# Patient Record
Sex: FEMALE | Race: WHITE | NOT HISPANIC OR LATINO | Employment: OTHER | URBAN - METROPOLITAN AREA
[De-identification: names, ages, dates, MRNs, and addresses within clinical notes are randomized per-mention and may not be internally consistent; named-entity substitution may affect disease eponyms.]

---

## 2017-01-13 ENCOUNTER — TELEPHONE (OUTPATIENT)
Dept: FAMILY MEDICINE CLINIC | Facility: CLINIC | Age: 62
End: 2017-01-13

## 2017-01-13 ENCOUNTER — OFFICE VISIT (OUTPATIENT)
Dept: FAMILY MEDICINE CLINIC | Facility: CLINIC | Age: 62
End: 2017-01-13

## 2017-01-13 VITALS
BODY MASS INDEX: 44.65 KG/M2 | DIASTOLIC BLOOD PRESSURE: 90 MMHG | WEIGHT: 227.4 LBS | RESPIRATION RATE: 16 BRPM | TEMPERATURE: 98.5 F | HEART RATE: 76 BPM | SYSTOLIC BLOOD PRESSURE: 118 MMHG | HEIGHT: 60 IN

## 2017-01-13 DIAGNOSIS — M25.562 CHRONIC PAIN OF LEFT KNEE: ICD-10-CM

## 2017-01-13 DIAGNOSIS — G89.29 CHRONIC PAIN OF LEFT KNEE: ICD-10-CM

## 2017-01-13 DIAGNOSIS — R05.9 COUGH: ICD-10-CM

## 2017-01-13 DIAGNOSIS — G47.00 INSOMNIA, UNSPECIFIED TYPE: ICD-10-CM

## 2017-01-13 DIAGNOSIS — J10.1 INFLUENZA A: Primary | ICD-10-CM

## 2017-01-13 DIAGNOSIS — R50.9 FEVER, UNSPECIFIED FEVER CAUSE: ICD-10-CM

## 2017-01-13 LAB
EXPIRATION DATE: ABNORMAL
FLUAV AG NPH QL: POSITIVE
FLUBV AG NPH QL: NEGATIVE
INTERNAL CONTROL: ABNORMAL
Lab: ABNORMAL

## 2017-01-13 PROCEDURE — 87804 INFLUENZA ASSAY W/OPTIC: CPT | Performed by: FAMILY MEDICINE

## 2017-01-13 PROCEDURE — 99213 OFFICE O/P EST LOW 20 MIN: CPT | Performed by: FAMILY MEDICINE

## 2017-01-13 RX ORDER — OSELTAMIVIR PHOSPHATE 75 MG/1
75 CAPSULE ORAL 2 TIMES DAILY
Qty: 10 CAPSULE | Refills: 0 | Status: SHIPPED | OUTPATIENT
Start: 2017-01-13 | End: 2017-01-30

## 2017-01-13 RX ORDER — ESZOPICLONE 1 MG/1
1 TABLET, FILM COATED ORAL NIGHTLY
Qty: 30 TABLET | Refills: 2 | Status: SHIPPED | OUTPATIENT
Start: 2017-01-13 | End: 2017-01-30

## 2017-01-13 RX ORDER — ESZOPICLONE 1 MG/1
1 TABLET, FILM COATED ORAL NIGHTLY
Qty: 30 TABLET | Refills: 0 | Status: SHIPPED | OUTPATIENT
Start: 2017-01-13 | End: 2017-01-13 | Stop reason: SDUPTHER

## 2017-01-13 RX ORDER — OSELTAMIVIR PHOSPHATE 75 MG/1
75 CAPSULE ORAL 2 TIMES DAILY
Qty: 10 CAPSULE | Refills: 0 | Status: SHIPPED | OUTPATIENT
Start: 2017-01-13 | End: 2017-01-13 | Stop reason: SDUPTHER

## 2017-01-13 NOTE — PATIENT INSTRUCTIONS
Go to the nearest ER or return to clinic if symptoms worsen, fever/chill develop      Influenza, Adult  Influenza (flu) is an infection in the mouth, nose, and throat (respiratory tract) caused by a virus. The flu can make you feel very ill. Influenza spreads easily from person to person (contagious).   HOME CARE   · Only take medicines as told by your doctor.  · Use a cool mist humidifier to make breathing easier.  · Get plenty of rest until your fever goes away. This usually takes 3 to 4 days.  · Drink enough fluids to keep your pee (urine) clear or pale yellow.  · Cover your mouth and nose when you cough or sneeze.  · Wash your hands well to avoid spreading the flu.  · Stay home from work or school until your fever has been gone for at least 1 full day.  · Get a flu shot every year.  GET HELP RIGHT AWAY IF:   · You have trouble breathing or feel short of breath.  · Your skin or nails turn blue.  · You have severe neck pain or stiffness.  · You have a severe headache, facial pain, or earache.  · Your fever gets worse or keeps coming back.  · You feel sick to your stomach (nauseous), throw up (vomit), or have watery poop (diarrhea).  · You have chest pain.  · You have a deep cough that gets worse, or you cough up more thick spit (mucus).  MAKE SURE YOU:   · Understand these instructions.  · Will watch your condition.  · Will get help right away if you are not doing well or get worse.     This information is not intended to replace advice given to you by your health care provider. Make sure you discuss any questions you have with your health care provider.     Document Released: 09/26/2009 Document Revised: 01/08/2016 Document Reviewed: 03/18/2013  [employer]om[employer] Interactive Patient Education ©2016 [employer]

## 2017-01-13 NOTE — MR AVS SNAPSHOT
Connie Lu   1/13/2017 9:15 AM   Office Visit    Dept Phone:  [phone]   Encounter #:  [id number]    Provider:  Ambreen Wright DO   Department:  [facility]                Your Full Care Plan              Today's Medication Changes          These changes are accurate as of: 1/13/17 10:09 AM.  If you have any questions, ask your nurse or doctor.               New Medication(s)Ordered:     eszopiclone 1 MG tablet   Commonly known as:  LUNESTA   Take 1 tablet by mouth Every Night. Take immediately before bedtime   Started by:  Ambreen Wright DO       HYDROcodone-homatropine 5-1.5 MG/5ML syrup   Commonly known as:  HYCODAN   Take 5 mL by mouth Every 6 (Six) Hours As Needed for cough.   Started by:  Ambreen Wright DO       oseltamivir 75 MG capsule   Commonly known as:  TAMIFLU   Take 1 capsule by mouth 2 (Two) Times a Day.   Started by:  Ambreen Wright DO         Stop taking medication(s)listed here:     temazepam 30 MG capsule   Commonly known as:  RESTORIL   Stopped by:  Ambreen Wright DO                Where to Get Your Medications      These medications were sent to [facility]/pharmacy #[address] [place] [address] - [address] [place] [address] AT [address] [phone] [facility] [phone] [place] [address]    Hours:  24-hours Phone:  [phone]     oseltamivir 75 MG capsule         You can get these medications from any pharmacy     Bring a paper prescription for each of these medications     eszopiclone 1 MG tablet    HYDROcodone-homatropine 5-1.5 MG/5ML syrup                  Your Updated Medication List          This list is accurate as of: 1/13/17 10:09 AM.  Always use your most recent med list.                * DULoxetine 30 MG capsule   Commonly known as:  CYMBALTA   TAKE 1 CAP QD WITH 60MG CAP QD FOR A TOTAL OF 90 MG QD       * DULoxetine 60 MG capsule   Commonly known as:  CYMBALTA   Take 1  capsule by mouth Daily.       eszopiclone 1 MG tablet   Commonly known as:  LUNESTA   Take 1 tablet by mouth Every Night. Take immediately before bedtime       HYDROcodone-homatropine 5-1.5 MG/5ML syrup   Commonly known as:  HYCODAN   Take 5 mL by mouth Every 6 (Six) Hours As Needed for cough.       levothyroxine 137 MCG tablet   Commonly known as:  SYNTHROID, LEVOTHROID   TAKE 1 TABLET EVERY DAY       losartan 100 MG tablet   Commonly known as:  COZAAR   TAKE 1 TABLET EVERY DAY       oseltamivir 75 MG capsule   Commonly known as:  TAMIFLU   Take 1 capsule by mouth 2 (Two) Times a Day.       triamterene-hydrochlorothiazide 37.5-25 MG per capsule   Commonly known as:  DYAZIDE   TAKE 1 CAPSULE EVERY DAY       * Notice:  This list has 2 medication(s) that are the same as other medications prescribed for you. Read the directions carefully, and ask your doctor or other care provider to review them with you.            We Performed the Following     Ambulatory Referral to Orthopedic Surgery     POC Influenza A / B       You Were Diagnosed With        Codes Comments    Cough    -  Primary ICD-10-CM: R05  ICD-9-CM: 786.2     Fever, unspecified fever cause     ICD-10-CM: R50.9  ICD-9-CM: 780.60     Chronic pain of left knee     ICD-10-CM: M25.562, G89.29  ICD-9-CM: 719.46, 338.29     Influenza A     ICD-10-CM: J10.1  ICD-9-CM: 487.1     Insomnia, unspecified type     ICD-10-CM: G47.00  ICD-9-CM: 780.52       Instructions    Go to the nearest ER or return to clinic if symptoms worsen, fever/chill develop      Influenza, Adult  Influenza (flu) is an infection in the mouth, nose, and throat (respiratory tract) caused by a virus. The flu can make you feel very ill. Influenza spreads easily from person to person (contagious).   HOME CARE   · Only take medicines as told by your doctor.  · Use a cool mist humidifier to make breathing easier.  · Get plenty of rest until your fever goes away. This usually takes 3 to 4 days.  · Drink  enough fluids to keep your pee (urine) clear or pale yellow.  · Cover your mouth and nose when you cough or sneeze.  · Wash your hands well to avoid spreading the flu.  · Stay home from work or school until your fever has been gone for at least 1 full day.  · Get a flu shot every year.  GET HELP RIGHT AWAY IF:   · You have trouble breathing or feel short of breath.  · Your skin or nails turn blue.  · You have severe neck pain or stiffness.  · You have a severe headache, facial pain, or earache.  · Your fever gets worse or keeps coming back.  · You feel sick to your stomach (nauseous), throw up (vomit), or have watery poop (diarrhea).  · You have chest pain.  · You have a deep cough that gets worse, or you cough up more thick spit (mucus).  MAKE SURE YOU:   · Understand these instructions.  · Will watch your condition.  · Will get help right away if you are not doing well or get worse.     This information is not intended to replace advice given to you by your health care provider. Make sure you discuss any questions you have with your health care provider.     Document Released: 2009 Document Revised: 2016 Document Reviewed: 2013  [employer] Interactive Patient Education ©6 [employer]       Patient Instructions History      Upcoming Appointments     Visit Type Date Time Department    OFFICE VISIT 2017  9:15 AM [facility]    FOLLOW UP 3/3/2017  4:00 PM [facility] Signup     [facility] [employer] allows you to send messages to your doctor, view your test results, renew your prescriptions, schedule appointments, and more. To sign up, go to [employer] and click on the Sign Up Now link in the New User? box. Enter your ProtoExchange Activation Code exactly as it appears below along with the last four digits of your Social Security Number and your Date of Birth () to complete the sign-up process. If you do not sign up before the expiration date, you  "must request a new code.    MyChart Activation Code: [email or web address]  Expires: 1/27/2017 10:09 AM    If you have questions, you can email [email or web address] or call [phone] to talk to our MyChart staff. Remember, MyChart is NOT to be used for urgent needs. For medical emergencies, dial 911.               Other Info from Your Visit           Your Appointments     Mar 03, 2017  4:00 PM EST   Follow Up with Ambreen Wright,    [facility]ICINE (--)    [address] [place]. [facility] [address] [phone]           Arrive 15 minutes prior to appointment. Arrive 15 minutes before your appointment to complete Registration. Please bring all medications, insurance card and copay.              Allergies     Cephalexin        Reason for Visit     chest congestion & cough, fever started two days ago    Knee pain / L side needs referral back to Dr. Fernández's office    FU insomnia Temazepam not working      Vital Signs     Blood Pressure Pulse Temperature Respirations Height Weight    118/90 76 98.5 °F (36.9 °C) 16 60\" (152.4 cm) 227 lb 6.4 oz (103 kg)    Body Mass Index Smoking Status                44.41 kg/m2 Never Smoker          Problems and Diagnoses Noted     Knee pain    Cough    -  Primary    Fever        Influenza A        Difficulty falling or staying asleep          Results     POC Influenza A / B      Component Value Standard Range & Units    Rapid Influenza A Ag positive     Rapid Influenza B Ag negative     Internal Control Passed Passed    Lot Number [id number]     Expiration Date 12/2017                     "

## 2017-01-13 NOTE — PROGRESS NOTES
"Subjective   Connie Lu is a 61 y.o. female.     History of Present Illness   Here for evaluation of fever and cough  Symptoms started Wednesday night  She has been coughing, very little sputum production  Pleuritic chest pain worse with coughing  Fever yesterday was 102  +headache  Denies body aches, nausea, vomiting    Chronic left knee pain  She has been seeing a chiropractor for treatment. Recently did xrays on her back, was told that she has arthritic changes  She has been told that her knee is \"bone on bone\"  Was supposed to get injections in her knee, but insurance wouldn't approve them  She is wanting to be referred back to Dr. Fernández for evaluation and possible injection therapy     Insomnia:  She has previously used Ambien for 3 years, it eventually lost its effects  Currently taking Restoril 30mg nightly. Having difficulty falling asleep, up and down all night. Not getting more than 4 hours total sleep at night.  Restoril never did improve ability to fall asleep or quality of sleep.  She has tried OTC melatonin and Unisom without any improvement of sleep.    The following portions of the patient's history were reviewed and updated as appropriate: allergies, current medications, past family history, past medical history, past social history, past surgical history and problem list.    Review of Systems   Constitutional: Positive for chills, fatigue and fever.   HENT: Positive for congestion and ear pain. Negative for postnasal drip, rhinorrhea, sinus pressure and sore throat.    Respiratory: Positive for cough and chest tightness.    Cardiovascular: Negative for chest pain.   Gastrointestinal: Positive for abdominal pain. Negative for diarrhea, nausea and vomiting.   Musculoskeletal: Positive for arthralgias (knees). Negative for myalgias.   Neurological: Positive for headaches. Negative for dizziness.   Psychiatric/Behavioral: Positive for sleep disturbance. Negative for agitation, self-injury and " suicidal ideas.       Objective   Physical Exam   Constitutional: She is oriented to person, place, and time. She appears well-developed and well-nourished.   HENT:   Head: Normocephalic and atraumatic.   Right Ear: Hearing, tympanic membrane and external ear normal. No middle ear effusion.   Left Ear: Hearing, tympanic membrane and external ear normal.  No middle ear effusion.   Nose: Nose normal.   Ear canals erythematous bilaterally   Eyes: Conjunctivae and EOM are normal.   Neck: Normal range of motion. Neck supple.   Cardiovascular: Normal rate, regular rhythm and normal heart sounds.    No murmur heard.  Pulmonary/Chest: Effort normal and breath sounds normal. She has no wheezes.   Coughs throughout the exam   Abdominal: Soft. Bowel sounds are normal.   Musculoskeletal: She exhibits no edema.   Lymphadenopathy:     She has no cervical adenopathy.   Neurological: She is alert and oriented to person, place, and time. No cranial nerve deficit.   Skin: Skin is warm and dry. No rash noted.   Psychiatric: She has a normal mood and affect. Her behavior is normal. Thought content normal.   Nursing note and vitals reviewed.      Assessment/Plan   Connie was seen today for chest congestion & cough, fever, knee pain / l side and fu insomnia.    Diagnoses and all orders for this visit:    Influenza A  -     Discontinue: oseltamivir (TAMIFLU) 75 MG capsule; Take 1 capsule by mouth 2 (Two) Times a Day.  -     Discontinue: HYDROcodone-homatropine (HYCODAN) 5-1.5 MG/5ML syrup; Take 5 mL by mouth Every 6 (Six) Hours As Needed for cough.  -     oseltamivir (TAMIFLU) 75 MG capsule; Take 1 capsule by mouth 2 (Two) Times a Day.  -     HYDROcodone-homatropine (HYCODAN) 5-1.5 MG/5ML syrup; Take 5 mL by mouth Every 6 (Six) Hours As Needed for cough.    Cough  -     POC Influenza A / B  -     Discontinue: HYDROcodone-homatropine (HYCODAN) 5-1.5 MG/5ML syrup; Take 5 mL by mouth Every 6 (Six) Hours As Needed for cough.  -      HYDROcodone-homatropine (HYCODAN) 5-1.5 MG/5ML syrup; Take 5 mL by mouth Every 6 (Six) Hours As Needed for cough.    Fever, unspecified fever cause  -     POC Influenza A / B    Chronic pain of left knee  -     Ambulatory Referral to Orthopedic Surgery    Insomnia, unspecified type  -     Discontinue: eszopiclone (LUNESTA) 1 MG tablet; Take 1 tablet by mouth Every Night. Take immediately before bedtime  -     eszopiclone (LUNESTA) 1 MG tablet; Take 1 tablet by mouth Every Night. Take immediately before bedtime    She did test positive for influenza a, will prescribe Tamiflu for treatment.  Recommended her to alternate Tylenol and Motrin as needed for fever and body aches.   If she cannot afford the Tamiflu prescription, she can take over-the-counter Tylenol Cold and flu with TheraFlu as directed for treatment.  Cough syrup prescribed to be used when necessary  Go to the nearest ER or return to clinic if symptoms worsen, fever/chill develop  Discontinue Restoril since it was not improving her insomnia. She has failed Ambien, also.  I will attempt treatment with Lunesta.  Advised her to follow good sleep hygiene including no TVs, telephone, or electronics on while trying to fall asleep.   Referred back to orthopedic surgery per her request    Ambreen Wright DO

## 2017-01-16 ENCOUNTER — TELEPHONE (OUTPATIENT)
Dept: FAMILY MEDICINE CLINIC | Facility: CLINIC | Age: 62
End: 2017-01-16

## 2017-01-16 NOTE — TELEPHONE ENCOUNTER
----- Message from Kiera Munoz sent at 1/16/2017  8:35 AM EST -----  Contact: Adriana  Patient stated she hasn't had a fever since yesterday and wanted to see if it was okay for her to go back to work tomorrow. Please call at [phone].

## 2017-01-30 ENCOUNTER — OFFICE VISIT (OUTPATIENT)
Dept: FAMILY MEDICINE CLINIC | Facility: CLINIC | Age: 62
End: 2017-01-30

## 2017-01-30 VITALS
HEART RATE: 84 BPM | RESPIRATION RATE: 16 BRPM | DIASTOLIC BLOOD PRESSURE: 90 MMHG | TEMPERATURE: 97.2 F | BODY MASS INDEX: 44.37 KG/M2 | HEIGHT: 60 IN | OXYGEN SATURATION: 99 % | WEIGHT: 226 LBS | SYSTOLIC BLOOD PRESSURE: 120 MMHG

## 2017-01-30 DIAGNOSIS — F34.1 DYSTHYMIA: ICD-10-CM

## 2017-01-30 DIAGNOSIS — R05.9 COUGH: Primary | ICD-10-CM

## 2017-01-30 DIAGNOSIS — E03.9 ACQUIRED HYPOTHYROIDISM: ICD-10-CM

## 2017-01-30 DIAGNOSIS — I10 ESSENTIAL HYPERTENSION: ICD-10-CM

## 2017-01-30 DIAGNOSIS — F41.9 ANXIETY: ICD-10-CM

## 2017-01-30 DIAGNOSIS — F51.01 PRIMARY INSOMNIA: ICD-10-CM

## 2017-01-30 PROCEDURE — 99214 OFFICE O/P EST MOD 30 MIN: CPT | Performed by: FAMILY MEDICINE

## 2017-01-30 RX ORDER — TRIAMTERENE AND HYDROCHLOROTHIAZIDE 37.5; 25 MG/1; MG/1
1 CAPSULE ORAL DAILY
Qty: 30 CAPSULE | Refills: 2 | Status: SHIPPED | OUTPATIENT
Start: 2017-01-30 | End: 2017-06-05 | Stop reason: SDUPTHER

## 2017-01-30 RX ORDER — TRAZODONE HYDROCHLORIDE 50 MG/1
50-100 TABLET ORAL NIGHTLY
Qty: 30 TABLET | Refills: 2 | Status: SHIPPED | OUTPATIENT
Start: 2017-01-30 | End: 2017-04-12 | Stop reason: SDUPTHER

## 2017-01-30 RX ORDER — LEVOTHYROXINE SODIUM 137 UG/1
137 TABLET ORAL DAILY
Qty: 30 TABLET | Refills: 2 | Status: SHIPPED | OUTPATIENT
Start: 2017-01-30 | End: 2017-03-12 | Stop reason: SDUPTHER

## 2017-01-30 RX ORDER — LOSARTAN POTASSIUM 100 MG/1
100 TABLET ORAL DAILY
Qty: 30 TABLET | Refills: 2 | Status: SHIPPED | OUTPATIENT
Start: 2017-01-30 | End: 2017-05-10 | Stop reason: SDUPTHER

## 2017-01-30 RX ORDER — DULOXETIN HYDROCHLORIDE 30 MG/1
CAPSULE, DELAYED RELEASE ORAL
Qty: 30 CAPSULE | Refills: 2 | Status: SHIPPED | OUTPATIENT
Start: 2017-01-30 | End: 2017-05-10 | Stop reason: SDUPTHER

## 2017-01-30 RX ORDER — DULOXETIN HYDROCHLORIDE 60 MG/1
60 CAPSULE, DELAYED RELEASE ORAL DAILY
Qty: 30 CAPSULE | Refills: 2 | Status: SHIPPED | OUTPATIENT
Start: 2017-01-30 | End: 2017-06-05 | Stop reason: SDUPTHER

## 2017-01-30 NOTE — MR AVS SNAPSHOT
Connie Lu   1/30/2017 4:00 PM   Office Visit    Dept Phone:  [phone]   Encounter #:  [id number]    Provider:  Masoud Martin MD   Department:  [facility]                Your Full Care Plan              Today's Medication Changes          These changes are accurate as of: 1/30/17  4:33 PM.  If you have any questions, ask your nurse or doctor.               New Medication(s)Ordered:     traZODone 50 MG tablet   Commonly known as:  DESYREL   Take 1-2 tablets by mouth Every Night.   Started by:  Masoud Martin MD         Medication(s)that have changed:     levothyroxine 137 MCG tablet   Commonly known as:  SYNTHROID, LEVOTHROID   Take 1 tablet by mouth Daily.   What changed:  See the new instructions.   Changed by:  Masoud Martin MD       losartan 100 MG tablet   Commonly known as:  COZAAR   Take 1 tablet by mouth Daily.   What changed:  See the new instructions.   Changed by:  Masoud Martin MD       triamterene-hydrochlorothiazide 37.5-25 MG per capsule   Commonly known as:  DYAZIDE   Take 1 capsule by mouth Daily.   What changed:  See the new instructions.   Changed by:  Masoud Martin MD         Stop taking medication(s)listed here:     eszopiclone 1 MG tablet   Commonly known as:  LUNESTA   Stopped by:  Masoud Martin MD           oseltamivir 75 MG capsule   Commonly known as:  TAMIFLU   Stopped by:  Masoud Martin MD                Where to Get Your Medications      These medications were sent to [facility]/pharmacy [address] [place] [address] - [address] [place] [address] AT [address] [phone] [facility] [phone] [place] [address]    Hours:  24-hours Phone:  [phone]     DULoxetine 30 MG capsule    DULoxetine 60 MG capsule    levothyroxine 137 MCG tablet    losartan 100 MG tablet    traZODone 50 MG tablet    triamterene-hydrochlorothiazide 37.5-25 MG per capsule         You can get these medications from any  pharmacy     Bring a paper prescription for each of these medications     HYDROcodone-homatropine 5-1.5 MG/5ML syrup                  Your Updated Medication List          This list is accurate as of: 1/30/17  4:33 PM.  Always use your most recent med list.                * DULoxetine 30 MG capsule   Commonly known as:  CYMBALTA   TAKE 1 CAP QD WITH 60MG CAP QD FOR A TOTAL OF 90 MG QD       * DULoxetine 60 MG capsule   Commonly known as:  CYMBALTA   Take 1 capsule by mouth Daily.       HYDROcodone-homatropine 5-1.5 MG/5ML syrup   Commonly known as:  HYCODAN   Take 5 mL by mouth Every 6 (Six) Hours As Needed for cough.       levothyroxine 137 MCG tablet   Commonly known as:  SYNTHROID, LEVOTHROID   Take 1 tablet by mouth Daily.       losartan 100 MG tablet   Commonly known as:  COZAAR   Take 1 tablet by mouth Daily.       traZODone 50 MG tablet   Commonly known as:  DESYREL   Take 1-2 tablets by mouth Every Night.       triamterene-hydrochlorothiazide 37.5-25 MG per capsule   Commonly known as:  DYAZIDE   Take 1 capsule by mouth Daily.       * Notice:  This list has 2 medication(s) that are the same as other medications prescribed for you. Read the directions carefully, and ask your doctor or other care provider to review them with you.            You Were Diagnosed With        Codes Comments    Cough    -  Primary ICD-10-CM: R05  ICD-9-CM: 786.2     Primary insomnia     ICD-10-CM: F51.01  ICD-9-CM: 307.42     Dysthymia     ICD-10-CM: F34.1  ICD-9-CM: 300.4     Anxiety     ICD-10-CM: F41.9  ICD-9-CM: 300.00     Essential hypertension     ICD-10-CM: I10  ICD-9-CM: 401.9     Acquired hypothyroidism     ICD-10-CM: E03.9  ICD-9-CM: 244.9       Instructions     None    Patient Instructions History      Upcoming Appointments     Visit Type Date Time Department    OFFICE VISIT 1/30/2017  4:00 PM E [facility]    FOLLOW UP 3/3/2017  4:00 PM E [facility] Signup     [facility] allows you  "to send messages to your doctor, view your test results, renew your prescriptions, schedule appointments, and more. To sign up, go to [employer] and click on the Sign Up Now link in the New User? box. Enter your Streamfile Activation Code exactly as it appears below along with the last four digits of your Social Security Number and your Date of Birth () to complete the sign-up process. If you do not sign up before the expiration date, you must request a new code.    Streamfile Activation Code: [email or web address]  Expires: 2017  4:33 PM    If you have questions, you can email [email or web address] or call [phone] to talk to our Stream[employer] staff. Remember, Stream[employer] is NOT to be used for urgent needs. For medical emergencies, dial 911.               Other Info from Your Visit           Your Appointments     Mar 03, 2017  4:00 PM EST   Follow Up with Ambreen Wright DO   [facility]ICINE (--)    [address] [facility] [address] [phone]           Arrive 15 minutes prior to appointment. Arrive 15 minutes before your appointment to complete Registration. Please bring all medications, insurance card and copay.              Allergies     Cephalexin        Reason for Visit     Cough pt is hoarse and isn't able to sleep, rx Dr Wright prescribed was 300.00 and pt wasn't able to get filled    Shortness of Breath beleives her asthma is acting up    Insomnia           Vital Signs     Blood Pressure Pulse Temperature Respirations Height Weight    120/90 84 97.2 °F (36.2 °C) (Temporal Artery ) 16 60\" (152.4 cm) 226 lb (103 kg)    Oxygen Saturation Body Mass Index Smoking Status             99% 44.14 kg/m2 Never Smoker         Problems and Diagnoses Noted     Anxiety problem    Depression    High blood pressure    Underactive thyroid    Difficulty falling or staying asleep    Cough    -  Primary        "

## 2017-01-30 NOTE — PROGRESS NOTES
Chief Complaint   Patient presents with   • Cough     pt is hoarse and isn't able to sleep, rx Dr Wright prescribed was 300.00 and pt wasn't able to get filled   • Shortness of Breath     beleives her asthma is acting up   • Insomnia       Subjective     Cough   This is a new problem. The current episode started 1 to 4 weeks ago ([place] 1/12/2017, dx + Flu A, was too expensive and did not take). The problem occurs hourly. The cough is productive of sputum (clear). Associated symptoms include shortness of breath (? asthma acting up, Dr Guerin previous dx. Has inhaler and uses as needed. Used inhaler and no help) and wheezing. Pertinent negatives include no chest pain, fever (no fever x 1 week) or nasal congestion. She has tried a beta-agonist inhaler (hycodan helped while taking and slept better) for the symptoms. The treatment provided no relief.       Insomnia  Chronic sleep issues  Tried Temazepam 15 mg and 30 and no help  Ambien in the past and tried again and no help. Ambien helped in the past.     Hard to work full 8 hour days and has increased neck and back pain. Goes to chiropractor and helps. Has arthritis in the neck and back. Considering apply to disability and the appeal. Has decreased to 20 hrs per work per week.     working on losing weight, friend is helping her.     Depression and anxiety  Tried Cymbalta 60 am and 30 mg HS . When taking both, increased agitation.   Not depression on meds.   No suicidal thoughts    Hypothyroidism  Needs refill of thyroid medicine.  Last levels have been good.  No new symptoms.  No significant fatigue except for lack of sleep    Hypertension.  Needs refill of medication.  No chest pain.  Has had shortness of breath related cough as noted.  No swelling.  Medications have been helping with blood pressure  Past Medical History,Medications, Allergies, and social history was reviewed.    Review of Systems   Constitutional: Positive for fatigue. Negative for fever (no fever x  1 week).   HENT: Positive for congestion.    Respiratory: Positive for cough, shortness of breath (? asthma acting up, Dr Guerin previous dx. Has inhaler and uses as needed. Used inhaler and no help) and wheezing.    Cardiovascular: Negative for chest pain.   Gastrointestinal: Negative.    Genitourinary: Negative.    Neurological: Negative.    Psychiatric/Behavioral: Positive for sleep disturbance. The patient is nervous/anxious (some anciety. On Cymbalta and helps).        Objective     Physical Exam   Constitutional: She is oriented to person, place, and time. She appears well-developed and well-nourished.   obese   HENT:   Head: Normocephalic and atraumatic.   Right Ear: Hearing, tympanic membrane, external ear and ear canal normal.   Left Ear: Hearing, tympanic membrane, external ear and ear canal normal.   Mouth/Throat: Oropharynx is clear and moist.   Eyes: Conjunctivae and EOM are normal. Pupils are equal, round, and reactive to light.   Neck: Normal range of motion. Neck supple. No thyromegaly present.   Cardiovascular: Normal rate, regular rhythm and normal heart sounds.  Exam reveals no gallop and no friction rub.    No murmur heard.  Pulmonary/Chest: Effort normal and breath sounds normal. No respiratory distress. She has no wheezes. She has no rales.   Abdominal: Soft. Bowel sounds are normal.   Musculoskeletal: She exhibits no edema.   Neurological: She is alert and oriented to person, place, and time.   Skin: Skin is warm and dry.   Psychiatric: She has a normal mood and affect. Her behavior is normal.   Nursing note and vitals reviewed.        Assessment/Plan     Problem List Items Addressed This Visit        Cardiovascular and Mediastinum    Hypertension    Relevant Medications    losartan (COZAAR) 100 MG tablet    triamterene-hydrochlorothiazide (DYAZIDE) 37.5-25 MG per capsule       Endocrine    Hypothyroidism    Relevant Medications    levothyroxine (SYNTHROID, LEVOTHROID) 137 MCG tablet        Other    Depression    Relevant Medications    traZODone (DESYREL) 50 MG tablet    DULoxetine (CYMBALTA) 30 MG capsule    DULoxetine (CYMBALTA) 60 MG capsule    Primary insomnia    Relevant Medications    traZODone (DESYREL) 50 MG tablet    Anxiety    Relevant Medications    DULoxetine (CYMBALTA) 30 MG capsule    DULoxetine (CYMBALTA) 60 MG capsule      Other Visit Diagnoses     Cough    -  Primary    Relevant Medications    HYDROcodone-homatropine (HYCODAN) 5-1.5 MG/5ML syrup          Mike dated on 1/13/2017  was reviewed and appropriate.      DISCUSSION  Recent influenza with persistent cough.  Refill cough medication.  No evidence of secondary infection.  She will call if develops any further fever, discolored drainage.  Or if does not improve over the next 1-2 weeks or sooner if worsening.    Insomnia.  We will try trazodone as noted.    Depression.  Stable.  Continue Cymbalta.    Hypertension.  Stable.  Refilled medication.    Hyperthyroidism.  Refilled thyroid medication.    Return if symptoms worsen or fail to improve.     MEDICATIONS PRESCRIBED  Requested Prescriptions     Signed Prescriptions Disp Refills   • HYDROcodone-homatropine (HYCODAN) 5-1.5 MG/5ML syrup 180 mL 0     Sig: Take 5 mL by mouth Every 6 (Six) Hours As Needed for cough.   • traZODone (DESYREL) 50 MG tablet 30 tablet 2     Sig: Take 1-2 tablets by mouth Every Night.   • DULoxetine (CYMBALTA) 30 MG capsule 30 capsule 2     Sig: TAKE 1 CAP QD WITH 60MG CAP QD FOR A TOTAL OF 90 MG QD   • DULoxetine (CYMBALTA) 60 MG capsule 30 capsule 2     Sig: Take 1 capsule by mouth Daily.   • levothyroxine (SYNTHROID, LEVOTHROID) 137 MCG tablet 30 tablet 2     Sig: Take 1 tablet by mouth Daily.   • losartan (COZAAR) 100 MG tablet 30 tablet 2     Sig: Take 1 tablet by mouth Daily.   • triamterene-hydrochlorothiazide (DYAZIDE) 37.5-25 MG per capsule 30 capsule 2     Sig: Take 1 capsule by mouth Daily.          Masoud Martin MD

## 2017-02-07 ENCOUNTER — OFFICE VISIT (OUTPATIENT)
Dept: RETAIL CLINIC | Facility: CLINIC | Age: 62
End: 2017-02-07

## 2017-02-07 VITALS
OXYGEN SATURATION: 91 % | RESPIRATION RATE: 18 BRPM | SYSTOLIC BLOOD PRESSURE: 112 MMHG | TEMPERATURE: 98.7 F | HEART RATE: 83 BPM | DIASTOLIC BLOOD PRESSURE: 68 MMHG

## 2017-02-07 DIAGNOSIS — J40 BRONCHITIS: Primary | ICD-10-CM

## 2017-02-07 PROCEDURE — 96372 THER/PROPH/DIAG INJ SC/IM: CPT | Performed by: NURSE PRACTITIONER

## 2017-02-07 PROCEDURE — 99213 OFFICE O/P EST LOW 20 MIN: CPT | Performed by: NURSE PRACTITIONER

## 2017-02-07 RX ORDER — AZITHROMYCIN 250 MG/1
TABLET, FILM COATED ORAL
Qty: 6 TABLET | Refills: 0 | Status: SHIPPED | OUTPATIENT
Start: 2017-02-07 | End: 2017-03-03

## 2017-02-07 RX ORDER — ALBUTEROL SULFATE 90 UG/1
2 AEROSOL, METERED RESPIRATORY (INHALATION) EVERY 4 HOURS PRN
Qty: 1 INHALER | Refills: 0 | Status: SHIPPED | OUTPATIENT
Start: 2017-02-07 | End: 2017-02-14

## 2017-02-07 RX ORDER — ALBUTEROL SULFATE 2.5 MG/3ML
2.5 SOLUTION RESPIRATORY (INHALATION) ONCE
Status: COMPLETED | OUTPATIENT
Start: 2017-02-07 | End: 2017-02-07

## 2017-02-07 RX ORDER — PREDNISONE 10 MG/1
TABLET ORAL DAILY
Qty: 21 EACH | Refills: 0 | Status: SHIPPED | OUTPATIENT
Start: 2017-02-07 | End: 2017-02-13

## 2017-02-07 RX ADMIN — ALBUTEROL SULFATE 2.5 MG: 2.5 SOLUTION RESPIRATORY (INHALATION) at 18:02

## 2017-02-07 NOTE — PROGRESS NOTES
"Subjective   Connie Lu is a 61 y.o. female that presents with c/o right ear pain, cough, \"exhaustion\", fever on 2/5. Was diagnosed with influenza A on 1/16/17-did not take Tamiflu; only missed 2 days of work. Has been taking Tylenol and Hycodan with mild relief.     Cough   This is a new problem. The current episode started in the past 7 days. The problem has been gradually worsening. The cough is productive of sputum. Associated symptoms include chills, ear congestion, ear pain, a fever, headaches, myalgias, nasal congestion, a sore throat, shortness of breath and wheezing. Pertinent negatives include no eye redness, postnasal drip, rash or rhinorrhea. The symptoms are aggravated by lying down. She has tried prescription cough suppressant for the symptoms. The treatment provided mild relief.   Earache    There is pain in the right ear. This is a new problem. The current episode started in the past 7 days. The problem occurs every few minutes. The problem has been gradually worsening. The maximum temperature recorded prior to her arrival was 100.4 - 100.9 F. The fever has been present for less than 1 day. The pain is at a severity of 6/10. The pain is moderate. Associated symptoms include coughing, headaches and a sore throat. Pertinent negatives include no abdominal pain, diarrhea, ear discharge, hearing loss, rash, rhinorrhea or vomiting. She has tried acetaminophen for the symptoms. The treatment provided mild relief.        The following portions of the patient's history were reviewed and updated as appropriate: allergies, current medications, past family history, past medical history, past social history, past surgical history and problem list.    Review of Systems   Constitutional: Positive for chills, fatigue and fever.   HENT: Positive for congestion, ear pain and sore throat. Negative for ear discharge, hearing loss, postnasal drip, rhinorrhea, sinus pressure, sneezing and trouble swallowing.  "   Eyes: Negative for redness.   Respiratory: Positive for cough, chest tightness, shortness of breath and wheezing.    Cardiovascular: Negative for palpitations.   Gastrointestinal: Negative for abdominal pain, diarrhea, nausea and vomiting.   Musculoskeletal: Positive for myalgias.   Skin: Negative for rash.   Neurological: Positive for headaches. Negative for dizziness.       Objective   Physical Exam   Constitutional: She is oriented to person, place, and time. She appears well-developed and well-nourished.   HENT:   Head: Normocephalic and atraumatic.   Right Ear: Hearing, external ear and ear canal normal. No drainage. Tympanic membrane is erythematous (slight). No middle ear effusion. No decreased hearing is noted.   Left Ear: Hearing, tympanic membrane, external ear and ear canal normal. No drainage. Tympanic membrane is not erythematous.  No middle ear effusion. No decreased hearing is noted.   Nose: Mucosal edema (moderate) present. No rhinorrhea. Right sinus exhibits no maxillary sinus tenderness and no frontal sinus tenderness. Left sinus exhibits no maxillary sinus tenderness and no frontal sinus tenderness.   Mouth/Throat: Uvula is midline. Mucous membranes are dry. No uvula swelling. Posterior oropharyngeal erythema (mild) present. Tonsils are 0 on the right. Tonsils are 0 on the left. No tonsillar exudate.   Eyes: Conjunctivae are normal. Pupils are equal, round, and reactive to light.   Neck: Normal range of motion.   Cardiovascular: Normal rate, regular rhythm and normal heart sounds.    No murmur heard.  Pulmonary/Chest: Effort normal. No respiratory distress. She has decreased breath sounds in the right upper field and the left upper field. She has no wheezes. She has rhonchi (clears some with coughing) in the right upper field and the left upper field. She has no rales. She exhibits tenderness (mild).   Albuterol neb tx administered at this time-after completing treatment breath sounds with  decreased rhonchi and improved air movement; patient verbalizes improvement. O2 sat-95% and HR-92.-EN   Lymphadenopathy:     She has cervical adenopathy (bilateral anterior).   Neurological: She is alert and oriented to person, place, and time.   Skin: Skin is warm and dry. No rash noted.   Psychiatric: She has a normal mood and affect. Her behavior is normal. Judgment and thought content normal.   Vitals reviewed.      Assessment/Plan   Connie was seen today for earache.    Diagnoses and all orders for this visit:    Bronchitis  -     azithromycin (ZITHROMAX Z-GARY) 250 MG tablet; Take 2 tablets the first day, then 1 tablet daily for 4 days.  -     PredniSONE (DELTASONE) 10 MG (21) tablet pack; Take  by mouth Daily for 6 days.  -     albuterol (PROVENTIL) nebulizer solution 0.083% 2.5 mg/3mL; Take 2.5 mg by nebulization 1 (One) Time.  -     Nebulizer Treatment  -     albuterol (PROVENTIL HFA;VENTOLIN HFA) 108 (90 BASE) MCG/ACT inhaler; Inhale 2 puffs Every 4 (Four) Hours As Needed for wheezing for up to 7 days.      Instructed patient to rest, increase fluids and side effects of medications. Instructed patient regarding proper use of ventolin inhaler. If symptoms persist or worsen follow-up with PCP for further evaluation. Patient verbalizes understanding.-EN

## 2017-03-03 ENCOUNTER — OFFICE VISIT (OUTPATIENT)
Dept: FAMILY MEDICINE CLINIC | Facility: CLINIC | Age: 62
End: 2017-03-03

## 2017-03-03 VITALS
TEMPERATURE: 97.3 F | WEIGHT: 223.8 LBS | DIASTOLIC BLOOD PRESSURE: 72 MMHG | BODY MASS INDEX: 43.71 KG/M2 | HEART RATE: 100 BPM | SYSTOLIC BLOOD PRESSURE: 102 MMHG | RESPIRATION RATE: 20 BRPM

## 2017-03-03 DIAGNOSIS — E03.9 ACQUIRED HYPOTHYROIDISM: ICD-10-CM

## 2017-03-03 DIAGNOSIS — E55.9 VITAMIN D DEFICIENCY: ICD-10-CM

## 2017-03-03 DIAGNOSIS — I10 ESSENTIAL HYPERTENSION: ICD-10-CM

## 2017-03-03 DIAGNOSIS — E78.5 HYPERLIPIDEMIA, UNSPECIFIED HYPERLIPIDEMIA TYPE: Primary | ICD-10-CM

## 2017-03-03 DIAGNOSIS — R73.9 HYPERGLYCEMIA: ICD-10-CM

## 2017-03-03 PROCEDURE — 99214 OFFICE O/P EST MOD 30 MIN: CPT | Performed by: FAMILY MEDICINE

## 2017-03-03 NOTE — PATIENT INSTRUCTIONS
Go to the nearest ER or return to clinic if symptoms worsen, fever/chill develop    Eye Floaters  Eye floaters are specks of material that float around inside your eye. A jelly-like fluid (vitreous) fills the inside of your eye. The vitreous is normally clear. It allows light to pass through to tissues at the back of the eye (retina). The retina contains the nerves needed for vision.   Your vitreous can start to shrink and become stringy as you age. Strands of material may start to float around inside the eye. They come from clumps of cells, blood, or other materials. These objects cast shadows on the retina and show up as floaters. Floaters may be more obvious when you look up at the jairo or at a bright, blank background. They do not go away completely. In time, however, they may settle below your line of sight. Floaters can be annoying. They do not usually cause vision problems.  Sometimes floaters appear along with flashes. Flashes look like bright, quick streaks of light. They usually occur at the edge of your vision. Flashes result when your vitreous pulls on your retina. They also occur with age. However, they could be a warning sign of a detached retina. This is a serious condition that requires emergency treatment to prevent vision loss.  CAUSES   For most people, eye floaters develop when the vitreous begins to shrink as a normal part of aging. More serious causes of floaters include:  · A torn retina.  · Injury.    · Bleeding inside the eye. Diabetes and other conditions can cause broken retinal blood vessels.  · A blood clot in the major vein of the retina or its branches (retinal vein occlusion).  · Retinal detachment.    · Vitreous detachment.    · Inflammation inside the eye (uveitis).    · Infection inside the eye.  RISK FACTORS  You may have a higher risk for floaters if:   · You are older.  · You are nearsighted.  · You have diabetes.  · You have had cataracts removed.  SIGNS AND SYMPTOMS   Symptoms  "of floaters include seeing small, shadowy shapes move across your vision. They move as your eyes move. They drift out of your vision when you keep your eyes still. These shapes may look like:  · Specks.  · Dots.  · Circles.  · Squiggly lines.  · Thread.  Symptoms of flashes include seeing:  · Bursts of light.  · Flashing lights.  · Lightning streaks.  · What is commonly referred to as \"stars.\"  DIAGNOSIS   Your health care provider may diagnose floaters and flashes based on your symptoms. You may need to see an eye care specialist (optometrist or ophthalmologist). The specialist will do an exam to determine whether your floaters are a normal part of aging or a warning sign of a more serious eye problem. The specialist may put drops in your eyes to open your pupils wide (dilate) and then use a special scope (slit lamp) to look inside your eye.   TREATMENT   No treatment is needed for floaters that occur normally with age. Sometimes floaters become severe enough to affect your vision. In rare cases, surgery to remove the vitreous and replace it with a saltwater solution (vitrectomy) may be considered.  HOME CARE INSTRUCTIONS  Keep all follow-up visits as directed by your health care provider. This is important.   SEEK MEDICAL CARE IF:   · You have a sudden increase in floaters.  · You have floaters along with flashes.  · You have floaters along with any new eye symptoms.  SEEK IMMEDIATE MEDICAL CARE IF:   · You have a sudden increase in floaters or flashes that interferes with your vision.  · Your vision suddenly changes.     This information is not intended to replace advice given to you by your health care provider. Make sure you discuss any questions you have with your health care provider.     Document Released: 12/20/2004 Document Revised: 01/08/2016 Document Reviewed: 08/12/2015  [employer] Interactive Patient Education ©2016 [employer]    "

## 2017-03-03 NOTE — PROGRESS NOTES
"Subjective   Connie Lu is a 61 y.o. female.     History of Present Illness   Here for evaluation of HTN, Hypothyroidism, insomnia    She has recently started seeing spots floating around  Watched a movie today and can see an \"arch\" in her left eye   She does wear contacts  Denies eye tenderness or headache, nausea associated with dark movie theater   States that she had her most recent eye exam within the last 2 months  There is concern for borderline glaucoma in both eyes    Recently changed to Trazodone for insomnia  Taking  mg each night, it has improved the insomnia    She does need to update lab work  Blood pressure has been stable with treatment.  Denies any chest pain, headaches, blurred vision, dizziness  She has started Weight Watchers and has managed to start losing weight     The following portions of the patient's history were reviewed and updated as appropriate: allergies, current medications, past family history, past medical history, past social history, past surgical history and problem list.    Review of Systems   Constitutional: Negative for chills and fever.   HENT: Negative.    Eyes: Positive for visual disturbance.   Respiratory: Negative for cough and shortness of breath.    Cardiovascular: Negative for chest pain, palpitations and leg swelling.   Gastrointestinal: Negative.    Endocrine: Negative for cold intolerance and heat intolerance.   Skin: Negative.    Neurological: Negative for dizziness, syncope, light-headedness and headaches.   Psychiatric/Behavioral: Positive for sleep disturbance. Negative for confusion and decreased concentration. The patient is not nervous/anxious.        Objective   Physical Exam   Constitutional: She is oriented to person, place, and time. She appears well-developed and well-nourished.   HENT:   Head: Normocephalic and atraumatic.   Right Ear: External ear normal.   Left Ear: External ear normal.   Nose: Nose normal.   Eyes: Conjunctivae and " EOM are normal. Pupils are equal, round, and reactive to light. Right eye exhibits no discharge. Left eye exhibits no discharge.   Cardiovascular: Normal rate, regular rhythm and normal heart sounds.    No murmur heard.  Pulmonary/Chest: Effort normal and breath sounds normal. No respiratory distress. She has no wheezes.   Neurological: She is alert and oriented to person, place, and time. No cranial nerve deficit.   Skin: Skin is warm and dry. No rash noted.   Psychiatric: She has a normal mood and affect. Her behavior is normal.   Nursing note and vitals reviewed.      Assessment/Plan   Connie was seen today for hypertension, hypothyroidism, spots and/or floaters and insomnia.    Diagnoses and all orders for this visit:    Hyperlipidemia, unspecified hyperlipidemia type  -     Comprehensive Metabolic Panel; Future  -     CBC & Differential; Future    Hyperglycemia  -     Comprehensive Metabolic Panel; Future  -     CBC & Differential; Future  -     Hemoglobin A1c; Future    Essential hypertension  -     Comprehensive Metabolic Panel; Future  -     CBC & Differential; Future    Acquired hypothyroidism  -     Thyroid Panel With TSH; Future  -     Comprehensive Metabolic Panel; Future  -     CBC & Differential; Future    Vitamin D deficiency  -     Comprehensive Metabolic Panel; Future  -     CBC & Differential; Future  -     Vitamin D 25 hydroxy; Future      No change in current therapy, states that she does not need refills at this time  Labs ordered, she will complete at a time that she is fasting  I did advise her to contact her ophthalmologist about her vision changes.  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

## 2017-03-06 DIAGNOSIS — I10 ESSENTIAL HYPERTENSION: ICD-10-CM

## 2017-03-06 DIAGNOSIS — E78.5 HYPERLIPIDEMIA, UNSPECIFIED HYPERLIPIDEMIA TYPE: ICD-10-CM

## 2017-03-06 DIAGNOSIS — E03.9 ACQUIRED HYPOTHYROIDISM: ICD-10-CM

## 2017-03-06 DIAGNOSIS — R73.9 HYPERGLYCEMIA: ICD-10-CM

## 2017-03-06 DIAGNOSIS — E55.9 VITAMIN D DEFICIENCY: ICD-10-CM

## 2017-03-06 LAB
25(OH)D3+25(OH)D2 SERPL-MCNC: 33.9 NG/ML
ALBUMIN SERPL-MCNC: 3.9 G/DL (ref 3.2–4.8)
ALBUMIN/GLOB SERPL: 1.6 G/DL (ref 1.5–2.5)
ALP SERPL-CCNC: 85 U/L (ref 25–100)
ALT SERPL-CCNC: 24 U/L (ref 7–40)
AST SERPL-CCNC: 25 U/L (ref 0–33)
BASOPHILS # BLD AUTO: 0.01 10*3/MM3 (ref 0–0.2)
BASOPHILS NFR BLD AUTO: 0.2 % (ref 0–1)
BILIRUB SERPL-MCNC: 0.6 MG/DL (ref 0.3–1.2)
BUN SERPL-MCNC: 18 MG/DL (ref 9–23)
BUN/CREAT SERPL: 22.5 (ref 7–25)
CALCIUM SERPL-MCNC: 9.7 MG/DL (ref 8.7–10.4)
CHLORIDE SERPL-SCNC: 102 MMOL/L (ref 99–109)
CO2 SERPL-SCNC: 31 MMOL/L (ref 20–31)
CREAT SERPL-MCNC: 0.8 MG/DL (ref 0.6–1.3)
EOSINOPHIL # BLD AUTO: 0.31 10*3/MM3 (ref 0.1–0.3)
EOSINOPHIL NFR BLD AUTO: 5.2 % (ref 0–3)
ERYTHROCYTE [DISTWIDTH] IN BLOOD BY AUTOMATED COUNT: 16.1 % (ref 11.3–14.5)
FT4I SERPL CALC-MCNC: 4.1 TBI
GLOBULIN SER CALC-MCNC: 2.5 GM/DL
GLUCOSE SERPL-MCNC: 170 MG/DL (ref 70–100)
HBA1C MFR BLD: 5.8 % (ref 4.8–5.6)
HCT VFR BLD AUTO: 41.5 % (ref 34.5–44)
HGB BLD-MCNC: 13.1 G/DL (ref 11.5–15.5)
IMM GRANULOCYTES # BLD: 0.01 10*3/MM3 (ref 0–0.03)
IMM GRANULOCYTES NFR BLD: 0.2 % (ref 0–0.6)
LYMPHOCYTES # BLD AUTO: 2.23 10*3/MM3 (ref 0.6–4.8)
LYMPHOCYTES NFR BLD AUTO: 37.3 % (ref 24–44)
MCH RBC QN AUTO: 27.7 PG (ref 27–31)
MCHC RBC AUTO-ENTMCNC: 31.6 G/DL (ref 32–36)
MCV RBC AUTO: 87.7 FL (ref 80–99)
MONOCYTES # BLD AUTO: 0.41 10*3/MM3 (ref 0–1)
MONOCYTES NFR BLD AUTO: 6.9 % (ref 0–12)
NEUTROPHILS # BLD AUTO: 3.01 10*3/MM3 (ref 1.5–8.3)
NEUTROPHILS NFR BLD AUTO: 50.2 % (ref 41–71)
PLATELET # BLD AUTO: 270 10*3/MM3 (ref 150–450)
POTASSIUM SERPL-SCNC: 3.7 MMOL/L (ref 3.5–5.5)
PROT SERPL-MCNC: 6.4 G/DL (ref 5.7–8.2)
RBC # BLD AUTO: 4.73 10*6/MM3 (ref 3.89–5.14)
SODIUM SERPL-SCNC: 141 MMOL/L (ref 132–146)
T3RU NFR SERPL: 40.4 % (ref 23–37)
T4 SERPL-MCNC: 10.2 MCG/DL (ref 4.7–11.4)
TSH SERPL DL<=0.005 MIU/L-ACNC: 0.14 MIU/ML (ref 0.35–5.35)
WBC # BLD AUTO: 5.98 10*3/MM3 (ref 3.5–10.8)

## 2017-03-12 DIAGNOSIS — E03.9 ACQUIRED HYPOTHYROIDISM: ICD-10-CM

## 2017-03-12 RX ORDER — LEVOTHYROXINE SODIUM 0.12 MG/1
137 TABLET ORAL DAILY
Qty: 30 TABLET | Refills: 2 | Status: SHIPPED | OUTPATIENT
Start: 2017-03-12 | End: 2017-06-05

## 2017-03-13 NOTE — PROGRESS NOTES
Spoke with pt and went over results/instructions. Verbalized understanding. She confirms she does not have a Hx of Thyroid CA or Ablation.

## 2017-03-25 ENCOUNTER — OFFICE VISIT (OUTPATIENT)
Dept: RETAIL CLINIC | Facility: CLINIC | Age: 62
End: 2017-03-25

## 2017-03-25 VITALS — OXYGEN SATURATION: 97 % | RESPIRATION RATE: 16 BRPM | TEMPERATURE: 98.5 F | HEART RATE: 88 BPM

## 2017-03-25 DIAGNOSIS — H65.04 RECURRENT ACUTE SEROUS OTITIS MEDIA OF RIGHT EAR: Primary | ICD-10-CM

## 2017-03-25 PROCEDURE — 99213 OFFICE O/P EST LOW 20 MIN: CPT | Performed by: NURSE PRACTITIONER

## 2017-03-25 NOTE — PATIENT INSTRUCTIONS
Serous Otitis Media  Serous otitis media is fluid in the middle ear space. This space contains the bones for hearing and air. Air in the middle ear space helps to transmit sound.   The air gets there through the eustachian tube. This tube goes from the back of the nose (nasopharynx) to the middle ear space. It keeps the pressure in the middle ear the same as the outside world. It also helps to drain fluid from the middle ear space.  CAUSES   Serous otitis media occurs when the eustachian tube gets blocked. Blockage can come from:  · Ear infections.  · Colds and other upper respiratory infections.  · Allergies.  · Irritants such as cigarette smoke.  · Sudden changes in air pressure (such as descending in an airplane).  · Enlarged adenoids.  · A mass in the nasopharynx.  During colds and upper respiratory infections, the middle ear space can become temporarily filled with fluid. This can happen after an ear infection also. Once the infection clears, the fluid will generally drain out of the ear through the eustachian tube. If it does not, then serous otitis media occurs.  SIGNS AND SYMPTOMS   · Hearing loss.  · A feeling of fullness in the ear, without pain.  · Young children may not show any symptoms but may show slight behavioral changes, such as agitation, ear pulling, or crying.  DIAGNOSIS   Serous otitis media is diagnosed by an ear exam. Tests may be done to check on the movement of the eardrum. Hearing exams may also be done.  TREATMENT   The fluid most often goes away without treatment. If allergy is the cause, allergy treatment may be helpful. Fluid that persists for several months may require minor surgery. A small tube is placed in the eardrum to:  · Drain the fluid.  · Restore the air in the middle ear space.  In certain situations, antibiotic medicines are used to avoid surgery. Surgery may be done to remove enlarged adenoids (if this is the cause).  HOME CARE INSTRUCTIONS   · Keep children away from  tobacco smoke.  · Keep all follow-up visits as directed by your health care provider.  SEEK MEDICAL CARE IF:   · Your hearing is not better in 3 months.  · Your hearing is worse.  · You have ear pain.  · You have drainage from the ear.  · You have dizziness.  · You have serous otitis media only in one ear or have any bleeding from your nose (epistaxis).  · You notice a lump on your neck.  MAKE SURE YOU:  · Understand these instructions.    · Will watch your condition.    · Will get help right away if you are not doing well or get worse.       This information is not intended to replace advice given to you by your health care provider. Make sure you discuss any questions you have with your health care provider.     Document Released: 03/09/2005 Document Revised: 01/08/2016 Document Reviewed: 07/15/2014  [employer] Interactive Patient Education ©2016 [employer]

## 2017-03-25 NOTE — PROGRESS NOTES
Connie Lu is 61 y.o. female      Earache    There is pain in the right ear. This is a recurrent problem. The current episode started in the past 7 days. The problem occurs every few hours. The problem has been unchanged. There has been no fever. The pain is mild. Associated symptoms include hearing loss. Pertinent negatives include no abdominal pain, coughing, diarrhea, ear discharge, headaches, neck pain, rash, rhinorrhea, sore throat or vomiting. Associated symptoms comments: Had a cold earlier this week with sore throat that improved.. She has tried nothing for the symptoms. Her past medical history is significant for hearing loss. There is no history of a chronic ear infection or a tympanostomy tube.             Current Outpatient Prescriptions:   •  DULoxetine (CYMBALTA) 30 MG capsule, TAKE 1 CAP QD WITH 60MG CAP QD FOR A TOTAL OF 90 MG QD, Disp: 30 capsule, Rfl: 2  •  DULoxetine (CYMBALTA) 60 MG capsule, Take 1 capsule by mouth Daily., Disp: 30 capsule, Rfl: 2  •  levothyroxine (SYNTHROID, LEVOTHROID) 125 MCG tablet, Take 1 tablet by mouth Daily., Disp: 30 tablet, Rfl: 2  •  losartan (COZAAR) 100 MG tablet, Take 1 tablet by mouth Daily., Disp: 30 tablet, Rfl: 2  •  traZODone (DESYREL) 50 MG tablet, Take 1-2 tablets by mouth Every Night., Disp: 30 tablet, Rfl: 2  •  triamterene-hydrochlorothiazide (DYAZIDE) 37.5-25 MG per capsule, Take 1 capsule by mouth Daily., Disp: 30 capsule, Rfl: 2        Allergies   Allergen Reactions   • Cephalexin            Past Medical History:   Diagnosis Date   • Anxiety    • Attention deficit disorder    • Breast cancer    • Depression    • Hypothyroidism    • Polyp of sigmoid colon            Past Surgical History:   Procedure Laterality Date   • MASTECTOMY WITH IMMEDIATE RECONSTRUCTION Left 2010   • TONSILLECTOMY AND ADENOIDECTOMY             Family History   Problem Relation Age of Onset   • Diabetes Father    • Leukemia Father    • Skin cancer Father    • Heart  disease Other    • Hypertension Other    • No Known Problems Brother            Social History     Social History   • Marital status: Single     Spouse name: N/A   • Number of children: N/A   • Years of education: N/A     Occupational History   • unemployed      Social History Main Topics   • Smoking status: Never Smoker   • Smokeless tobacco: Never Used   • Alcohol use No   • Drug use: No   • Sexual activity: Not on file     Other Topics Concern   • Not on file     Social History Narrative           Review of Systems   Constitutional: Negative.    HENT: Positive for congestion, ear pain, hearing loss and tinnitus. Negative for ear discharge, mouth sores, nosebleeds, postnasal drip, rhinorrhea, sinus pressure, sore throat, trouble swallowing and voice change.         Occassional ringing in both ears.   Eyes: Negative.    Respiratory: Negative.  Negative for cough.    Cardiovascular: Negative.    Gastrointestinal: Negative.  Negative for abdominal pain, diarrhea and vomiting.   Musculoskeletal: Negative for neck pain.   Skin: Negative for rash.   Allergic/Immunologic: Positive for environmental allergies.   Neurological: Negative for headaches.           Physical Exam   Constitutional: She is oriented to person, place, and time. She appears well-developed and well-nourished.  Non-toxic appearance. She does not have a sickly appearance. She does not appear ill. No distress.   HENT:   Head: Normocephalic and atraumatic.   Right Ear: Hearing, tympanic membrane, external ear and ear canal normal. No drainage or tenderness. No mastoid tenderness. Tympanic membrane is not injected, not scarred, not perforated, not erythematous, not retracted and not bulging. No middle ear effusion. No decreased hearing is noted.   Left Ear: Hearing, tympanic membrane, external ear and ear canal normal. No drainage or tenderness. No mastoid tenderness. Tympanic membrane is not injected, not scarred, not perforated, not erythematous, not  retracted and not bulging.  No middle ear effusion. No decreased hearing is noted.   Nose: Nose normal. Right sinus exhibits no maxillary sinus tenderness and no frontal sinus tenderness. Left sinus exhibits no maxillary sinus tenderness and no frontal sinus tenderness.   Mouth/Throat: Uvula is midline, oropharynx is clear and moist and mucous membranes are normal. No oropharyngeal exudate. No tonsillar exudate.   Minimal clear fluid bilateral.   Eyes: Conjunctivae are normal.   Neck: Neck supple.   Cardiovascular: Normal rate, regular rhythm and normal heart sounds.  Exam reveals no gallop and no friction rub.    No murmur heard.  Pulmonary/Chest: Effort normal and breath sounds normal. No respiratory distress. She has no wheezes. She has no rales. She exhibits no tenderness.   Lymphadenopathy:     She has no cervical adenopathy.   Neurological: She is alert and oriented to person, place, and time.   Skin: Skin is warm and dry.   Psychiatric: She has a normal mood and affect. Her behavior is normal.           Connie was seen today for earache.    Diagnoses and all orders for this visit:    Recurrent acute serous otitis media of right ear      Advised to take antihistamines OTC as directed. Humidification and chewing gum to relieve pressure of fluid.  Advised follow up with ENT and/or audiologist to test hearing.    Education instructions given to patient per AVS diagnosis. Patient verbalizes understanding of instructions.

## 2017-04-04 RX ORDER — TRIAMTERENE AND HYDROCHLOROTHIAZIDE 37.5; 25 MG/1; MG/1
TABLET ORAL
Qty: 30 TABLET | Refills: 2 | Status: SHIPPED | OUTPATIENT
Start: 2017-04-04 | End: 2017-08-08 | Stop reason: SDUPTHER

## 2017-04-12 DIAGNOSIS — F51.01 PRIMARY INSOMNIA: ICD-10-CM

## 2017-04-13 RX ORDER — TRAZODONE HYDROCHLORIDE 50 MG/1
TABLET ORAL
Qty: 30 TABLET | Refills: 2 | Status: SHIPPED | OUTPATIENT
Start: 2017-04-13 | End: 2017-04-20 | Stop reason: SDUPTHER

## 2017-04-20 DIAGNOSIS — F51.01 PRIMARY INSOMNIA: ICD-10-CM

## 2017-04-20 RX ORDER — TRAZODONE HYDROCHLORIDE 50 MG/1
TABLET ORAL
Qty: 30 TABLET | Refills: 2 | Status: SHIPPED | OUTPATIENT
Start: 2017-04-20 | End: 2017-07-27 | Stop reason: SDUPTHER

## 2017-05-10 DIAGNOSIS — I10 ESSENTIAL HYPERTENSION: ICD-10-CM

## 2017-05-10 DIAGNOSIS — F34.1 DYSTHYMIA: ICD-10-CM

## 2017-05-10 DIAGNOSIS — F41.9 ANXIETY: ICD-10-CM

## 2017-05-10 RX ORDER — DULOXETIN HYDROCHLORIDE 30 MG/1
CAPSULE, DELAYED RELEASE ORAL
Qty: 30 CAPSULE | Refills: 0 | Status: SHIPPED | OUTPATIENT
Start: 2017-05-10 | End: 2017-05-15 | Stop reason: SDUPTHER

## 2017-05-10 RX ORDER — LOSARTAN POTASSIUM 100 MG/1
TABLET ORAL
Qty: 30 TABLET | Refills: 0 | Status: SHIPPED | OUTPATIENT
Start: 2017-05-10 | End: 2017-05-15 | Stop reason: SDUPTHER

## 2017-05-10 RX ORDER — LEVOTHYROXINE SODIUM 137 UG/1
TABLET ORAL
Qty: 30 TABLET | Refills: 0 | Status: SHIPPED | OUTPATIENT
Start: 2017-05-10 | End: 2017-05-15 | Stop reason: SDUPTHER

## 2017-05-15 DIAGNOSIS — F41.9 ANXIETY: ICD-10-CM

## 2017-05-15 DIAGNOSIS — I10 ESSENTIAL HYPERTENSION: ICD-10-CM

## 2017-05-15 DIAGNOSIS — F34.1 DYSTHYMIA: ICD-10-CM

## 2017-05-15 RX ORDER — DULOXETIN HYDROCHLORIDE 30 MG/1
CAPSULE, DELAYED RELEASE ORAL
Qty: 30 CAPSULE | Refills: 0 | Status: SHIPPED | OUTPATIENT
Start: 2017-05-15 | End: 2017-06-05 | Stop reason: SDUPTHER

## 2017-05-15 RX ORDER — LOSARTAN POTASSIUM 100 MG/1
TABLET ORAL
Qty: 30 TABLET | Refills: 0 | Status: SHIPPED | OUTPATIENT
Start: 2017-05-15 | End: 2017-06-05 | Stop reason: SDUPTHER

## 2017-05-15 RX ORDER — LEVOTHYROXINE SODIUM 137 UG/1
TABLET ORAL
Qty: 30 TABLET | Refills: 0 | Status: SHIPPED | OUTPATIENT
Start: 2017-05-15 | End: 2017-06-05

## 2017-06-05 ENCOUNTER — OFFICE VISIT (OUTPATIENT)
Dept: FAMILY MEDICINE CLINIC | Facility: CLINIC | Age: 62
End: 2017-06-05

## 2017-06-05 VITALS
OXYGEN SATURATION: 95 % | TEMPERATURE: 97.2 F | DIASTOLIC BLOOD PRESSURE: 76 MMHG | RESPIRATION RATE: 18 BRPM | HEART RATE: 82 BPM | WEIGHT: 228.5 LBS | BODY MASS INDEX: 44.63 KG/M2 | SYSTOLIC BLOOD PRESSURE: 108 MMHG

## 2017-06-05 DIAGNOSIS — I10 ESSENTIAL HYPERTENSION: ICD-10-CM

## 2017-06-05 DIAGNOSIS — F41.9 ANXIETY: ICD-10-CM

## 2017-06-05 DIAGNOSIS — Z12.31 ENCOUNTER FOR SCREENING MAMMOGRAM FOR BREAST CANCER: ICD-10-CM

## 2017-06-05 DIAGNOSIS — F34.1 DYSTHYMIA: ICD-10-CM

## 2017-06-05 DIAGNOSIS — E03.9 ACQUIRED HYPOTHYROIDISM: Primary | ICD-10-CM

## 2017-06-05 DIAGNOSIS — M17.0 BILATERAL PRIMARY OSTEOARTHRITIS OF KNEE: ICD-10-CM

## 2017-06-05 DIAGNOSIS — R73.9 HYPERGLYCEMIA: ICD-10-CM

## 2017-06-05 PROCEDURE — 99214 OFFICE O/P EST MOD 30 MIN: CPT | Performed by: FAMILY MEDICINE

## 2017-06-05 RX ORDER — LEVOTHYROXINE SODIUM 137 UG/1
137 TABLET ORAL DAILY
Qty: 30 TABLET | Refills: 0 | COMMUNITY
Start: 2017-06-05 | End: 2017-06-06 | Stop reason: SDUPTHER

## 2017-06-05 RX ORDER — LOSARTAN POTASSIUM 100 MG/1
100 TABLET ORAL DAILY
Qty: 30 TABLET | Refills: 5 | Status: SHIPPED | OUTPATIENT
Start: 2017-06-05 | End: 2018-07-10 | Stop reason: SDUPTHER

## 2017-06-05 RX ORDER — DULOXETIN HYDROCHLORIDE 30 MG/1
30 CAPSULE, DELAYED RELEASE ORAL DAILY
Qty: 30 CAPSULE | Refills: 5 | Status: SHIPPED | OUTPATIENT
Start: 2017-06-05 | End: 2018-02-22 | Stop reason: SDUPTHER

## 2017-06-05 RX ORDER — DULOXETIN HYDROCHLORIDE 60 MG/1
60 CAPSULE, DELAYED RELEASE ORAL DAILY
Qty: 30 CAPSULE | Refills: 5 | Status: SHIPPED | OUTPATIENT
Start: 2017-06-05 | End: 2018-05-08

## 2017-06-05 NOTE — PROGRESS NOTES
Chief Complaint   Patient presents with   • Anxiety   • Hypothyroidism   • Med Refill       Subjective     Hypertension   This is a chronic problem. The current episode started more than 1 year ago. The problem is unchanged. The problem is controlled. Associated symptoms include malaise/fatigue. Pertinent negatives include no chest pain, palpitations, peripheral edema or shortness of breath. There are no associated agents to hypertension. Past treatments include angiotensin blockers and diuretics. The current treatment provides moderate improvement. There are no compliance problems.  There is no history of angina, kidney disease or CAD/MI. There is no history of chronic renal disease.       Hypothyroidism  Stable.  Energy level is a little bit tired today but thinks maybe because she did not sleep.  She does not decrease the levothyroxine.  Wires Crossed and she did not .  She changed pharmacies and did not know she had a new one.  Otherwise feeling well.  No chest pain or shortness of breath.        Knee pain   Left leg pain  Severe left knee issues . Bone on bine  Left upper leg and over and under knee, feels like going to spasm and gets tight and hurts and aches  Tried injections and knee braces  no surg at this time  Different shot was not approved  Had seen Dr Nova.   Knee is getting worse now and gives out.   She will call for an appt.     Hyperglycemia  Has had some mild increased blood sugar in the past.  A1c mildly increased last check 4 months ago.  Needs to have recheck.    depression and anxiety.  Doing well on the Cymbalta.  Takes 30 mg at night and 60 mg during the day.  Working well.  No side effects.  Mood is better.  No reported suicidal ideation.    Past Medical History,Medications, Allergies, and social history was reviewed.    Review of Systems   Constitutional: Positive for fatigue and malaise/fatigue. Negative for unexpected weight change.   HENT: Negative.    Eyes: Negative.     Respiratory: Negative.  Negative for shortness of breath.    Cardiovascular: Negative.  Negative for chest pain and palpitations.   Gastrointestinal: Negative.    Endocrine: Negative.    Genitourinary: Negative.    Musculoskeletal: Negative.    Neurological: Negative.    Psychiatric/Behavioral: Negative.        Objective     Vitals:    06/05/17 1520   BP: 108/76   Pulse: 82   Resp: 18   Temp: 97.2 °F (36.2 °C)   TempSrc: Temporal Artery    SpO2: 95%   Weight: 228 lb 8 oz (104 kg)        Physical Exam   Constitutional: She is oriented to person, place, and time. She appears well-developed and well-nourished.   Obese   HENT:   Head: Normocephalic and atraumatic.   Right Ear: Hearing, tympanic membrane, external ear and ear canal normal.   Left Ear: Hearing, tympanic membrane, external ear and ear canal normal.   Mouth/Throat: Oropharynx is clear and moist.   Eyes: Conjunctivae and EOM are normal. Pupils are equal, round, and reactive to light.   Neck: Normal range of motion. Neck supple. No thyromegaly present.   Cardiovascular: Normal rate, regular rhythm and normal heart sounds.  Exam reveals no gallop and no friction rub.    No murmur heard.  Pulmonary/Chest: Effort normal and breath sounds normal. No respiratory distress. She has no wheezes. She has no rales.   Abdominal: Soft. Bowel sounds are normal.   Musculoskeletal:   Decreased range of motion of both knees.  Some mild crepitus.  Antalgic gait.   Neurological: She is alert and oriented to person, place, and time.   Skin: Skin is warm and dry.   Psychiatric: She has a normal mood and affect. Her behavior is normal.   Nursing note and vitals reviewed.        Assessment/Plan     Problem List Items Addressed This Visit        Cardiovascular and Mediastinum    Hypertension    Relevant Medications    losartan (COZAAR) 100 MG tablet       Endocrine    Hypothyroidism - Primary    Relevant Medications    levothyroxine (SYNTHROID, LEVOTHROID) 137 MCG tablet    Other  Relevant Orders    TSH       Other    Depression    Relevant Medications    DULoxetine (CYMBALTA) 30 MG capsule    DULoxetine (CYMBALTA) 60 MG capsule    Hyperglycemia    Relevant Orders    Hemoglobin A1c    Anxiety    Relevant Medications    DULoxetine (CYMBALTA) 30 MG capsule    DULoxetine (CYMBALTA) 60 MG capsule      Other Visit Diagnoses     Bilateral primary osteoarthritis of knee        Relevant Orders    Ambulatory Referral to Orthopedic Surgery    Encounter for screening mammogram for breast cancer        Relevant Orders    Mammo Screening Digital Tomosynthesis Bilateral With CAD        Refer back to orthopedics for evaluation of knee pain and significant arthritis.  She agrees.    Depression and anxiety.  Stable.  Continue medication.    Hyperglycemia.  Check A1c.    Screening mammogram to be set up.    Hypothyroidism.  Recheck TSH.  She did not start the new dose of medication during last check.    Hypertension.  Stable.  Continue medication.     Follow-up pending blood work results    MEDICATIONS PRESCRIBED  Requested Prescriptions     Signed Prescriptions Disp Refills   • DULoxetine (CYMBALTA) 30 MG capsule 30 capsule 5     Sig: Take 1 capsule by mouth Daily. take with 60 mg duloxetine   • DULoxetine (CYMBALTA) 60 MG capsule 30 capsule 5     Sig: Take 1 capsule by mouth Daily.   • losartan (COZAAR) 100 MG tablet 30 tablet 5     Sig: Take 1 tablet by mouth Daily.          Masoud Martni MD

## 2017-06-06 DIAGNOSIS — E03.9 ACQUIRED HYPOTHYROIDISM: ICD-10-CM

## 2017-06-06 LAB
HBA1C MFR BLD: 5 % (ref 4.8–5.6)
TSH SERPL DL<=0.005 MIU/L-ACNC: 1.55 MIU/ML (ref 0.35–5.35)

## 2017-06-06 RX ORDER — LEVOTHYROXINE SODIUM 137 UG/1
137 TABLET ORAL DAILY
Qty: 30 TABLET | Refills: 5 | Status: SHIPPED | OUTPATIENT
Start: 2017-06-06 | End: 2018-02-22 | Stop reason: SDUPTHER

## 2017-07-06 RX ORDER — LEVOTHYROXINE SODIUM 137 UG/1
TABLET ORAL
Qty: 30 TABLET | Refills: 3 | Status: SHIPPED | OUTPATIENT
Start: 2017-07-06 | End: 2018-06-28 | Stop reason: SDUPTHER

## 2017-07-17 ENCOUNTER — RESULTS ENCOUNTER (OUTPATIENT)
Dept: FAMILY MEDICINE CLINIC | Facility: CLINIC | Age: 62
End: 2017-07-17

## 2017-07-17 DIAGNOSIS — E03.9 ACQUIRED HYPOTHYROIDISM: ICD-10-CM

## 2017-07-27 DIAGNOSIS — F51.01 PRIMARY INSOMNIA: ICD-10-CM

## 2017-07-27 RX ORDER — TRAZODONE HYDROCHLORIDE 50 MG/1
TABLET ORAL
Qty: 30 TABLET | Refills: 2 | Status: SHIPPED | OUTPATIENT
Start: 2017-07-27 | End: 2017-10-30 | Stop reason: SDUPTHER

## 2017-08-08 RX ORDER — TRIAMTERENE AND HYDROCHLOROTHIAZIDE 37.5; 25 MG/1; MG/1
TABLET ORAL
Qty: 30 TABLET | Refills: 0 | Status: SHIPPED | OUTPATIENT
Start: 2017-08-08 | End: 2017-09-05 | Stop reason: SDUPTHER

## 2017-08-24 DIAGNOSIS — F51.01 PRIMARY INSOMNIA: ICD-10-CM

## 2017-08-24 RX ORDER — TRAZODONE HYDROCHLORIDE 50 MG/1
TABLET ORAL
Qty: 30 TABLET | Refills: 2 | Status: SHIPPED | OUTPATIENT
Start: 2017-08-24 | End: 2017-10-30 | Stop reason: SDUPTHER

## 2017-09-05 RX ORDER — TRIAMTERENE AND HYDROCHLOROTHIAZIDE 37.5; 25 MG/1; MG/1
TABLET ORAL
Qty: 30 TABLET | Refills: 0 | Status: SHIPPED | OUTPATIENT
Start: 2017-09-05 | End: 2017-09-07 | Stop reason: SDUPTHER

## 2017-09-07 RX ORDER — TRIAMTERENE AND HYDROCHLOROTHIAZIDE 37.5; 25 MG/1; MG/1
TABLET ORAL
Qty: 30 TABLET | Refills: 0 | Status: SHIPPED | OUTPATIENT
Start: 2017-09-07 | End: 2017-10-04 | Stop reason: SDUPTHER

## 2017-09-19 DIAGNOSIS — F51.01 PRIMARY INSOMNIA: ICD-10-CM

## 2017-09-19 RX ORDER — TRAZODONE HYDROCHLORIDE 50 MG/1
TABLET ORAL
Qty: 30 TABLET | Refills: 0 | OUTPATIENT
Start: 2017-09-19

## 2017-09-19 NOTE — TELEPHONE ENCOUNTER
SPOKE WITH PT AND SHE DOESN'T NEED THIS REFILL. SHE WAS ALREADY GIVEN ONE. PT IS ONLY TAKING ONE.

## 2017-09-19 NOTE — TELEPHONE ENCOUNTER
please call and confirm if she has been needing to take 2 of the trazodone. If yes, I can change the Rx so she can get enough for a month at a time. bds

## 2017-10-04 RX ORDER — TRIAMTERENE AND HYDROCHLOROTHIAZIDE 37.5; 25 MG/1; MG/1
TABLET ORAL
Qty: 30 TABLET | Refills: 2 | Status: SHIPPED | OUTPATIENT
Start: 2017-10-04 | End: 2018-09-10 | Stop reason: SDUPTHER

## 2017-10-05 RX ORDER — TRIAMTERENE AND HYDROCHLOROTHIAZIDE 37.5; 25 MG/1; MG/1
TABLET ORAL
Qty: 30 TABLET | Refills: 2 | Status: SHIPPED | OUTPATIENT
Start: 2017-10-05 | End: 2018-02-22 | Stop reason: SDUPTHER

## 2017-10-30 DIAGNOSIS — F51.01 PRIMARY INSOMNIA: ICD-10-CM

## 2017-10-30 RX ORDER — TRAZODONE HYDROCHLORIDE 50 MG/1
TABLET ORAL
Qty: 30 TABLET | Refills: 0 | Status: SHIPPED | OUTPATIENT
Start: 2017-10-30 | End: 2018-04-10 | Stop reason: SDUPTHER

## 2017-10-31 DIAGNOSIS — F51.01 PRIMARY INSOMNIA: ICD-10-CM

## 2017-10-31 RX ORDER — TRAZODONE HYDROCHLORIDE 50 MG/1
TABLET ORAL
Qty: 30 TABLET | Refills: 0 | OUTPATIENT
Start: 2017-10-31

## 2017-11-13 DIAGNOSIS — F51.01 PRIMARY INSOMNIA: ICD-10-CM

## 2017-11-13 RX ORDER — TRAZODONE HYDROCHLORIDE 50 MG/1
TABLET ORAL
Qty: 30 TABLET | Refills: 0 | OUTPATIENT
Start: 2017-11-13

## 2017-11-13 NOTE — TELEPHONE ENCOUNTER
Please call patient and confirm dosage of trazodone.  Is she taking 1 or 2 of the trazodone 50 mg at bedtime?

## 2017-11-14 DIAGNOSIS — F51.01 PRIMARY INSOMNIA: ICD-10-CM

## 2017-11-14 RX ORDER — TRAZODONE HYDROCHLORIDE 50 MG/1
TABLET ORAL
Qty: 30 TABLET | Refills: 0 | OUTPATIENT
Start: 2017-11-14

## 2017-11-15 DIAGNOSIS — F51.01 PRIMARY INSOMNIA: ICD-10-CM

## 2017-11-15 RX ORDER — TRAZODONE HYDROCHLORIDE 50 MG/1
TABLET ORAL
Qty: 30 TABLET | Refills: 0 | OUTPATIENT
Start: 2017-11-15

## 2017-11-20 DIAGNOSIS — F34.1 DYSTHYMIA: ICD-10-CM

## 2017-11-20 DIAGNOSIS — F41.9 ANXIETY: ICD-10-CM

## 2017-11-20 RX ORDER — DULOXETIN HYDROCHLORIDE 30 MG/1
CAPSULE, DELAYED RELEASE ORAL
Qty: 30 CAPSULE | Refills: 0 | OUTPATIENT
Start: 2017-11-20

## 2017-11-21 DIAGNOSIS — F34.1 DYSTHYMIA: ICD-10-CM

## 2017-11-21 DIAGNOSIS — I10 ESSENTIAL HYPERTENSION: ICD-10-CM

## 2017-11-21 DIAGNOSIS — F41.9 ANXIETY: ICD-10-CM

## 2017-11-21 RX ORDER — LOSARTAN POTASSIUM 100 MG/1
TABLET ORAL
Qty: 30 TABLET | Refills: 0 | OUTPATIENT
Start: 2017-11-21

## 2017-11-21 RX ORDER — DULOXETIN HYDROCHLORIDE 60 MG/1
CAPSULE, DELAYED RELEASE ORAL
Qty: 30 CAPSULE | Refills: 0 | OUTPATIENT
Start: 2017-11-21

## 2017-11-21 RX ORDER — DULOXETIN HYDROCHLORIDE 30 MG/1
CAPSULE, DELAYED RELEASE ORAL
Qty: 30 CAPSULE | Refills: 0 | OUTPATIENT
Start: 2017-11-21

## 2017-11-22 DIAGNOSIS — F34.1 DYSTHYMIA: ICD-10-CM

## 2017-11-22 DIAGNOSIS — F41.9 ANXIETY: ICD-10-CM

## 2017-11-22 DIAGNOSIS — I10 ESSENTIAL HYPERTENSION: ICD-10-CM

## 2017-11-22 RX ORDER — LOSARTAN POTASSIUM 100 MG/1
TABLET ORAL
Qty: 30 TABLET | Refills: 0 | OUTPATIENT
Start: 2017-11-22

## 2017-11-22 RX ORDER — DULOXETIN HYDROCHLORIDE 60 MG/1
CAPSULE, DELAYED RELEASE ORAL
Qty: 30 CAPSULE | Refills: 0 | OUTPATIENT
Start: 2017-11-22

## 2017-11-23 DIAGNOSIS — F41.9 ANXIETY: ICD-10-CM

## 2017-11-23 DIAGNOSIS — F34.1 DYSTHYMIA: ICD-10-CM

## 2017-11-25 RX ORDER — DULOXETIN HYDROCHLORIDE 60 MG/1
CAPSULE, DELAYED RELEASE ORAL
Qty: 30 CAPSULE | Refills: 0 | OUTPATIENT
Start: 2017-11-25

## 2017-12-13 DIAGNOSIS — F34.1 DYSTHYMIA: ICD-10-CM

## 2017-12-13 DIAGNOSIS — F41.9 ANXIETY: ICD-10-CM

## 2017-12-13 RX ORDER — DULOXETIN HYDROCHLORIDE 60 MG/1
CAPSULE, DELAYED RELEASE ORAL
Qty: 30 CAPSULE | Refills: 0 | OUTPATIENT
Start: 2017-12-13

## 2018-01-11 DIAGNOSIS — F41.9 ANXIETY: ICD-10-CM

## 2018-01-11 DIAGNOSIS — F34.1 DYSTHYMIA: ICD-10-CM

## 2018-01-11 RX ORDER — DULOXETIN HYDROCHLORIDE 30 MG/1
CAPSULE, DELAYED RELEASE ORAL
Qty: 30 CAPSULE | Refills: 0 | OUTPATIENT
Start: 2018-01-11

## 2018-01-12 DIAGNOSIS — F41.9 ANXIETY: ICD-10-CM

## 2018-01-12 DIAGNOSIS — F34.1 DYSTHYMIA: ICD-10-CM

## 2018-01-12 RX ORDER — DULOXETIN HYDROCHLORIDE 30 MG/1
CAPSULE, DELAYED RELEASE ORAL
Qty: 30 CAPSULE | Refills: 0 | Status: SHIPPED | OUTPATIENT
Start: 2018-01-12 | End: 2018-03-05

## 2018-02-22 ENCOUNTER — OFFICE VISIT (OUTPATIENT)
Dept: FAMILY MEDICINE CLINIC | Facility: CLINIC | Age: 63
End: 2018-02-22

## 2018-02-22 VITALS
TEMPERATURE: 98 F | HEART RATE: 75 BPM | BODY MASS INDEX: 47.17 KG/M2 | OXYGEN SATURATION: 98 % | SYSTOLIC BLOOD PRESSURE: 120 MMHG | DIASTOLIC BLOOD PRESSURE: 80 MMHG | RESPIRATION RATE: 14 BRPM | HEIGHT: 59 IN | WEIGHT: 234 LBS

## 2018-02-22 DIAGNOSIS — R53.83 OTHER FATIGUE: ICD-10-CM

## 2018-02-22 DIAGNOSIS — I10 ESSENTIAL HYPERTENSION: ICD-10-CM

## 2018-02-22 DIAGNOSIS — E03.9 ACQUIRED HYPOTHYROIDISM: ICD-10-CM

## 2018-02-22 DIAGNOSIS — IMO0001 CLASS 3 OBESITY DUE TO EXCESS CALORIES WITH BODY MASS INDEX (BMI) OF 45.0 TO 49.9 IN ADULT, UNSPECIFIED WHETHER SERIOUS COMORBIDITY PRESENT: Primary | ICD-10-CM

## 2018-02-22 DIAGNOSIS — E78.00 PURE HYPERCHOLESTEROLEMIA: ICD-10-CM

## 2018-02-22 DIAGNOSIS — L65.9 HAIR LOSS: ICD-10-CM

## 2018-02-22 LAB
ALBUMIN SERPL-MCNC: 4 G/DL (ref 3.2–4.8)
ALBUMIN/GLOB SERPL: 1.5 G/DL (ref 1.5–2.5)
ALP SERPL-CCNC: 107 U/L (ref 25–100)
ALT SERPL-CCNC: 19 U/L (ref 7–40)
AST SERPL-CCNC: 22 U/L (ref 0–33)
BILIRUB SERPL-MCNC: 0.3 MG/DL (ref 0.3–1.2)
BUN SERPL-MCNC: 24 MG/DL (ref 9–23)
BUN/CREAT SERPL: 24 (ref 7–25)
CALCIUM SERPL-MCNC: 9.5 MG/DL (ref 8.7–10.4)
CHLORIDE SERPL-SCNC: 100 MMOL/L (ref 99–109)
CHOLEST SERPL-MCNC: 184 MG/DL (ref 0–200)
CHOLEST/HDLC SERPL: 2.83 {RATIO}
CO2 SERPL-SCNC: 34 MMOL/L (ref 20–31)
CREAT SERPL-MCNC: 1 MG/DL (ref 0.6–1.3)
FERRITIN SERPL-MCNC: 24 NG/ML (ref 10–291)
GFR SERPLBLD CREATININE-BSD FMLA CKD-EPI: 57 ML/MIN/1.73
GFR SERPLBLD CREATININE-BSD FMLA CKD-EPI: 69 ML/MIN/1.73
GLOBULIN SER CALC-MCNC: 2.7 GM/DL
GLUCOSE SERPL-MCNC: 111 MG/DL (ref 70–100)
HDLC SERPL-MCNC: 65 MG/DL (ref 40–60)
IRON SATN MFR SERPL: 10 % (ref 15–50)
IRON SERPL-MCNC: 37 MCG/DL (ref 50–175)
LDLC SERPL CALC-MCNC: 91 MG/DL (ref 0–100)
POTASSIUM SERPL-SCNC: 4.3 MMOL/L (ref 3.5–5.5)
PROT SERPL-MCNC: 6.7 G/DL (ref 5.7–8.2)
SODIUM SERPL-SCNC: 141 MMOL/L (ref 132–146)
TIBC SERPL-MCNC: 382 MCG/DL (ref 250–450)
TRIGL SERPL-MCNC: 138 MG/DL (ref 0–150)
TSH SERPL DL<=0.005 MIU/L-ACNC: 1.14 MIU/ML (ref 0.35–5.35)
UIBC SERPL-MCNC: 345 MCG/DL
VLDLC SERPL CALC-MCNC: 27.6 MG/DL

## 2018-02-22 PROCEDURE — 99214 OFFICE O/P EST MOD 30 MIN: CPT | Performed by: FAMILY MEDICINE

## 2018-02-22 NOTE — PROGRESS NOTES
Assessment/Plan     Problem List Items Addressed This Visit        Cardiovascular and Mediastinum    Hyperlipidemia    Relevant Orders    Comprehensive Metabolic Panel    Lipid Panel With / Chol / HDL Ratio    Hypertension    Relevant Orders    Comprehensive Metabolic Panel       Endocrine    Hypothyroidism    Relevant Orders    TSH      Other Visit Diagnoses     Class 3 obesity due to excess calories with body mass index (BMI) of 45.0 to 49.9 in adult, unspecified whether serious comorbidity present    -  Primary    Relevant Orders    Ambulatory Referral to Bariatric Surgery    Hair loss        Relevant Orders    Iron and TIBC    Ferritin    Other fatigue        Relevant Orders    Comprehensive Metabolic Panel    TSH    Iron and TIBC    Ferritin           Follow up: Return if symptoms worsen or fail to improve, for follow up depends on review of labs and testing.     DISCUSSION  Obesity evaluation.  Recommend refer to bariatric surgery for evaluation for possible gastric sleeve.  Explained that she may need to come in monthly for a 6 month weight follow-up to be determined by her insurance requirements.    Hypertension.  Stable.  Continue medication.    Hyperlipidemia.  Check labs.    Hypothyroidism.  Check TSH.    Hair loss and fatigue.  Check labs as noted including iron and ferritin.      MEDICATIONS PRESCRIBED  Requested Prescriptions      No prescriptions requested or ordered in this encounter        -------------------------------------------    Subjective     Chief Complaint   Patient presents with   • Weight Gain     discussing the gastric sleeve done.       Hypertension   This is a chronic problem. The current episode started more than 1 year ago. The problem is unchanged. The problem is controlled. Pertinent negatives include no chest pain, peripheral edema or shortness of breath. There are no associated agents to hypertension. The current treatment provides moderate improvement. There is no history of  "angina, kidney disease or CAD/MI. There is no history of chronic renal disease.   Hypothyroidism   This is a chronic problem. The current episode started more than 1 year ago. The problem occurs daily. The problem has been unchanged. Associated symptoms include fatigue. Pertinent negatives include no chest pain. Treatments tried: Levothyroxin. The treatment provided moderate relief.       Obesity  Wants to consider bariatric gastric sleeve surgery  Diets: weight watchers, would lose wt and then stop and weight increase again.,  Exercise: no regular exercise due to back and leg pain  Wt stable for last year  Max wt was 268 8 yrs ago  Diet may cause diarrhea and no clear cause    Hair loss  Has been losing hair more lately as well.  Fatigue.  On thyroid medication.  No rashes.          Past Medical History,Medications, Allergies, and social history was reviewed.    Review of Systems   Constitutional: Positive for fatigue and unexpected weight change (increased weight).   HENT: Negative.    Eyes: Negative.    Respiratory: Negative.  Negative for shortness of breath.    Cardiovascular: Negative.  Negative for chest pain.   Gastrointestinal: Negative.    Endocrine: Negative.    Genitourinary: Negative.    Musculoskeletal: Negative.    Neurological: Negative.    Psychiatric/Behavioral: Negative.        Objective     Vitals:    02/22/18 0938   BP: 120/80   Pulse: 75   Resp: 14   Temp: 98 °F (36.7 °C)   TempSrc: Temporal Artery    SpO2: 98%   Weight: 106 kg (234 lb)   Height: 151.1 cm (59.49\")        Physical Exam   Constitutional: She is oriented to person, place, and time. She appears well-developed and well-nourished.   obese   HENT:   Head: Normocephalic and atraumatic.   Right Ear: Hearing, tympanic membrane, external ear and ear canal normal.   Left Ear: Hearing, tympanic membrane, external ear and ear canal normal.   Mouth/Throat: Oropharynx is clear and moist.   Eyes: Conjunctivae and EOM are normal. Pupils are " equal, round, and reactive to light.   Neck: Normal range of motion. Neck supple. No thyromegaly present.   Cardiovascular: Normal rate, regular rhythm and normal heart sounds.  Exam reveals no gallop and no friction rub.    No murmur heard.  Pulmonary/Chest: Effort normal and breath sounds normal. No respiratory distress. She has no wheezes. She has no rales.   Abdominal: Soft. Bowel sounds are normal. She exhibits no distension. There is no tenderness. There is no rebound and no guarding.   Musculoskeletal: She exhibits no edema.   Neurological: She is alert and oriented to person, place, and time.   Skin: Skin is warm and dry.   Psychiatric: She has a normal mood and affect. Her behavior is normal.   Nursing note and vitals reviewed.              Masoud Martin MD

## 2018-02-26 DIAGNOSIS — E61.1 IRON DEFICIENCY: ICD-10-CM

## 2018-02-26 DIAGNOSIS — N28.9 RENAL INSUFFICIENCY: Primary | ICD-10-CM

## 2018-02-27 ENCOUNTER — TELEPHONE (OUTPATIENT)
Dept: FAMILY MEDICINE CLINIC | Facility: CLINIC | Age: 63
End: 2018-02-27

## 2018-02-27 RX ORDER — FERROUS SULFATE 325(65) MG
325 TABLET ORAL
Qty: 30 TABLET | Refills: 2 | Status: SHIPPED | OUTPATIENT
Start: 2018-02-27 | End: 2018-05-21 | Stop reason: SDUPTHER

## 2018-02-27 RX ORDER — AZITHROMYCIN 250 MG/1
TABLET, FILM COATED ORAL
Qty: 6 TABLET | Refills: 0 | Status: SHIPPED | OUTPATIENT
Start: 2018-02-27 | End: 2018-03-05

## 2018-02-27 RX ORDER — DEXTROMETHORPHAN HYDROBROMIDE AND PROMETHAZINE HYDROCHLORIDE 15; 6.25 MG/5ML; MG/5ML
SYRUP ORAL
Qty: 180 ML | Refills: 0 | Status: SHIPPED | OUTPATIENT
Start: 2018-02-27 | End: 2018-03-05

## 2018-02-27 NOTE — TELEPHONE ENCOUNTER
----- Message from Kiera Munoz sent at 2/27/2018 11:11 AM EST -----  Contact: Smith  Patient would like Dr. Martin's nurse to give her a call. She would not elaborate. Please sourav patient at [phone].

## 2018-02-27 NOTE — TELEPHONE ENCOUNTER
SPOKE WITH PT AND WENT OVER RESULTS AND MESSAGE WITH PT. PT IS OK TO TAKE IRON AND NO ALLERGIES TO ZPAK. PT STATES COUGH AND FEVER Friday MORNING BUT NO FEVER SINCE THEN. PT VERBALIZED UNDERSTANDING.

## 2018-02-27 NOTE — TELEPHONE ENCOUNTER
PT IS COUGHING AND NOT ABLE TO REST DUE TO UP COUGHING ALL NIGHT. EAR PAIN IN RT EAR, NASAL DRAINAGE WITH YELLOW IN COLOR.     PT IS STATING SHE NEEDS A PA ON DULOXETINE AND PT HAS BEEN ON THIS FOR 2 YRS AND SHE HAS TRIED AND FAILED LEXAPRO AND PROZAC. PT WAS INFORMED I'D TRY TO WORK ON PA THIS AFTERNOON.      PT ALSO ASK FOR A REFERRAL FOR WEIGHT LOSS AND SHE WASN'T SURE ABOUT IF THAT WAS PLACED OR NOT. PLEASE ADVISE ON THESE ISSUES.

## 2018-03-05 ENCOUNTER — OFFICE VISIT (OUTPATIENT)
Dept: FAMILY MEDICINE CLINIC | Facility: CLINIC | Age: 63
End: 2018-03-05

## 2018-03-05 VITALS
TEMPERATURE: 96.8 F | BODY MASS INDEX: 46.77 KG/M2 | RESPIRATION RATE: 16 BRPM | HEIGHT: 59 IN | SYSTOLIC BLOOD PRESSURE: 126 MMHG | OXYGEN SATURATION: 98 % | HEART RATE: 81 BPM | DIASTOLIC BLOOD PRESSURE: 90 MMHG | WEIGHT: 232 LBS

## 2018-03-05 DIAGNOSIS — N28.9 RENAL INSUFFICIENCY: ICD-10-CM

## 2018-03-05 DIAGNOSIS — F41.9 ANXIETY: ICD-10-CM

## 2018-03-05 DIAGNOSIS — E61.1 IRON DEFICIENCY: ICD-10-CM

## 2018-03-05 DIAGNOSIS — R05.9 COUGH: ICD-10-CM

## 2018-03-05 DIAGNOSIS — F34.1 DYSTHYMIA: ICD-10-CM

## 2018-03-05 DIAGNOSIS — R42 DIZZINESS: Primary | ICD-10-CM

## 2018-03-05 LAB
ALBUMIN SERPL-MCNC: 4.5 G/DL (ref 3.2–4.8)
ALBUMIN/GLOB SERPL: 1.5 G/DL (ref 1.5–2.5)
ALP SERPL-CCNC: 111 U/L (ref 25–100)
ALT SERPL-CCNC: 21 U/L (ref 7–40)
AST SERPL-CCNC: 27 U/L (ref 0–33)
BASOPHILS # BLD AUTO: 0.03 10*3/MM3 (ref 0–0.2)
BASOPHILS NFR BLD AUTO: 0.3 % (ref 0–1)
BILIRUB SERPL-MCNC: 0.4 MG/DL (ref 0.3–1.2)
BUN SERPL-MCNC: 23 MG/DL (ref 9–23)
BUN/CREAT SERPL: 20.9 (ref 7–25)
CALCIUM SERPL-MCNC: 10 MG/DL (ref 8.7–10.4)
CHLORIDE SERPL-SCNC: 100 MMOL/L (ref 99–109)
CO2 SERPL-SCNC: 28 MMOL/L (ref 20–31)
CREAT SERPL-MCNC: 1.1 MG/DL (ref 0.6–1.3)
EOSINOPHIL # BLD AUTO: 0.25 10*3/MM3 (ref 0–0.3)
EOSINOPHIL NFR BLD AUTO: 2.7 % (ref 0–3)
ERYTHROCYTE [DISTWIDTH] IN BLOOD BY AUTOMATED COUNT: 17.9 % (ref 11.3–14.5)
GFR SERPLBLD CREATININE-BSD FMLA CKD-EPI: 51 ML/MIN/1.73
GFR SERPLBLD CREATININE-BSD FMLA CKD-EPI: 61 ML/MIN/1.73
GLOBULIN SER CALC-MCNC: 3 GM/DL
GLUCOSE SERPL-MCNC: 93 MG/DL (ref 70–100)
HCT VFR BLD AUTO: 43.1 % (ref 34.5–44)
HGB BLD-MCNC: 13.3 G/DL (ref 11.5–15.5)
IMM GRANULOCYTES # BLD: 0.02 10*3/MM3 (ref 0–0.03)
IMM GRANULOCYTES NFR BLD: 0.2 % (ref 0–0.6)
LYMPHOCYTES # BLD AUTO: 3.32 10*3/MM3 (ref 0.6–4.8)
LYMPHOCYTES NFR BLD AUTO: 35.9 % (ref 24–44)
MCH RBC QN AUTO: 25.4 PG (ref 27–31)
MCHC RBC AUTO-ENTMCNC: 30.9 G/DL (ref 32–36)
MCV RBC AUTO: 82.4 FL (ref 80–99)
MONOCYTES # BLD AUTO: 0.58 10*3/MM3 (ref 0–1)
MONOCYTES NFR BLD AUTO: 6.3 % (ref 0–12)
NEUTROPHILS # BLD AUTO: 5.06 10*3/MM3 (ref 1.5–8.3)
NEUTROPHILS NFR BLD AUTO: 54.6 % (ref 41–71)
PLATELET # BLD AUTO: 367 10*3/MM3 (ref 150–450)
POTASSIUM SERPL-SCNC: 5.1 MMOL/L (ref 3.5–5.5)
PROT SERPL-MCNC: 7.5 G/DL (ref 5.7–8.2)
RBC # BLD AUTO: 5.23 10*6/MM3 (ref 3.89–5.14)
SODIUM SERPL-SCNC: 139 MMOL/L (ref 132–146)
WBC # BLD AUTO: 9.26 10*3/MM3 (ref 3.5–10.8)

## 2018-03-05 PROCEDURE — 99214 OFFICE O/P EST MOD 30 MIN: CPT | Performed by: FAMILY MEDICINE

## 2018-03-05 RX ORDER — VENLAFAXINE HYDROCHLORIDE 75 MG/1
75 CAPSULE, EXTENDED RELEASE ORAL DAILY
Qty: 30 CAPSULE | Refills: 2 | Status: SHIPPED | OUTPATIENT
Start: 2018-03-05 | End: 2018-04-23 | Stop reason: SDUPTHER

## 2018-03-05 NOTE — PROGRESS NOTES
Assessment/Plan     Problem List Items Addressed This Visit        Other    Depression    Relevant Medications    venlafaxine XR (EFFEXOR XR) 75 MG 24 hr capsule    Anxiety    Relevant Medications    venlafaxine XR (EFFEXOR XR) 75 MG 24 hr capsule      Other Visit Diagnoses     Dizziness    -  Primary    Cough               Follow up: Return if symptoms worsen or fail to improve.     DISCUSSION  I suspect the dizziness is related to Cymbalta withdrawal.  It started around the same time that she stop taking the Cymbalta.  Insurance would not cover and was waiting on prior authorization.  We will go ahead and changed to Effexor since that appears to be covered.  Since she is not been on the medication for a couple weeks, start Effexor XR 75 mg daily and call if not improving.  Checking CBC and CMP today for follow-up anyway so we will make sure that is all okay.    Cough.  Completed Z-Justin.  It is improving.  Lungs are clear with no evidence of secondary infection at this time.  Call if not continuing to improve.    MEDICATIONS PRESCRIBED  Requested Prescriptions     Signed Prescriptions Disp Refills   • venlafaxine XR (EFFEXOR XR) 75 MG 24 hr capsule 30 capsule 2     Sig: Take 1 capsule by mouth Daily.              -------------------------------------------    Subjective     Chief Complaint   Patient presents with   • Dizziness   • Cough     PRODUCTIVE       Dizziness   This is a new problem. The current episode started yesterday (worse since yesterday. started with the cough). The problem occurs constantly (spining feeling). Associated symptoms include congestion, coughing, a fever (1 week ago), nausea (in am) and vertigo (head feels like spinning). Pertinent negatives include no sore throat or vomiting. Associated symptoms comments: No syncope, no falls. The symptoms are aggravated by bending and twisting (does not resolve if still). She has tried nothing for the symptoms. The treatment provided no relief.  "  Cough   This is a new (started day after the last visit. Took zpak and cough med. ) problem. The current episode started in the past 7 days. The problem has been gradually improving. The cough is productive of sputum (yellow in am and white). Associated symptoms include a fever (1 week ago) and nasal congestion (in am). Pertinent negatives include no sore throat. Associated symptoms comments: Cough causes decreased sleep. Occ nausea in am. She has tried prescription cough suppressant (zpak) for the symptoms. The treatment provided no relief. Her past medical history is significant for asthma (slight).       Cough med , no help.     Had been on Cymbalta more than 1 yr and stopped it couple weeks ago after last visit  Needs pa for that  Had been on lexapro, prozac in the past.       Past Medical History,Medications, Allergies, and social history was reviewed.    Review of Systems   Constitutional: Positive for fever (1 week ago).   HENT: Positive for congestion. Negative for sore throat.    Respiratory: Positive for cough.    Gastrointestinal: Positive for nausea (in am). Negative for vomiting.   Neurological: Positive for dizziness and vertigo (head feels like spinning).       Objective     Vitals:    03/05/18 1247   BP: 126/90   Pulse: 81   Resp: 16   Temp: 96.8 °F (36 °C)   TempSrc: Temporal Artery    SpO2: 98%   Weight: 105 kg (232 lb)   Height: 151.1 cm (59.49\")        Physical Exam   Constitutional: She is oriented to person, place, and time. She appears well-developed and well-nourished.   HENT:   Head: Normocephalic and atraumatic.   Right Ear: Hearing, tympanic membrane, external ear and ear canal normal.   Left Ear: Hearing, tympanic membrane, external ear and ear canal normal.   Mouth/Throat: Oropharynx is clear and moist.   Eyes: Conjunctivae and EOM are normal. Pupils are equal, round, and reactive to light.   Neck: Normal range of motion. Neck supple. No thyromegaly present.   Cardiovascular: Normal " rate, regular rhythm and normal heart sounds.  Exam reveals no gallop and no friction rub.    No murmur heard.  Pulmonary/Chest: Effort normal and breath sounds normal. No respiratory distress. She has no wheezes. She has no rales.   Musculoskeletal: She exhibits no edema.   Neurological: She is alert and oriented to person, place, and time. She displays no tremor. No cranial nerve deficit. She exhibits normal muscle tone. Coordination and gait normal.   Reflex Scores:       Patellar reflexes are 2+ on the right side and 2+ on the left side.  Skin: Skin is warm and dry.   Psychiatric: She has a normal mood and affect. Her behavior is normal.   Nursing note and vitals reviewed.              Masoud Martin MD

## 2018-03-09 ENCOUNTER — TELEPHONE (OUTPATIENT)
Dept: FAMILY MEDICINE CLINIC | Facility: CLINIC | Age: 63
End: 2018-03-09

## 2018-03-09 NOTE — TELEPHONE ENCOUNTER
Please call and confirm if she took Z-Justin on 2/27/2018 and if so, did she improve any while on that?  Also, has she ever used amoxicillin or Augmentin in the past?  I see that she is allergic to cephalexin but if she allergic to penicillin?  Finally, did the dizziness go away with the new medication.?

## 2018-03-09 NOTE — TELEPHONE ENCOUNTER
----- Message from Mel Lindsey sent at 3/9/2018 11:13 AM EST -----  Contact: LONG;PT CALLED  PT IS COUGHING MORE WITH A LOT OF HEAD AND NASAL CONGESTION-COULD SHE  HAVE SOMETHING SENT IN TO CLEAR IT UP    [facility] [place] [facility]  AND  REFILL ON traZODone (DESYREL) 50 MG tablet    [phone]  MESSAGE OK

## 2018-03-12 NOTE — TELEPHONE ENCOUNTER
PT WENT TO [facility] YESTERDAY AND SHE HAS BRONCHITIS AND RT EAR INFECTION. PT WAS GIVEN DOXYCYCLINE FOR THIS. PT WAS INFORMED THAT IF NOT BETTER OR GETTING WORSE TO GIVE US CALL. PT VERBALIZED UNDERSTANDING.

## 2018-03-15 ENCOUNTER — TELEPHONE (OUTPATIENT)
Dept: FAMILY MEDICINE CLINIC | Facility: CLINIC | Age: 63
End: 2018-03-15

## 2018-03-15 NOTE — TELEPHONE ENCOUNTER
----- Message from Mel Lindsey sent at 3/15/2018  2:14 PM EDT -----  Contact: LONG;PT CALLED  PT STILL HAS CONGESTION AND COUGING AND STOPPED UP EARS AND FEELS LIKE IT IS STARTING ALL OVER AGAIN-SHE WAS TO CALL BACK IF NOT BETTER-SHE  IS CONTINUING THE MEDS    [facility] [place] [place]    [phone]  MESSAGE OK

## 2018-03-16 ENCOUNTER — OFFICE VISIT (OUTPATIENT)
Dept: FAMILY MEDICINE CLINIC | Facility: CLINIC | Age: 63
End: 2018-03-16

## 2018-03-16 VITALS
HEART RATE: 86 BPM | TEMPERATURE: 98.2 F | WEIGHT: 234 LBS | BODY MASS INDEX: 47.17 KG/M2 | SYSTOLIC BLOOD PRESSURE: 134 MMHG | HEIGHT: 59 IN | RESPIRATION RATE: 18 BRPM | OXYGEN SATURATION: 99 % | DIASTOLIC BLOOD PRESSURE: 92 MMHG

## 2018-03-16 DIAGNOSIS — R50.9 FEVER, UNSPECIFIED FEVER CAUSE: ICD-10-CM

## 2018-03-16 DIAGNOSIS — J06.9 PROTRACTED URI: Primary | ICD-10-CM

## 2018-03-16 LAB
EXPIRATION DATE: NORMAL
FLUAV AG NPH QL: NORMAL
FLUBV AG NPH QL: NORMAL
INTERNAL CONTROL: NORMAL
Lab: NORMAL

## 2018-03-16 PROCEDURE — 87804 INFLUENZA ASSAY W/OPTIC: CPT | Performed by: FAMILY MEDICINE

## 2018-03-16 PROCEDURE — 99213 OFFICE O/P EST LOW 20 MIN: CPT | Performed by: FAMILY MEDICINE

## 2018-03-16 RX ORDER — PREDNISONE 10 MG/1
TABLET ORAL
Qty: 20 TABLET | Refills: 0 | Status: SHIPPED | OUTPATIENT
Start: 2018-03-16 | End: 2018-05-08

## 2018-03-16 NOTE — PROGRESS NOTES
Assessment/Plan       Problems Addressed this Visit     None      Visit Diagnoses     Protracted URI    -  Primary    Relevant Medications    predniSONE (DELTASONE) 10 MG tablet    HYDROcodone-homatropine (HYCODAN) 5-1.5 MG/5ML syrup    Fever, unspecified fever cause        Relevant Orders    POCT Influenza A/B (Completed)            Follow up: Return if symptoms worsen or fail to improve.     DISCUSSION  Protracted upper respiratory infection.  Recommend continue and finish doxycycline.  At prednisone taper as noted.  Side effects explained.    Hycodan as needed for cough.  Side effects explained. Addiction potential explained.      MEDICATIONS PRESCRIBED  Requested Prescriptions     Signed Prescriptions Disp Refills   • predniSONE (DELTASONE) 10 MG tablet 20 tablet 0     Si po x 2 days then 3 po daily x 2 days then 2 po daily x 2 days then 1 po daily x 2 days then stop.   • HYDROcodone-homatropine (HYCODAN) 5-1.5 MG/5ML syrup 180 mL 0     Sig: Take 5 mL by mouth Every 6 (Six) Hours As Needed for Cough.            Mike dated on 3/16/2018   was reviewed and appropriate.        -------------------------------------------    Subjective     Chief Complaint   Patient presents with   • Cough   • Dizziness   • Nasal Congestion   • Chills         Cough   This is a recurrent problem. The current episode started 1 to 4 weeks ago (3 weeks and getting worse). The problem has been gradually worsening. The problem occurs hourly. The cough is productive of sputum (white, gags some). Associated symptoms include chills, ear pain (some), a fever (off and on and none x 4-5 days), nasal congestion, postnasal drip, shortness of breath (at times) and wheezing (at times at night). Pertinent negatives include no rhinorrhea. She has tried prescription cough suppressant (Doxycyline and teassalon perles. [facility] 5 days ago) for the symptoms. The treatment provided no relief.       Current meds, doxy and cough pills not  "helping        Past Medical History,Medications, Allergies, and social history was reviewed.      Review of Systems   Constitutional: Positive for chills and fever (off and on and none x 4-5 days).   HENT: Positive for ear pain (some) and postnasal drip. Negative for rhinorrhea.    Respiratory: Positive for cough, shortness of breath (at times) and wheezing (at times at night).    Neurological: Positive for dizziness.       Objective     Vitals:    03/16/18 1115   BP: 134/92   Pulse: 86   Resp: 18   Temp: 98.2 °F (36.8 °C)   TempSrc: Temporal Artery    SpO2: 99%   Weight: 106 kg (234 lb)   Height: 151.1 cm (59.49\")          Physical Exam   Constitutional: She is oriented to person, place, and time. She appears well-developed and well-nourished. No distress.   HENT:   Head: Normocephalic.   Right Ear: External ear normal. Tympanic membrane is retracted. Tympanic membrane is not erythematous.   Left Ear: External ear normal. Tympanic membrane is retracted. Tympanic membrane is not erythematous.   Nose: Right sinus exhibits no maxillary sinus tenderness. Left sinus exhibits no maxillary sinus tenderness.   Mouth/Throat: No oropharyngeal exudate.   Eyes: Conjunctivae and EOM are normal. Pupils are equal, round, and reactive to light.   Cardiovascular: Normal rate, regular rhythm and normal heart sounds.  Exam reveals no gallop and no friction rub.    No murmur heard.  Pulmonary/Chest: Effort normal. No respiratory distress. She has no wheezes. She has rhonchi. She has no rales.   Neurological: She is alert and oriented to person, place, and time.   Skin: Skin is warm. She is not diaphoretic.   Psychiatric: She has a normal mood and affect. Her behavior is normal.   Nursing note and vitals reviewed.              Masoud Martin MD    "

## 2018-03-22 ENCOUNTER — TELEPHONE (OUTPATIENT)
Dept: FAMILY MEDICINE CLINIC | Facility: CLINIC | Age: 63
End: 2018-03-22

## 2018-03-22 RX ORDER — OMEPRAZOLE 40 MG/1
40 CAPSULE, DELAYED RELEASE ORAL DAILY
Qty: 30 CAPSULE | Refills: 1 | Status: SHIPPED | OUTPATIENT
Start: 2018-03-22 | End: 2018-03-22

## 2018-03-22 NOTE — TELEPHONE ENCOUNTER
Please call, sending omeprazole 40 mg daily to pharmacy. Take at least 30 min before eating once per day.    Will not know if covered til the pharmacy runs the prescription. Let us know if not covered.

## 2018-03-22 NOTE — TELEPHONE ENCOUNTER
Per pharm this one is not covered either.  Looking at formulary Prevacid 24hr (OTC) 15mg is covered and Nexium (OTC) is covered.

## 2018-03-22 NOTE — TELEPHONE ENCOUNTER
----- Message from Mel Leavitt sent at 3/22/2018  2:42 PM EDT -----  Contact: [facility]  PATIENT NEEDS SOMETHING FOR HEARTBURN AND INDIGESTION, IT WILL NEED TO BE SOMETHING THE INS. WILL COVER.   SHE HAS ALREADY OTC :  ZANTAC 150 MG  RANITIDINE 150 MG  AND NEITHER OF THESE WORKED.     PHARMACY [facility]

## 2018-03-22 NOTE — TELEPHONE ENCOUNTER
Informed pt and she say's, she already knows they wont cover this because when she first switched to this insurance they would not cover the 20mg of this med either.   Confirmed this with pharm and it is NOT covered.

## 2018-03-28 ENCOUNTER — TELEPHONE (OUTPATIENT)
Dept: FAMILY MEDICINE CLINIC | Facility: CLINIC | Age: 63
End: 2018-03-28

## 2018-03-28 NOTE — TELEPHONE ENCOUNTER
----- Message from Mel Leavitt sent at 3/28/2018  8:41 AM EDT -----  Contact: [facility]  PLEASE CALL PATIENT ABOUT OMEPRAZOLE. ANY OF THOSE IN THAT FAMILY WILL NEED A PRIOR AUTH. THE PROBLEM IS SHE NEEDS SOMETHING CALLED IN THAT HER INS WILL PAY FOR.      [place]    [phone]

## 2018-04-10 ENCOUNTER — TELEPHONE (OUTPATIENT)
Dept: FAMILY MEDICINE CLINIC | Facility: CLINIC | Age: 63
End: 2018-04-10

## 2018-04-10 DIAGNOSIS — F51.01 PRIMARY INSOMNIA: ICD-10-CM

## 2018-04-10 RX ORDER — TRAZODONE HYDROCHLORIDE 50 MG/1
50-100 TABLET ORAL
Qty: 30 TABLET | Refills: 2 | Status: SHIPPED | OUTPATIENT
Start: 2018-04-10 | End: 2019-01-08 | Stop reason: SDUPTHER

## 2018-04-10 NOTE — TELEPHONE ENCOUNTER
----- Message from Makenna Reardon sent at 4/10/2018 10:14 AM EDT -----  Contact: [facility]; MED REFILL   MED REFILL     traZODone (DESYREL) 50 MG tablet         PHARMACY   [facility]     CALL BACK   [phone]

## 2018-04-13 ENCOUNTER — TELEPHONE (OUTPATIENT)
Dept: FAMILY MEDICINE CLINIC | Facility: CLINIC | Age: 63
End: 2018-04-13

## 2018-04-13 NOTE — TELEPHONE ENCOUNTER
----- Message from Makenna Reardon sent at 4/12/2018  4:37 PM EDT -----  Contact: [facility] PT / MED QUESTION   PT INSURANCE WILL NOT PAY FOR DULoxetine (CYMBALTA) 60 MG capsule SHE WANTED TO KNOW IF THERE WAS SOME THAT COULD BE DONE DUE TO SHE IS IN PAIN. PLEASE ADVISE      CALL BACK   [phone]

## 2018-04-18 NOTE — TELEPHONE ENCOUNTER
Please call:  1. Was she aware that I was out of the office? Sorry for the delay if not.     2. Is she taking the Effexor still? We may need to increase that dose a little to see if helps.     Let me know.     bds

## 2018-04-19 ENCOUNTER — DOCUMENTATION (OUTPATIENT)
Dept: BARIATRICS/WEIGHT MGMT | Facility: CLINIC | Age: 63
End: 2018-04-19

## 2018-04-19 NOTE — TELEPHONE ENCOUNTER
Please call.  If she has enough Effexor, have her take 2 daily to equal 150 mg for the next week and see if that helps any.  Important that she not run out of those and let me know if she does not have enough for that and can change the prescription to 150 mg daily.

## 2018-04-23 ENCOUNTER — TELEPHONE (OUTPATIENT)
Dept: FAMILY MEDICINE CLINIC | Facility: CLINIC | Age: 63
End: 2018-04-23

## 2018-04-23 DIAGNOSIS — F34.1 DYSTHYMIA: ICD-10-CM

## 2018-04-23 DIAGNOSIS — F41.9 ANXIETY: ICD-10-CM

## 2018-04-23 RX ORDER — VENLAFAXINE HYDROCHLORIDE 150 MG/1
150 CAPSULE, EXTENDED RELEASE ORAL DAILY
Qty: 30 CAPSULE | Refills: 2 | Status: SHIPPED | OUTPATIENT
Start: 2018-04-23 | End: 2018-06-29 | Stop reason: SDUPTHER

## 2018-04-23 NOTE — TELEPHONE ENCOUNTER
Please call.  I sent in a new prescription for Effexor  mg 1 per day.  I have increased the dose so  she only has to take 1 per day.  Let us know if this is not helping after few weeks.

## 2018-04-23 NOTE — TELEPHONE ENCOUNTER
----- Message from Hanny Ramos sent at 4/23/2018  4:29 PM EDT -----  Contact: [facility] / PT CALL  PT CALLED AND STATED THAT THE FOLLOWING WAS CHANGED FROM 75MG TO 150MG THEREFORE SHE IS GOING TO RUN OUT.  IT WAS CHANGED VERBALLY OVER THE PHONE BUT NOT WITH THE PHARMACY.  PT ONLY HAS ENOUGH TIL Thursday.    venlafaxine XR (EFFEXOR XR) 75 MG 24 hr capsule [[id number]]   Order Details   Dose: 75 mg Route: Oral Frequency: Daily  Dispense Quantity:  30 capsule Refills:  2 Fills remaining:  --         Sig: Take 1 capsule by mouth Daily.          [facility]

## 2018-05-08 ENCOUNTER — OFFICE VISIT (OUTPATIENT)
Dept: BARIATRICS/WEIGHT MGMT | Facility: CLINIC | Age: 63
End: 2018-05-08

## 2018-05-08 VITALS
HEIGHT: 60 IN | TEMPERATURE: 97.8 F | DIASTOLIC BLOOD PRESSURE: 72 MMHG | RESPIRATION RATE: 18 BRPM | OXYGEN SATURATION: 99 % | HEART RATE: 84 BPM | SYSTOLIC BLOOD PRESSURE: 110 MMHG | WEIGHT: 236 LBS | BODY MASS INDEX: 46.33 KG/M2

## 2018-05-08 DIAGNOSIS — R01.1 HEART MURMUR: ICD-10-CM

## 2018-05-08 DIAGNOSIS — M25.50 ARTHRALGIA, UNSPECIFIED JOINT: ICD-10-CM

## 2018-05-08 DIAGNOSIS — R53.83 FATIGUE, UNSPECIFIED TYPE: ICD-10-CM

## 2018-05-08 DIAGNOSIS — G47.33 OBSTRUCTIVE SLEEP APNEA SYNDROME: ICD-10-CM

## 2018-05-08 DIAGNOSIS — F41.9 ANXIETY: ICD-10-CM

## 2018-05-08 DIAGNOSIS — K21.9 GASTROESOPHAGEAL REFLUX DISEASE, ESOPHAGITIS PRESENCE NOT SPECIFIED: Primary | ICD-10-CM

## 2018-05-08 DIAGNOSIS — F34.1 DYSTHYMIA: ICD-10-CM

## 2018-05-08 DIAGNOSIS — E78.00 PURE HYPERCHOLESTEROLEMIA: ICD-10-CM

## 2018-05-08 DIAGNOSIS — N28.9 KIDNEY FUNCTION ABNORMAL: ICD-10-CM

## 2018-05-08 DIAGNOSIS — R10.9 ABDOMINAL PAIN, UNSPECIFIED ABDOMINAL LOCATION: ICD-10-CM

## 2018-05-08 DIAGNOSIS — E03.9 ACQUIRED HYPOTHYROIDISM: ICD-10-CM

## 2018-05-08 DIAGNOSIS — R11.0 NAUSEA: ICD-10-CM

## 2018-05-08 DIAGNOSIS — E55.9 VITAMIN D DEFICIENCY: ICD-10-CM

## 2018-05-08 DIAGNOSIS — I10 ESSENTIAL HYPERTENSION: ICD-10-CM

## 2018-05-08 DIAGNOSIS — E61.1 IRON DEFICIENCY: ICD-10-CM

## 2018-05-08 DIAGNOSIS — R73.9 HYPERGLYCEMIA: ICD-10-CM

## 2018-05-08 DIAGNOSIS — J45.909 ASTHMA, UNSPECIFIED ASTHMA SEVERITY, UNSPECIFIED WHETHER COMPLICATED, UNSPECIFIED WHETHER PERSISTENT: ICD-10-CM

## 2018-05-08 DIAGNOSIS — L02.92 BOIL: ICD-10-CM

## 2018-05-08 DIAGNOSIS — J30.9 ALLERGIC RHINITIS, UNSPECIFIED CHRONICITY, UNSPECIFIED SEASONALITY, UNSPECIFIED TRIGGER: ICD-10-CM

## 2018-05-08 PROCEDURE — 99214 OFFICE O/P EST MOD 30 MIN: CPT | Performed by: PHYSICIAN ASSISTANT

## 2018-05-08 RX ORDER — DOCUSATE SODIUM 250 MG
250 CAPSULE ORAL DAILY
COMMUNITY
End: 2018-11-28

## 2018-05-10 ENCOUNTER — DOCUMENTATION (OUTPATIENT)
Dept: BARIATRICS/WEIGHT MGMT | Facility: CLINIC | Age: 63
End: 2018-05-10

## 2018-05-10 NOTE — PROGRESS NOTES
"Weight Loss Surgery  Presurgical Nutrition Assessment     Connie Lu  05/10/2018  [id number]  [id number]  1955  female    Surgery desired: Sleeve Gastrectomy    Ht 151.1 cm (59.5\"); Wt 107 kg (236#); BMI 46.9   Past Medical History:   Diagnosis Date   • Anxiety    • Asthma     worse with heat, has inhaler for rare need   • Attention deficit disorder    • Boil     states it was not MRSA, once, many years ago   • Breast cancer 2010    estrogen driven. S/p left mastectomy and right reduction. No requirements for radiation or chemo.     • Carpal tunnel syndrome    • Constipation     on linzess   • Depression    • Diverticulosis     noted on colonoscopy   • GERD (gastroesophageal reflux disease)     controlled with esomeprazole.  EGD scheduled with Dr. Mustafa for evaluation of reflux, concern for HH.     • Heart murmur     Has seen cardiologist 2017, had preop workup for left TKR   • History of blood transfusion     as an infant, unknown season.    • Hypertension    • Hypothyroidism    • Insomnia    • Iron deficiency     on iron supplements   • Joint pain     effexor helps. No NSAIDS, no injections.    • Kidney function abnormal     \"little low\" on previous labs, advised increased hydration   • Lower back pain    • RADHA (obstructive sleep apnea)     Non CPAP compliant   • Polyp of sigmoid colon    • Prediabetes      Past Surgical History:   Procedure Laterality Date   • BREAST SURGERY Right 2010    Reduction   • COLONOSCOPY  2016    diverticulosis, h/p benign polyps   • MASTECTOMY WITH IMMEDIATE RECONSTRUCTION Left 2010   • REPLACEMENT TOTAL KNEE Left 08/2017    Dr Nova   • RETINAL DETACHMENT REPAIR Left 07/2017   • TONSILLECTOMY AND ADENOIDECTOMY  1960, 1962     Allergies   Allergen Reactions   • Cephalexin Hives and Itching     Tolerates PCN fine, tolerates other cephalosporins well.        Current Outpatient Prescriptions:   •  docusate sodium (COLACE) 250 MG capsule, Take 250 mg by mouth Daily., Disp: , " Rfl:   •  esomeprazole (NEXIUM 24HR) 20 MG capsule, Take 1 capsule by mouth Every Morning Before Breakfast., Disp: 30 capsule, Rfl: 1  •  ferrous sulfate (IRON SUPPLEMENT) 325 (65 FE) MG tablet, Take 1 tablet by mouth Daily With Breakfast., Disp: 30 tablet, Rfl: 2  •  levothyroxine (SYNTHROID, LEVOTHROID) 137 MCG tablet, TAKE ONE TABLET BY MOUTH EVERY DAY, Disp: 30 tablet, Rfl: 3  •  linaclotide (LINZESS) 290 MCG capsule capsule, Take 72 mcg by mouth Every Morning Before Breakfast., Disp: , Rfl:   •  losartan (COZAAR) 100 MG tablet, Take 1 tablet by mouth Daily., Disp: 30 tablet, Rfl: 5  •  traZODone (DESYREL) 50 MG tablet, Take 1-2 tablets by mouth every night at bedtime., Disp: 30 tablet, Rfl: 2  •  triamterene-hydrochlorothiazide (MAXZIDE-25) 37.5-25 MG per tablet, TAKE ONE TABLET BY MOUTH EVERY DAY, Disp: 30 tablet, Rfl: 2  •  venlafaxine XR (EFFEXOR-XR) 150 MG 24 hr capsule, Take 1 capsule by mouth Daily., Disp: 30 capsule, Rfl: 2      Nutrition Assessment    Estimated energy needs:  1550 kcal    Estimated calories for weight loss:  1000 kcal    IBW (Pounds):  125 #        Excess body weight (Pounds):  110 #    Consulted /c pt on 05/08/2018     Nutrition Recall  24 Hour recall: (B) (L) (D) -  Reviewed and discussed with patient.  Meals are well balanced in re to adequate protein and healthy carbs.  However, she drinks coffee /c sugar, snacks on DQ ice cream cone & 2 cups strawberry jello salad, eats dinner rolls in the evening at meal and as HS snack.       Exercise  never - states she is unable d/t knee pain      Education    Provided information packet re:  Sleeve Gastrectomy  1. Reviewed guidelines for higher protein, limited carbohydrate diet to promote weight loss.  Encouraged patient to incorporate these principles of healthy eating from now until approximately 2 weeks prior to bariatric surgery date, when an even lower carbohydrate “liver-shrinking” regimen will be followed. (Information sheet re pre-op  diet given).  Explained that after recovery from surgery this diet will again be followed to ensure further loss and for weight maintenance.    2. Encouraged patient to choose an acceptable protein supplement powder or shake for post-surgery liquid diet.  Provided product guidelines and examples.    3. Explained importance of goal setting to help in changing eating behaviors that are not conducive to weight loss.  Targeted several on a worksheet which also included spaces for patient to work on issues specific to them.  4. Provided follow-up options for support, including contact information for dietitians here, if desired.  Web-based support information and apps for smart phones and computers given.  Noted that monthly support group is offered at this clinic, and that support is associated with successful weight loss.    Recommend that team proceed with surgery and follow per protocol.      Nutrition Goals   Dietary Guidelines per information packet as described above  Protein goal:  grams per day   Carbohydrate goal:  100-140 grams per day  Eliminate soda, sweet tea, etc.     Exercise Goals  Continue current exercise routine   Add 15-30 minutes of activity per day as tolerated      Jillian Milner RD  05/10/2018  4:08 PM

## 2018-05-11 ENCOUNTER — HOSPITAL ENCOUNTER (OUTPATIENT)
Dept: ULTRASOUND IMAGING | Facility: HOSPITAL | Age: 63
Discharge: HOME | End: 2018-05-11
Admitting: PHYSICIAN ASSISTANT

## 2018-05-11 ENCOUNTER — LAB (OUTPATIENT)
Dept: LAB | Facility: HOSPITAL | Age: 63
End: 2018-05-11

## 2018-05-11 ENCOUNTER — TRANSCRIBE ORDERS (OUTPATIENT)
Dept: LAB | Facility: HOSPITAL | Age: 63
End: 2018-05-11

## 2018-05-11 DIAGNOSIS — E61.1 IRON DEFICIENCY: ICD-10-CM

## 2018-05-11 DIAGNOSIS — R10.9 ABDOMINAL PAIN, UNSPECIFIED ABDOMINAL LOCATION: ICD-10-CM

## 2018-05-11 DIAGNOSIS — I10 ESSENTIAL HYPERTENSION: Primary | ICD-10-CM

## 2018-05-11 DIAGNOSIS — N28.9 KIDNEY FUNCTION ABNORMAL: ICD-10-CM

## 2018-05-11 DIAGNOSIS — R11.0 NAUSEA: ICD-10-CM

## 2018-05-11 DIAGNOSIS — I10 ESSENTIAL HYPERTENSION: ICD-10-CM

## 2018-05-11 LAB
ALBUMIN SERPL-MCNC: 4.2 G/DL (ref 3.2–4.8)
ALBUMIN/GLOB SERPL: 1.6 G/DL (ref 1.5–2.5)
ALP SERPL-CCNC: 110 U/L (ref 25–100)
ALT SERPL W P-5'-P-CCNC: 24 U/L (ref 7–40)
ANION GAP SERPL CALCULATED.3IONS-SCNC: 9 MMOL/L (ref 3–11)
ARTICHOKE IGE QN: 116 MG/DL (ref 0–130)
AST SERPL-CCNC: 25 U/L (ref 0–33)
BASOPHILS # BLD AUTO: 0.02 10*3/MM3 (ref 0–0.2)
BASOPHILS NFR BLD AUTO: 0.2 % (ref 0–1)
BILIRUB SERPL-MCNC: 0.4 MG/DL (ref 0.3–1.2)
BUN BLD-MCNC: 22 MG/DL (ref 9–23)
BUN/CREAT SERPL: 27.5 (ref 7–25)
CALCIUM SPEC-SCNC: 9.3 MG/DL (ref 8.7–10.4)
CHLORIDE SERPL-SCNC: 100 MMOL/L (ref 99–109)
CHOLEST SERPL-MCNC: 194 MG/DL (ref 0–200)
CO2 SERPL-SCNC: 30 MMOL/L (ref 20–31)
CREAT BLD-MCNC: 0.8 MG/DL (ref 0.6–1.3)
DEPRECATED RDW RBC AUTO: 55.7 FL (ref 37–54)
EOSINOPHIL # BLD AUTO: 0.15 10*3/MM3 (ref 0–0.3)
EOSINOPHIL NFR BLD AUTO: 1.6 % (ref 0–3)
ERYTHROCYTE [DISTWIDTH] IN BLOOD BY AUTOMATED COUNT: 17.9 % (ref 11.3–14.5)
GFR SERPL CREATININE-BSD FRML MDRD: 73 ML/MIN/1.73
GLOBULIN UR ELPH-MCNC: 2.7 GM/DL
GLUCOSE BLD-MCNC: 106 MG/DL (ref 70–100)
HBA1C MFR BLD: 6.1 % (ref 4.8–5.6)
HCT VFR BLD AUTO: 43.2 % (ref 34.5–44)
HDLC SERPL-MCNC: 73 MG/DL (ref 40–60)
HGB BLD-MCNC: 13.6 G/DL (ref 11.5–15.5)
IMM GRANULOCYTES # BLD: 0.03 10*3/MM3 (ref 0–0.03)
IMM GRANULOCYTES NFR BLD: 0.3 % (ref 0–0.6)
LYMPHOCYTES # BLD AUTO: 2.54 10*3/MM3 (ref 0.6–4.8)
LYMPHOCYTES NFR BLD AUTO: 26.6 % (ref 24–44)
MCH RBC QN AUTO: 26.9 PG (ref 27–31)
MCHC RBC AUTO-ENTMCNC: 31.5 G/DL (ref 32–36)
MCV RBC AUTO: 85.5 FL (ref 80–99)
MONOCYTES # BLD AUTO: 0.55 10*3/MM3 (ref 0–1)
MONOCYTES NFR BLD AUTO: 5.8 % (ref 0–12)
NEUTROPHILS # BLD AUTO: 6.29 10*3/MM3 (ref 1.5–8.3)
NEUTROPHILS NFR BLD AUTO: 65.8 % (ref 41–71)
PLATELET # BLD AUTO: 308 10*3/MM3 (ref 150–450)
PMV BLD AUTO: 11 FL (ref 6–12)
POTASSIUM BLD-SCNC: 3.7 MMOL/L (ref 3.5–5.5)
PROT SERPL-MCNC: 6.9 G/DL (ref 5.7–8.2)
RBC # BLD AUTO: 5.05 10*6/MM3 (ref 3.89–5.14)
SODIUM BLD-SCNC: 139 MMOL/L (ref 132–146)
TRIGL SERPL-MCNC: 72 MG/DL (ref 0–150)
TSH SERPL DL<=0.05 MIU/L-ACNC: 0.89 MIU/ML (ref 0.35–5.35)
WBC NRBC COR # BLD: 9.55 10*3/MM3 (ref 3.5–10.8)

## 2018-05-11 PROCEDURE — 80053 COMPREHEN METABOLIC PANEL: CPT

## 2018-05-11 PROCEDURE — 76705 ECHO EXAM OF ABDOMEN: CPT

## 2018-05-11 PROCEDURE — 84443 ASSAY THYROID STIM HORMONE: CPT

## 2018-05-11 PROCEDURE — 80061 LIPID PANEL: CPT

## 2018-05-11 PROCEDURE — 36415 COLL VENOUS BLD VENIPUNCTURE: CPT

## 2018-05-11 PROCEDURE — 85025 COMPLETE CBC W/AUTO DIFF WBC: CPT

## 2018-05-11 PROCEDURE — 83036 HEMOGLOBIN GLYCOSYLATED A1C: CPT

## 2018-05-14 ENCOUNTER — OFFICE VISIT (OUTPATIENT)
Dept: FAMILY MEDICINE CLINIC | Facility: CLINIC | Age: 63
End: 2018-05-14

## 2018-05-14 VITALS
SYSTOLIC BLOOD PRESSURE: 130 MMHG | OXYGEN SATURATION: 98 % | WEIGHT: 239 LBS | RESPIRATION RATE: 14 BRPM | HEART RATE: 77 BPM | HEIGHT: 59 IN | DIASTOLIC BLOOD PRESSURE: 86 MMHG | BODY MASS INDEX: 48.18 KG/M2 | TEMPERATURE: 97.8 F

## 2018-05-14 DIAGNOSIS — IMO0001 CLASS 3 OBESITY DUE TO EXCESS CALORIES WITH BODY MASS INDEX (BMI) OF 45.0 TO 49.9 IN ADULT, UNSPECIFIED WHETHER SERIOUS COMORBIDITY PRESENT: Primary | ICD-10-CM

## 2018-05-14 DIAGNOSIS — Z01.818 PREOPERATIVE CLEARANCE: ICD-10-CM

## 2018-05-14 PROCEDURE — 99213 OFFICE O/P EST LOW 20 MIN: CPT | Performed by: FAMILY MEDICINE

## 2018-05-14 RX ORDER — ONDANSETRON 4 MG/1
4 TABLET, ORALLY DISINTEGRATING ORAL
COMMUNITY
Start: 2018-04-12 | End: 2020-05-28

## 2018-05-14 NOTE — PROGRESS NOTES
"  Assessment/Plan       Problems Addressed this Visit     None      Visit Diagnoses     Class 3 obesity due to excess calories with body mass index (BMI) of 45.0 to 49.9 in adult, unspecified whether serious comorbidity present    -  Primary            Follow up: Return in about 1 month (around 6/14/2018), or if symptoms worsen or fail to improve.     DISCUSSION  Obesity.  Here for visit #1 of 6 for bariatric approval.    Continue diet with decreasing carbohydrates and increasing protein.  Exercise.  Decrease overeating    Follow-up and recheck in one month.      MEDICATIONS PRESCRIBED  Requested Prescriptions      No prescriptions requested or ordered in this encounter          -------------------------------------------    Subjective     Chief Complaint   Patient presents with   • weight ck     1 st visit for Bariatric     VISIT 1 of 6    History of Present Illness    Obesity      Had been on wt watcher in the past    Not following diet now    Now: increase carbs  Sweets is the downfall    Does tend to overeat one meal per day      Exercise: not except walking and mowing yard    Here for eval for bariatric and needs to have 6 months follow    No chest pain no shortness of breath  Left ankle swells at times      History   Smoking Status   • Never Smoker   Smokeless Tobacco   • Never Used        Past Medical History,Medications, Allergies, and social history was reviewed.      Review of Systems   Constitutional: Negative.    HENT: Negative.    Respiratory: Negative.    Cardiovascular: Negative.    Gastrointestinal: Negative.    Psychiatric/Behavioral: Negative.        Objective     Vitals:    05/14/18 1001   BP: 130/86   Pulse: 77   Resp: 14   Temp: 97.8 °F (36.6 °C)   TempSrc: Temporal Artery    SpO2: 98%   Weight: 108 kg (239 lb)   Height: 151.1 cm (59.49\")          Physical Exam   Constitutional: She is oriented to person, place, and time. She appears well-developed and well-nourished.   Obese   HENT:   Head: " Normocephalic and atraumatic.   Right Ear: Hearing and external ear normal.   Left Ear: Hearing and external ear normal.   Mouth/Throat: Oropharynx is clear and moist.   Eyes: Conjunctivae and EOM are normal. Pupils are equal, round, and reactive to light.   Cardiovascular: Normal rate, regular rhythm and normal heart sounds.  Exam reveals no friction rub.    No murmur heard.  Pulmonary/Chest: Effort normal and breath sounds normal. No respiratory distress. She has no wheezes. She has no rales.   Neurological: She is alert and oriented to person, place, and time.   Skin: Skin is warm.   Psychiatric: She has a normal mood and affect. Her behavior is normal.   Nursing note and vitals reviewed.                Masoud Martin MD      5/15/2018   Contacted bariatric in the requested that we order the pulmonary function tests for preoperative clearance.    Masoud Martin MD

## 2018-05-15 ENCOUNTER — TELEPHONE (OUTPATIENT)
Dept: FAMILY MEDICINE CLINIC | Facility: CLINIC | Age: 63
End: 2018-05-15

## 2018-05-15 NOTE — TELEPHONE ENCOUNTER
----- Message from Emma Cid PA-C sent at 5/14/2018 11:17 AM EDT -----  Typically we have the PCP order PFTs.  Thank you!    ----- Message -----  From: Masoud Martin MD  Sent: 5/14/2018  10:32 AM  To: Emma Cid PA-C    Patient is here for 1st of 6 month visits for wt checks. She mentioned that we needed to schedule PFTs. I wanted to clarify if your office is scheduling that or do we need to schedule that.    Thanks    Masoud Martin MD

## 2018-05-15 NOTE — TELEPHONE ENCOUNTER
Please call patient and let her know that we will be ordering the pulmonary function tests and the should be contacting her to schedule that.

## 2018-05-16 ENCOUNTER — HOSPITAL ENCOUNTER (OUTPATIENT)
Dept: PULMONOLOGY | Facility: HOSPITAL | Age: 63
Discharge: HOME | End: 2018-05-16
Admitting: FAMILY MEDICINE

## 2018-05-16 DIAGNOSIS — Z01.818 PREOPERATIVE CLEARANCE: ICD-10-CM

## 2018-05-16 DIAGNOSIS — IMO0001 CLASS 3 OBESITY DUE TO EXCESS CALORIES WITH BODY MASS INDEX (BMI) OF 45.0 TO 49.9 IN ADULT, UNSPECIFIED WHETHER SERIOUS COMORBIDITY PRESENT: ICD-10-CM

## 2018-05-16 PROCEDURE — 94727 GAS DIL/WSHOT DETER LNG VOL: CPT

## 2018-05-16 PROCEDURE — 94729 DIFFUSING CAPACITY: CPT | Performed by: INTERNAL MEDICINE

## 2018-05-16 PROCEDURE — 94727 GAS DIL/WSHOT DETER LNG VOL: CPT | Performed by: INTERNAL MEDICINE

## 2018-05-16 PROCEDURE — 94729 DIFFUSING CAPACITY: CPT

## 2018-05-16 PROCEDURE — 94010 BREATHING CAPACITY TEST: CPT | Performed by: INTERNAL MEDICINE

## 2018-05-16 PROCEDURE — 94010 BREATHING CAPACITY TEST: CPT

## 2018-05-21 ENCOUNTER — LAB (OUTPATIENT)
Dept: LAB | Facility: HOSPITAL | Age: 63
End: 2018-05-21

## 2018-05-21 DIAGNOSIS — N28.9 KIDNEY FUNCTION ABNORMAL: ICD-10-CM

## 2018-05-21 DIAGNOSIS — E61.1 IRON DEFICIENCY: ICD-10-CM

## 2018-05-21 DIAGNOSIS — E78.00 PURE HYPERCHOLESTEROLEMIA: ICD-10-CM

## 2018-05-21 DIAGNOSIS — R73.9 HYPERGLYCEMIA: ICD-10-CM

## 2018-05-21 DIAGNOSIS — R10.13 DYSPEPSIA: ICD-10-CM

## 2018-05-21 DIAGNOSIS — R53.83 FATIGUE, UNSPECIFIED TYPE: ICD-10-CM

## 2018-05-21 DIAGNOSIS — I10 ESSENTIAL HYPERTENSION: ICD-10-CM

## 2018-05-21 PROCEDURE — 87338 HPYLORI STOOL AG IA: CPT

## 2018-05-21 RX ORDER — FERROUS SULFATE 325(65) MG
TABLET ORAL
Qty: 30 TABLET | Refills: 0 | Status: SHIPPED | OUTPATIENT
Start: 2018-05-21 | End: 2018-05-22 | Stop reason: SDUPTHER

## 2018-05-22 DIAGNOSIS — R10.13 DYSPEPSIA: Primary | ICD-10-CM

## 2018-05-22 RX ORDER — FERROUS SULFATE 325(65) MG
TABLET ORAL
Qty: 30 TABLET | Refills: 0 | Status: SHIPPED | OUTPATIENT
Start: 2018-05-22 | End: 2018-07-16 | Stop reason: SDUPTHER

## 2018-05-23 ENCOUNTER — PATIENT MESSAGE (OUTPATIENT)
Dept: FAMILY MEDICINE CLINIC | Facility: CLINIC | Age: 63
End: 2018-05-23

## 2018-05-23 NOTE — TELEPHONE ENCOUNTER
From: Connie Lu  To: Masoud Martin MD  Sent: 5/23/2018 12:18 PM EDT  Subject: Prescription Question    I do not need a refill on ferrous sulfate 325 mg tabs. I have more than enough due to not remembering to take after my morning meal.  Thank you.

## 2018-05-26 LAB — H PYLORI AG STL QL IA: NEGATIVE

## 2018-05-28 DIAGNOSIS — F41.9 ANXIETY: ICD-10-CM

## 2018-05-28 DIAGNOSIS — F34.1 DYSTHYMIA: ICD-10-CM

## 2018-05-29 ENCOUNTER — OFFICE VISIT (OUTPATIENT)
Dept: PSYCHIATRY | Facility: CLINIC | Age: 63
End: 2018-05-29

## 2018-05-29 DIAGNOSIS — F33.1 MODERATE EPISODE OF RECURRENT MAJOR DEPRESSIVE DISORDER (HCC): Primary | ICD-10-CM

## 2018-05-29 DIAGNOSIS — E66.01 MORBID OBESITY (HCC): ICD-10-CM

## 2018-05-29 PROCEDURE — 90791 PSYCH DIAGNOSTIC EVALUATION: CPT | Performed by: PSYCHOLOGIST

## 2018-05-29 RX ORDER — VENLAFAXINE HYDROCHLORIDE 75 MG/1
CAPSULE, EXTENDED RELEASE ORAL
Qty: 30 CAPSULE | Refills: 0 | Status: SHIPPED | OUTPATIENT
Start: 2018-05-29 | End: 2018-06-08

## 2018-05-29 NOTE — PROGRESS NOTES
PROGRESS NOTE    Data:  Connie Lu is a 63 y.o. female who met with the undersigned for a scheduled individual outpatient therapy session from 9:20 - 10:00am.      Clinical Maneuvering/Intervention:      Pt talked about struggling with being overweight most of her life. She lost about 95 lbs in [employer], but soon gained it back, and since then has been largely unsuccessful with attempts to lose weight. She is primarily motivated to get the weight loss surgery/lose weight to help lengthen her life and improve her quality of life. A psychological evaluation was conducted in order to assess past and current level of functioning. Areas assessed included, but were not limited to: perception of social support, perception of ability to face and deal with challenges in life (positive functioning), anxiety symptoms, depressive symptoms, perspective on beliefs/belief system, coping skills for stress, intelligence level, etc. Therapeutic rapport was established. She admits that her mental health is not as good as it could be and the core stressor in her life (besides being overweight) is living with her mother. It was identified that her mother is quite dependent on the pt and there is some work to be done on the pt's self-esteem due to unresolved issues between herself and her mother. Interventions conducted today were geared towards assessing her readiness to receive weight loss surgery (undergo this sort of major life change) and helping the pt identify any current counseling-appropriate needs. She reported making major diet changes in her life including: cutting out bread, cutting out soda, and cutting back quite a bit on her sugar intake. Her biggest challenges to changing her diet are giving up gum and ice cream, but she said tht it is worth it to her in order to be healthy.Education was also provided as to the nature of counseling and what to expect in subsequent sessions. A treatment plan was initiated  tailored to meeting pt’s presenting needs. The pt was encouraged to start attending counseling sessions regularly post-surgery and she agreed that this was a good idea. The pt expressed gratitude for today's session.          Mental Status Exam  Hygiene:  good  Dress: normal  Attitude:  Cooperative and proactive  Motor Activity: normal  Speech: normal  Mood:  depressed   Affect:  congruent  Thought Processes: normal  Thought Content:  normal  Suicidal Thoughts:  not endorsed  Homicidal Thoughts:  not endorsed  Crisis Safety Plan: not needed   Hallucinations:  none      Patient's Support Network Includes:  family, friends      Progress toward goal: there is evidence to suggest that she is taking measures to improve the quality of her life including seeking weight loss surgery and being open to start counseling on a regular basis      Functional Status: moderate      Prognosis: good    Assessment      The pt presented as depressed due to perceptions of herself being overweight, boundary problems with her mother, and engaging in maladapative thoughts (which seems to cause low self-esteem). It became apparent that she is a stronger person that she tends to give herself credit, to which she agreed (thus showing some insight into her problems). She is a great candidate for counseling as therapeutic rapport was established and she seems motivated to receive this sort of treatment. She has some good coping skills for stress: good social support to get the surgery and support in general, strong david in God, and some ability to pull herself out of a rut when feeling down.    From a psychological standpoint, the pt presents as a good candidate for bariatric surgery.  She is motivated for the surgery, has showed readiness for the lifestyle change in terms of adjusting her eating habits, and seems to have appropriate expectations of how to prepare and how to live after surgery in order to lose weight successfully.        Plan      In order to diminish symptoms of depression, the pt will take measures to improve the quality of her health, both physically and mentally. She will continue with the appropriate diet in order to receive the weight loss surgery (for the next several weeks or longer depending on her physician's/dietician's recommendations). She will attending counseling sessions at least every other week post weight loss surgery (ongoing) and read the book Boundaries by Aly (in the next two months) in order to learn how to set healthier boundaries with others.      Glory Trent, PhD, LP

## 2018-06-04 ENCOUNTER — LAB REQUISITION (OUTPATIENT)
Dept: LAB | Facility: HOSPITAL | Age: 63
End: 2018-06-04

## 2018-06-04 ENCOUNTER — OUTSIDE FACILITY SERVICE (OUTPATIENT)
Dept: BARIATRICS/WEIGHT MGMT | Facility: CLINIC | Age: 63
End: 2018-06-04

## 2018-06-04 DIAGNOSIS — K21.9 GASTRO-ESOPHAGEAL REFLUX DISEASE WITHOUT ESOPHAGITIS: ICD-10-CM

## 2018-06-04 PROCEDURE — 88305 TISSUE EXAM BY PATHOLOGIST: CPT | Performed by: SURGERY

## 2018-06-04 PROCEDURE — 43239 EGD BIOPSY SINGLE/MULTIPLE: CPT | Performed by: SURGERY

## 2018-06-06 DIAGNOSIS — K21.9 GASTROESOPHAGEAL REFLUX DISEASE, ESOPHAGITIS PRESENCE NOT SPECIFIED: ICD-10-CM

## 2018-06-06 LAB
CYTO UR: NORMAL
LAB AP CASE REPORT: NORMAL
LAB AP CLINICAL INFORMATION: NORMAL
Lab: NORMAL
PATH REPORT.FINAL DX SPEC: NORMAL
PATH REPORT.GROSS SPEC: NORMAL

## 2018-06-08 ENCOUNTER — OFFICE VISIT (OUTPATIENT)
Dept: FAMILY MEDICINE CLINIC | Facility: CLINIC | Age: 63
End: 2018-06-08

## 2018-06-08 ENCOUNTER — RESULTS ENCOUNTER (OUTPATIENT)
Dept: BARIATRICS/WEIGHT MGMT | Facility: CLINIC | Age: 63
End: 2018-06-08

## 2018-06-08 VITALS
DIASTOLIC BLOOD PRESSURE: 64 MMHG | TEMPERATURE: 97.5 F | WEIGHT: 229.5 LBS | HEART RATE: 75 BPM | SYSTOLIC BLOOD PRESSURE: 106 MMHG | BODY MASS INDEX: 46.27 KG/M2 | RESPIRATION RATE: 14 BRPM | HEIGHT: 59 IN | OXYGEN SATURATION: 98 %

## 2018-06-08 DIAGNOSIS — H66.93 ACUTE OTITIS MEDIA, BILATERAL: ICD-10-CM

## 2018-06-08 DIAGNOSIS — I10 ESSENTIAL HYPERTENSION: ICD-10-CM

## 2018-06-08 DIAGNOSIS — E61.1 IRON DEFICIENCY: ICD-10-CM

## 2018-06-08 DIAGNOSIS — N28.9 KIDNEY FUNCTION ABNORMAL: ICD-10-CM

## 2018-06-08 DIAGNOSIS — J06.9 PROTRACTED URI: Primary | ICD-10-CM

## 2018-06-08 DIAGNOSIS — R73.9 HYPERGLYCEMIA: ICD-10-CM

## 2018-06-08 DIAGNOSIS — R53.83 FATIGUE, UNSPECIFIED TYPE: ICD-10-CM

## 2018-06-08 DIAGNOSIS — E78.00 PURE HYPERCHOLESTEROLEMIA: ICD-10-CM

## 2018-06-08 PROCEDURE — 99213 OFFICE O/P EST LOW 20 MIN: CPT | Performed by: FAMILY MEDICINE

## 2018-06-08 RX ORDER — PREDNISONE 10 MG/1
TABLET ORAL
Qty: 20 TABLET | Refills: 0 | Status: SHIPPED | OUTPATIENT
Start: 2018-06-08 | End: 2018-07-12

## 2018-06-08 RX ORDER — DOXYCYCLINE HYCLATE 100 MG/1
100 CAPSULE ORAL 2 TIMES DAILY
Qty: 20 CAPSULE | Refills: 0 | Status: SHIPPED | OUTPATIENT
Start: 2018-06-08 | End: 2018-07-12

## 2018-06-08 RX ORDER — DICYCLOMINE HCL 20 MG
TABLET ORAL
COMMUNITY
Start: 2018-05-16 | End: 2018-07-12

## 2018-06-08 RX ORDER — LINACLOTIDE 72 UG/1
CAPSULE, GELATIN COATED ORAL
COMMUNITY
Start: 2018-05-21 | End: 2018-07-12

## 2018-06-08 NOTE — PROGRESS NOTES
Assessment/Plan       Problems Addressed this Visit     None      Visit Diagnoses     Protracted URI    -  Primary    Relevant Medications    doxycycline (VIBRAMYCIN) 100 MG capsule    predniSONE (DELTASONE) 10 MG tablet    HYDROcodone-homatropine (HYCODAN) 5-1.5 MG/5ML syrup    Acute otitis media, bilateral        Relevant Medications    doxycycline (VIBRAMYCIN) 100 MG capsule    predniSONE (DELTASONE) 10 MG tablet            Follow up: Return if symptoms worsen or fail to improve, for Next scheduled follow up.     DISCUSSION  Persistent cough with bilateral otitis media.  Start antibiotic, prednisone and cough syrup.  Side effects of hydrocodone cough syrup previously explained.  Previously tolerated well.  Increase fluids and rest.   Call or follow-up if not improving.      MEDICATIONS PRESCRIBED  Requested Prescriptions     Signed Prescriptions Disp Refills   • doxycycline (VIBRAMYCIN) 100 MG capsule 20 capsule 0     Sig: Take 1 capsule by mouth 2 (Two) Times a Day.   • predniSONE (DELTASONE) 10 MG tablet 20 tablet 0     Si po x 2 days then 3 po daily x 2 days then 2 po daily x 2 days then 1 po daily x 2 days then stop.   • HYDROcodone-homatropine (HYCODAN) 5-1.5 MG/5ML syrup 180 mL 0     Sig: Take 5 mL by mouth Every 6 (Six) Hours As Needed for Cough.            Mike dated on 2018  was reviewed and appropriate.        -------------------------------------------    Subjective     Chief Complaint   Patient presents with   • Sore Throat     woke up  with this and it isn't getting any better and not other sx or temp   • Cough     productive.    • Earache     both         Cough   This is a new problem. The current episode started in the past 7 days (x 5 days). The cough is productive of sputum (white to yellow). Associated symptoms include ear pain (both at times), nasal congestion (started over the week) and wheezing (some). Pertinent negatives include no chest pain, hemoptysis or shortness of  "breath. Associated symptoms comments: Decreased sleep due to cough. Risk factors: no ill contacts. She has tried nothing for the symptoms. The treatment provided no relief. Her past medical history is significant for asthma (slight in hot weather).               History   Smoking Status   • Never Smoker   Smokeless Tobacco   • Never Used        Past Medical History,Medications, Allergies, and social history was reviewed.      Review of Systems   Constitutional: Positive for fatigue.   HENT: Positive for ear pain (both at times).    Respiratory: Positive for cough and wheezing (some). Negative for hemoptysis and shortness of breath.    Cardiovascular: Negative for chest pain.   Gastrointestinal: Negative.  Negative for nausea and vomiting.   Psychiatric/Behavioral: Positive for sleep disturbance.       Objective     Vitals:    06/08/18 0926   BP: 106/64   Pulse: 75   Resp: 14   Temp: 97.5 °F (36.4 °C)   TempSrc: Temporal Artery    SpO2: 98%   Weight: 104 kg (229 lb 8 oz)   Height: 151.1 cm (59.49\")          Physical Exam   Constitutional: She appears well-developed and well-nourished.   HENT:   Head: Normocephalic and atraumatic.   Right Ear: Hearing, external ear and ear canal normal. Tympanic membrane is erythematous and bulging.   Left Ear: Hearing, external ear and ear canal normal. Tympanic membrane is erythematous and bulging.   Mouth/Throat: Oropharynx is clear and moist.   Eyes: Conjunctivae and EOM are normal. Pupils are equal, round, and reactive to light.   Neck: Normal range of motion. Neck supple. No thyromegaly present.   Cardiovascular: Normal rate, regular rhythm and normal heart sounds.  Exam reveals no gallop and no friction rub.    No murmur heard.  Pulmonary/Chest: Effort normal. No respiratory distress. She has wheezes ( Expiratory wheeze with cough). She has no rales.   Neurological: She is alert.   Skin: Skin is warm and dry.   Psychiatric: She has a normal mood and affect.   Nursing note and " vitals reviewed.                Masoud Martin MD

## 2018-06-14 ENCOUNTER — OFFICE VISIT (OUTPATIENT)
Dept: FAMILY MEDICINE CLINIC | Facility: CLINIC | Age: 63
End: 2018-06-14

## 2018-06-14 VITALS
BODY MASS INDEX: 45.76 KG/M2 | HEIGHT: 59 IN | OXYGEN SATURATION: 98 % | TEMPERATURE: 97.5 F | SYSTOLIC BLOOD PRESSURE: 128 MMHG | RESPIRATION RATE: 16 BRPM | DIASTOLIC BLOOD PRESSURE: 86 MMHG | HEART RATE: 73 BPM | WEIGHT: 227 LBS

## 2018-06-14 DIAGNOSIS — J06.9 PROTRACTED URI: ICD-10-CM

## 2018-06-14 DIAGNOSIS — IMO0001 CLASS 3 OBESITY DUE TO EXCESS CALORIES WITH BODY MASS INDEX (BMI) OF 45.0 TO 49.9 IN ADULT, UNSPECIFIED WHETHER SERIOUS COMORBIDITY PRESENT: Primary | ICD-10-CM

## 2018-06-14 PROCEDURE — 99213 OFFICE O/P EST LOW 20 MIN: CPT | Performed by: FAMILY MEDICINE

## 2018-06-14 NOTE — PROGRESS NOTES
"  Assessment/Plan       Problems Addressed this Visit     None      Visit Diagnoses     Class 3 obesity due to excess calories with body mass index (BMI) of 45.0 to 49.9 in adult, unspecified whether serious comorbidity present    -  Primary    Protracted URI                Follow up: Return in about 1 month (around 7/14/2018) for Next scheduled follow up.     DISCUSSION  Has lost 12 pounds. Doing well. Rec increase exercise in addition to diet changes.     Finish doxy and call next week if cough not better.     Should have refilled Effexor in the system.  She will obtained from pharmacy.    MEDICATIONS PRESCRIBED  Requested Prescriptions      No prescriptions requested or ordered in this encounter            -------------------------------------------    Subjective     Chief Complaint   Patient presents with   • weight ck   • Med Refill     effexor         History of Present Illness    Obesity  Doing well. No issues. Losing weight.       Diet: Decreased sugars, white bread, sweets and potatoes    Exercise: Mowing lawn. No regular walking     Lost 12 pounds  No chest pain   + cough as noted.     Cough   Getting better but still cough at times  On doxycyline and prednisone  No fevers  Feeling better      History   Smoking Status   • Never Smoker   Smokeless Tobacco   • Never Used        Past Medical History,Medications, Allergies, and social history was reviewed.      Review of Systems   Constitutional: Negative.  Negative for fever.   HENT: Negative.    Respiratory: Positive for cough.    Cardiovascular: Negative.    Gastrointestinal: Negative.        Objective     Vitals:    06/14/18 0839   BP: 128/86   Pulse: 73   Resp: 16   Temp: 97.5 °F (36.4 °C)   TempSrc: Temporal Artery    SpO2: 98%   Weight: 103 kg (227 lb)   Height: 151.1 cm (59.49\")          Physical Exam   Constitutional: She is oriented to person, place, and time. She appears well-developed and well-nourished.   HENT:   Head: Normocephalic and " atraumatic.   Right Ear: Hearing and external ear normal.   Left Ear: Hearing and external ear normal.   Mouth/Throat: Oropharynx is clear and moist.   Eyes: Conjunctivae and EOM are normal. Pupils are equal, round, and reactive to light.   Cardiovascular: Normal rate, regular rhythm and normal heart sounds.  Exam reveals no friction rub.    No murmur heard.  Pulmonary/Chest: Effort normal. No respiratory distress. She has no wheezes. She has rhonchi. She has no rales.   Neurological: She is alert and oriented to person, place, and time.   Skin: Skin is warm.   Psychiatric: She has a normal mood and affect. Her behavior is normal.   Nursing note and vitals reviewed.                Masoud Martin MD

## 2018-06-26 DIAGNOSIS — F34.1 DYSTHYMIA: ICD-10-CM

## 2018-06-26 DIAGNOSIS — F41.9 ANXIETY: ICD-10-CM

## 2018-06-26 RX ORDER — VENLAFAXINE HYDROCHLORIDE 75 MG/1
CAPSULE, EXTENDED RELEASE ORAL
Qty: 30 CAPSULE | Refills: 3 | Status: SHIPPED | OUTPATIENT
Start: 2018-06-26 | End: 2018-06-29 | Stop reason: SDUPTHER

## 2018-06-28 DIAGNOSIS — F34.1 DYSTHYMIA: ICD-10-CM

## 2018-06-28 DIAGNOSIS — F41.9 ANXIETY: ICD-10-CM

## 2018-06-29 RX ORDER — VENLAFAXINE HYDROCHLORIDE 75 MG/1
CAPSULE, EXTENDED RELEASE ORAL
Qty: 30 CAPSULE | Refills: 0 | Status: SHIPPED | OUTPATIENT
Start: 2018-06-29 | End: 2018-08-06 | Stop reason: SDUPTHER

## 2018-06-29 RX ORDER — LEVOTHYROXINE SODIUM 137 UG/1
TABLET ORAL
Qty: 30 TABLET | Refills: 0 | Status: SHIPPED | OUTPATIENT
Start: 2018-06-29 | End: 2018-08-13 | Stop reason: SDUPTHER

## 2018-06-29 RX ORDER — LEVOTHYROXINE SODIUM 137 UG/1
TABLET ORAL
Qty: 30 TABLET | Refills: 0 | Status: SHIPPED | OUTPATIENT
Start: 2018-06-29 | End: 2018-11-01 | Stop reason: SDUPTHER

## 2018-07-10 ENCOUNTER — TELEPHONE (OUTPATIENT)
Dept: FAMILY MEDICINE CLINIC | Facility: CLINIC | Age: 63
End: 2018-07-10

## 2018-07-10 DIAGNOSIS — I10 ESSENTIAL HYPERTENSION: ICD-10-CM

## 2018-07-10 RX ORDER — LOSARTAN POTASSIUM 100 MG/1
100 TABLET ORAL DAILY
Qty: 30 TABLET | Refills: 5 | Status: SHIPPED | OUTPATIENT
Start: 2018-07-10 | End: 2018-12-24 | Stop reason: SDUPTHER

## 2018-07-10 NOTE — TELEPHONE ENCOUNTER
----- Message from Mel Leavitt sent at 7/10/2018  9:17 AM EDT -----  Contact: LONG  PATIENT REQUESTING A REFILL;    [facility] [place]

## 2018-07-12 ENCOUNTER — HOSPITAL ENCOUNTER (OUTPATIENT)
Dept: GENERAL RADIOLOGY | Facility: HOSPITAL | Age: 63
Discharge: HOME | End: 2018-07-12
Admitting: FAMILY MEDICINE

## 2018-07-12 ENCOUNTER — OFFICE VISIT (OUTPATIENT)
Dept: FAMILY MEDICINE CLINIC | Facility: CLINIC | Age: 63
End: 2018-07-12

## 2018-07-12 VITALS
RESPIRATION RATE: 18 BRPM | SYSTOLIC BLOOD PRESSURE: 124 MMHG | HEIGHT: 60 IN | WEIGHT: 227.5 LBS | TEMPERATURE: 97.9 F | BODY MASS INDEX: 44.66 KG/M2 | HEART RATE: 76 BPM | DIASTOLIC BLOOD PRESSURE: 82 MMHG

## 2018-07-12 DIAGNOSIS — IMO0001 CLASS 3 OBESITY DUE TO EXCESS CALORIES WITH BODY MASS INDEX (BMI) OF 45.0 TO 49.9 IN ADULT, UNSPECIFIED WHETHER SERIOUS COMORBIDITY PRESENT: Primary | ICD-10-CM

## 2018-07-12 DIAGNOSIS — R05.9 COUGH: ICD-10-CM

## 2018-07-12 PROCEDURE — 71046 X-RAY EXAM CHEST 2 VIEWS: CPT

## 2018-07-12 PROCEDURE — 99213 OFFICE O/P EST LOW 20 MIN: CPT | Performed by: FAMILY MEDICINE

## 2018-07-12 NOTE — PROGRESS NOTES
Assessment/Plan       Problems Addressed this Visit     None      Visit Diagnoses     Class 3 obesity due to excess calories with body mass index (BMI) of 45.0 to 49.9 in adult, unspecified whether serious comorbidity present (CMS/[facility])    -  Primary    Cough        Relevant Orders    XR Chest PA & Lateral (Completed)            Follow up: Return in about 1 month (around 8/12/2018), or if symptoms worsen or fail to improve.     DISCUSSION  Continue efforts to decrease carbohydrates in diet, increase activity and exercise.  Increase protein.  Follow-up in one month for fourth visit of 6.    Persistent cough.  Recommend check chest x-ray.  Further plan once we have that back.      MEDICATIONS PRESCRIBED  Requested Prescriptions      No prescriptions requested or ordered in this encounter                 -------------------------------------------    Subjective     Chief Complaint   Patient presents with   • Weight Check     F/U         History of Present Illness    Obesity  Doing well. No issues.  Weight increased 8 oz   Monthly weight checks .  3  month of 6   Chest pain: no  Shortness of breath: yes, still with cough. Better but at times, increases with temp change, worse in am. Cough worse in am. No chest xray   Coughing 1 month or more. Thinks drainage and A/C, productive at times        Palpitations : no  Edema: no      Diet: Increased protein, not getting enough veggies. (more fruit, + carb's)  Exercise: no formal exercise at this time. 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 Had a headache, no headache now.         History   Smoking Status   • Never Smoker   Smokeless Tobacco   • Never Used        Past Medical History,Medications, Allergies, and social history was reviewed.      Review of Systems   Constitutional: Negative.    HENT: Negative.    Respiratory: Positive for cough. Negative for shortness of breath and wheezing.   "  Cardiovascular: Negative.    Gastrointestinal: Negative.    Musculoskeletal: Negative.    Psychiatric/Behavioral: Negative.        Objective     Vitals:    07/12/18 0909   BP: 124/82   Pulse: 76   Resp: 18   Temp: 97.9 °F (36.6 °C)   Weight: 103 kg (227 lb 8 oz)   Height: 151.1 cm (59.5\")          Physical Exam   Constitutional: She is oriented to person, place, and time. She appears well-developed and well-nourished.   HENT:   Head: Normocephalic and atraumatic.   Right Ear: Hearing and external ear normal.   Left Ear: Hearing and external ear normal.   Mouth/Throat: Oropharynx is clear and moist.   Eyes: Conjunctivae and EOM are normal. Pupils are equal, round, and reactive to light.   Cardiovascular: Normal rate, regular rhythm and normal heart sounds.  Exam reveals no friction rub.    No murmur heard.  Pulmonary/Chest: Effort normal and breath sounds normal. No respiratory distress. She has no wheezes. She has no rales.   Neurological: She is alert and oriented to person, place, and time.   Skin: Skin is warm.   Psychiatric: She has a normal mood and affect. Her behavior is normal.   Nursing note and vitals reviewed.                Masoud Martin MD    "

## 2018-07-17 RX ORDER — FERROUS SULFATE 325(65) MG
TABLET ORAL
Qty: 30 TABLET | Refills: 3 | Status: SHIPPED | OUTPATIENT
Start: 2018-07-17 | End: 2018-07-18 | Stop reason: SDUPTHER

## 2018-07-18 RX ORDER — FERROUS SULFATE 325(65) MG
TABLET ORAL
Qty: 30 TABLET | Refills: 0 | Status: SHIPPED | OUTPATIENT
Start: 2018-07-18 | End: 2019-01-29 | Stop reason: SDUPTHER

## 2018-07-23 DIAGNOSIS — F34.1 DYSTHYMIA: ICD-10-CM

## 2018-07-23 DIAGNOSIS — F41.9 ANXIETY: ICD-10-CM

## 2018-07-23 RX ORDER — VENLAFAXINE HYDROCHLORIDE 75 MG/1
CAPSULE, EXTENDED RELEASE ORAL
Qty: 30 CAPSULE | Refills: 0 | Status: SHIPPED | OUTPATIENT
Start: 2018-07-23 | End: 2018-08-06 | Stop reason: SDUPTHER

## 2018-08-06 DIAGNOSIS — F34.1 DYSTHYMIA: ICD-10-CM

## 2018-08-06 DIAGNOSIS — F41.9 ANXIETY: ICD-10-CM

## 2018-08-06 RX ORDER — VENLAFAXINE HYDROCHLORIDE 150 MG/1
CAPSULE, EXTENDED RELEASE ORAL
Qty: 30 CAPSULE | Refills: 2 | Status: SHIPPED | OUTPATIENT
Start: 2018-08-06 | End: 2018-08-13 | Stop reason: SDUPTHER

## 2018-08-07 DIAGNOSIS — F41.9 ANXIETY: ICD-10-CM

## 2018-08-07 DIAGNOSIS — F34.1 DYSTHYMIA: ICD-10-CM

## 2018-08-07 RX ORDER — VENLAFAXINE HYDROCHLORIDE 150 MG/1
CAPSULE, EXTENDED RELEASE ORAL
Qty: 30 CAPSULE | Refills: 0 | OUTPATIENT
Start: 2018-08-07

## 2018-08-08 DIAGNOSIS — F41.9 ANXIETY: ICD-10-CM

## 2018-08-08 DIAGNOSIS — F34.1 DYSTHYMIA: ICD-10-CM

## 2018-08-08 RX ORDER — VENLAFAXINE HYDROCHLORIDE 150 MG/1
CAPSULE, EXTENDED RELEASE ORAL
Qty: 30 CAPSULE | Refills: 0 | Status: SHIPPED | OUTPATIENT
Start: 2018-08-08 | End: 2018-11-13

## 2018-08-13 ENCOUNTER — TELEPHONE (OUTPATIENT)
Dept: FAMILY MEDICINE CLINIC | Facility: CLINIC | Age: 63
End: 2018-08-13

## 2018-08-13 ENCOUNTER — OFFICE VISIT (OUTPATIENT)
Dept: FAMILY MEDICINE CLINIC | Facility: CLINIC | Age: 63
End: 2018-08-13

## 2018-08-13 VITALS
WEIGHT: 225 LBS | OXYGEN SATURATION: 99 % | HEIGHT: 59 IN | BODY MASS INDEX: 45.36 KG/M2 | RESPIRATION RATE: 16 BRPM | DIASTOLIC BLOOD PRESSURE: 90 MMHG | HEART RATE: 81 BPM | TEMPERATURE: 97.5 F | SYSTOLIC BLOOD PRESSURE: 124 MMHG

## 2018-08-13 DIAGNOSIS — M79.641 BILATERAL HAND PAIN: ICD-10-CM

## 2018-08-13 DIAGNOSIS — R73.03 PREDIABETES: ICD-10-CM

## 2018-08-13 DIAGNOSIS — M79.642 BILATERAL HAND PAIN: ICD-10-CM

## 2018-08-13 DIAGNOSIS — IMO0001 CLASS 3 OBESITY DUE TO EXCESS CALORIES WITH BODY MASS INDEX (BMI) OF 45.0 TO 49.9 IN ADULT, UNSPECIFIED WHETHER SERIOUS COMORBIDITY PRESENT: Primary | ICD-10-CM

## 2018-08-13 LAB
POC CREATININE URINE: 200
POC MICROALBUMIN URINE: 30

## 2018-08-13 PROCEDURE — 99213 OFFICE O/P EST LOW 20 MIN: CPT | Performed by: FAMILY MEDICINE

## 2018-08-13 PROCEDURE — 82044 UR ALBUMIN SEMIQUANTITATIVE: CPT | Performed by: FAMILY MEDICINE

## 2018-08-13 NOTE — PROGRESS NOTES
Assessment/Plan       Problems Addressed this Visit        Other    Prediabetes    Relevant Orders    POC Microalbumin (Completed)      Other Visit Diagnoses     Class 3 obesity due to excess calories with body mass index (BMI) of 45.0 to 49.9 in adult, unspecified whether serious comorbidity present (CMS/[facility])    -  Primary    Bilateral hand pain                Follow up: Return in about 1 month (around 9/13/2018).     DISCUSSION  Obesity.  Continue efforts with diet and weight loss including decrease carbohydrates.  This is visit 4 of 6 for monitoring prior to bariatric surgery.    History of hyperglycemia/prediabetes.  A1c previously has been good.  Microalbumin was done today and normal.    Hand pain.  Suspect carpal tunnel.  Call for results of nerve conduction study done to orthopedic office.      MEDICATIONS PRESCRIBED  Requested Prescriptions      No prescriptions requested or ordered in this encounter          -------------------------------------------    Subjective     Chief Complaint   Patient presents with   • Obesity     1 month follow up   • Labs Only     hep c screening       Here for 4 of 6 visits for Bariatric    Obesity   This is a chronic (follow up for monthy weigh in , diet is the same. Increased protein and trying to more veggies.  Some exercise. No overeating.  Lost 2 pounds this month) problem. The current episode started more than 1 year ago. The problem occurs daily. The problem has been unchanged (lost 2 pounds). Associated symptoms include arthralgias (hand pain ). Pertinent negatives include no change in bowel habit. Nothing aggravates the symptoms. Treatments tried: diet and exercise. The treatment provided mild relief.       Hyperglycemia  No formal diagnosis of DM  A1c previously was good.    Hand pain   At times, hurt and will not stop. Dx CTS in the past years ago,.   Numbness and cold feeling.   Sharp stinging pain   ? Arthritis and no eval for this  INcreased pain after  "increased use  At times, will just start  No issues during the night   Had nerve conduction with Dr Nova. ? Severe \"something\"        History   Smoking Status   • Never Smoker   Smokeless Tobacco   • Never Used        Past Medical History,Medications, Allergies, and social history was reviewed.      Review of Systems   Constitutional: Negative.    HENT: Negative.    Respiratory: Negative.    Cardiovascular: Negative.    Gastrointestinal: Negative.  Negative for change in bowel habit.   Musculoskeletal: Positive for arthralgias (hand pain ).   Neurological: Negative.    Psychiatric/Behavioral: Negative.        Objective     Vitals:    08/13/18 0941   BP: 124/90   Pulse: 81   Resp: 16   Temp: 97.5 °F (36.4 °C)   TempSrc: Temporal Artery    SpO2: 99%   Weight: 102 kg (225 lb)   Height: 151.1 cm (59.49\")          Physical Exam   Constitutional: She is oriented to person, place, and time. She appears well-developed and well-nourished.   HENT:   Head: Normocephalic and atraumatic.   Right Ear: Hearing and external ear normal.   Left Ear: Hearing and external ear normal.   Mouth/Throat: Oropharynx is clear and moist.   Eyes: Pupils are equal, round, and reactive to light. Conjunctivae and EOM are normal.   Cardiovascular: Normal rate, regular rhythm and normal heart sounds.  Exam reveals no friction rub.    No murmur heard.  Pulmonary/Chest: Effort normal and breath sounds normal. No respiratory distress. She has no wheezes. She has no rales.   Neurological: She is alert and oriented to person, place, and time.   Skin: Skin is warm.   Psychiatric: She has a normal mood and affect. Her behavior is normal.   Nursing note and vitals reviewed.  Tinel's and Phalen sign are currently negative.              Masoud Martin MD    "

## 2018-09-10 ENCOUNTER — TELEPHONE (OUTPATIENT)
Dept: FAMILY MEDICINE CLINIC | Facility: CLINIC | Age: 63
End: 2018-09-10

## 2018-09-10 RX ORDER — TRIAMTERENE AND HYDROCHLOROTHIAZIDE 37.5; 25 MG/1; MG/1
1 TABLET ORAL DAILY
Qty: 30 TABLET | Refills: 2 | Status: SHIPPED | OUTPATIENT
Start: 2018-09-10 | End: 2018-11-23 | Stop reason: HOSPADM

## 2018-09-10 NOTE — TELEPHONE ENCOUNTER
----- Message from Mel Lindsey sent at 9/10/2018  3:46 PM EDT -----  Contact: SARAHPT CALLED  REFILL ON triamterene-hydrochlorothiazide (MAXZIDE-25) 37.5-25 MG per tablet  PHARMACY [facility]  [phone]

## 2018-09-13 ENCOUNTER — OFFICE VISIT (OUTPATIENT)
Dept: FAMILY MEDICINE CLINIC | Facility: CLINIC | Age: 63
End: 2018-09-13

## 2018-09-13 VITALS
HEART RATE: 72 BPM | TEMPERATURE: 97.1 F | HEIGHT: 59 IN | BODY MASS INDEX: 45.76 KG/M2 | RESPIRATION RATE: 18 BRPM | SYSTOLIC BLOOD PRESSURE: 116 MMHG | WEIGHT: 227 LBS | DIASTOLIC BLOOD PRESSURE: 68 MMHG

## 2018-09-13 DIAGNOSIS — IMO0001 CLASS 3 OBESITY DUE TO EXCESS CALORIES WITH BODY MASS INDEX (BMI) OF 45.0 TO 49.9 IN ADULT, UNSPECIFIED WHETHER SERIOUS COMORBIDITY PRESENT: Primary | ICD-10-CM

## 2018-09-13 DIAGNOSIS — H92.03 OTALGIA OF BOTH EARS: ICD-10-CM

## 2018-09-13 PROCEDURE — 99213 OFFICE O/P EST LOW 20 MIN: CPT | Performed by: FAMILY MEDICINE

## 2018-09-13 NOTE — PROGRESS NOTES
Assessment/Plan       Problems Addressed this Visit     None      Visit Diagnoses     Class 3 obesity due to excess calories with body mass index (BMI) of 45.0 to 49.9 in adult, unspecified whether serious comorbidity present (CMS/[facility])    -  Primary    Continue diet and wt loss. Increase exercise. Avoid unnecessary sweets and carbs.     Otalgia of both ears        No infection on exam. Call if continues and does not get better. May be intermittent Eustachain tube dysfunction            Follow up: Return in about 1 month (around 10/13/2018).     DISCUSSION  Recheck in one month. Call if no bowel movement or ears do not improve.         MEDICATIONS PRESCRIBED  Requested Prescriptions      No prescriptions requested or ordered in this encounter              -------------------------------------------    Subjective     Chief Complaint   Patient presents with   • Weight Check     1 month         History of Present Illness    Obesity  Follow up Obesity and pending bariatric surg  Diet struggles: needs to increase veggies. Some days, had ice cream.     Drinks water and tea in am (1/2 sweet)    Exercising: some bike riding. Not consistent. Busy and lot to do at home and work.     Ear pain   Deep pain   Not constant  Hearing decreased at times  + ringing at times. Not constant  Had some cough in am, occ prod and yellow  No fevers  Itch at times    Constipated  Does not recall when last BM, 3-4 days?  No meds for it   Did take Linzess this am and will help         History   Smoking Status   • Never Smoker   Smokeless Tobacco   • Never Used        Past Medical History,Medications, Allergies, and social history was reviewed.      Review of Systems   Constitutional: Negative.    HENT: Positive for ear pain.    Cardiovascular: Negative.    Gastrointestinal: Positive for constipation.   Psychiatric/Behavioral: Negative.        Objective     Vitals:    09/13/18 0944   BP: 116/68   Pulse: 72   Resp: 18   Temp: 97.1 °F (36.2 °C)  "  Weight: 103 kg (227 lb)   Height: 151.1 cm (59.49\")          Physical Exam   Constitutional: She is oriented to person, place, and time. She appears well-developed and well-nourished.   HENT:   Head: Normocephalic and atraumatic.   Right Ear: Hearing and external ear normal. Tympanic membrane is retracted. Tympanic membrane is not erythematous.   Left Ear: Hearing and external ear normal. Tympanic membrane is retracted. Tympanic membrane is not erythematous.   Mouth/Throat: Oropharynx is clear and moist.   Eyes: Pupils are equal, round, and reactive to light. Conjunctivae and EOM are normal.   Cardiovascular: Normal rate, regular rhythm and normal heart sounds.  Exam reveals no friction rub.    No murmur heard.  Pulmonary/Chest: Effort normal and breath sounds normal. No respiratory distress. She has no wheezes. She has no rales.   Neurological: She is alert and oriented to person, place, and time.   Skin: Skin is warm.   Psychiatric: She has a normal mood and affect. Her behavior is normal.   Nursing note and vitals reviewed.                Masoud Martin MD    "

## 2018-10-12 ENCOUNTER — OFFICE VISIT (OUTPATIENT)
Dept: FAMILY MEDICINE CLINIC | Facility: CLINIC | Age: 63
End: 2018-10-12

## 2018-10-12 VITALS
TEMPERATURE: 97.9 F | HEIGHT: 59 IN | WEIGHT: 226.5 LBS | HEART RATE: 84 BPM | OXYGEN SATURATION: 98 % | BODY MASS INDEX: 45.66 KG/M2 | SYSTOLIC BLOOD PRESSURE: 114 MMHG | DIASTOLIC BLOOD PRESSURE: 82 MMHG | RESPIRATION RATE: 22 BRPM

## 2018-10-12 DIAGNOSIS — E66.01 CLASS 3 SEVERE OBESITY DUE TO EXCESS CALORIES WITHOUT SERIOUS COMORBIDITY WITH BODY MASS INDEX (BMI) OF 45.0 TO 49.9 IN ADULT (HCC): Primary | ICD-10-CM

## 2018-10-12 DIAGNOSIS — R01.1 HEART MURMUR: ICD-10-CM

## 2018-10-12 DIAGNOSIS — Z23 NEED FOR INFLUENZA VACCINATION: ICD-10-CM

## 2018-10-12 PROBLEM — E66.813 CLASS 3 SEVERE OBESITY DUE TO EXCESS CALORIES WITHOUT SERIOUS COMORBIDITY WITH BODY MASS INDEX (BMI) OF 45.0 TO 49.9 IN ADULT: Status: ACTIVE | Noted: 2018-10-12

## 2018-10-12 PROCEDURE — 90471 IMMUNIZATION ADMIN: CPT | Performed by: FAMILY MEDICINE

## 2018-10-12 PROCEDURE — 90674 CCIIV4 VAC NO PRSV 0.5 ML IM: CPT | Performed by: FAMILY MEDICINE

## 2018-10-12 PROCEDURE — 99213 OFFICE O/P EST LOW 20 MIN: CPT | Performed by: FAMILY MEDICINE

## 2018-10-12 NOTE — PROGRESS NOTES
Assessment/Plan       Problems Addressed this Visit        Cardiovascular and Mediastinum    Heart murmur       Digestive    Class 3 severe obesity due to excess calories without serious comorbidity with body mass index (BMI) of 45.0 to 49.9 in adult (CMS/[facility]) - Primary      Other Visit Diagnoses     Need for influenza vaccination        Relevant Orders    Flucelvax Quad=>4Years (PFS) (Completed)            Follow up: Return if symptoms worsen or fail to improve.     DISCUSSION  Final visit for bariatric weight loss observation.  Over last 6 months.  She is only lost 12.5 pounds but having typical due to knee issues and unable to increase exercise.  We will fax final form to bariatric surgery.    Flu shot today.    Heart murmur.  Patient reports that she has had a cardiac workup by Dr. Ari Boyd in [place] and has been cleared for surgery.      MEDICATIONS PRESCRIBED  Requested Prescriptions      No prescriptions requested or ordered in this encounter              -------------------------------------------    Subjective     Chief Complaint   Patient presents with   • Obesity     ONE MONTH FOLLOW UP         History of Present Illness    Obesity  Doing well. No issues. Has been hard to lose weight.   No meds for this    hoping to have gastric sleeve    Chest pain: no  Shortness of breath: no  Palpitations : no  Edema: no      Diet: Trying to increase proteins and veggies. Water more now    Exercise: walking some but not far. Back issues. Legs go numb for years if walks far.     Increased CTS but waiting on surg due to miss work, works at [employer] in [place], [employer]    End of Sept, + ear issues off and on  One am, woke up and lost hearing in one ear and then during the day, got better. OK now.     History of heart murmur  Dr Gonzalez Boyd cleared for surg this year    Had EGD done and has gallstones so to have gallbladder removed when has gastric sleeve      History   Smoking Status   • Never Smoker  "  Smokeless Tobacco   • Never Used        Past Medical History,Medications, Allergies, and social history was reviewed.      Review of Systems   Constitutional: Negative.    HENT: Positive for ear pain and hearing loss.    Respiratory: Negative.    Cardiovascular: Negative.    Gastrointestinal: Negative.    Psychiatric/Behavioral: Negative.        Objective     Vitals:    10/12/18 1040   BP: 114/82   Pulse: 84   Resp: 22   Temp: 97.9 °F (36.6 °C)   TempSrc: Temporal Artery    SpO2: 98%   Weight: 103 kg (226 lb 8 oz)   Height: 151.1 cm (59.49\")          Physical Exam   Constitutional: She is oriented to person, place, and time. She appears well-developed and well-nourished.   Obese     HENT:   Head: Normocephalic and atraumatic.   Right Ear: Hearing and external ear normal. Tympanic membrane is retracted. Tympanic membrane is not erythematous.   Left Ear: Hearing and external ear normal. Tympanic membrane is retracted. Tympanic membrane is not erythematous.   Mouth/Throat: Oropharynx is clear and moist.   Eyes: Pupils are equal, round, and reactive to light. Conjunctivae and EOM are normal.   Cardiovascular: Normal rate and regular rhythm.  Exam reveals no friction rub.    Murmur heard.   Systolic murmur is present   Very faint murmur   Pulmonary/Chest: Effort normal and breath sounds normal. No respiratory distress. She has no wheezes. She has no rales.   Neurological: She is alert and oriented to person, place, and time.   Skin: Skin is warm.   Psychiatric: She has a normal mood and affect. Her behavior is normal.   Nursing note and vitals reviewed.                Masoud Martin MD    "

## 2018-10-15 ENCOUNTER — TELEPHONE (OUTPATIENT)
Dept: FAMILY MEDICINE CLINIC | Facility: CLINIC | Age: 63
End: 2018-10-15

## 2018-10-15 DIAGNOSIS — R73.03 PREDIABETES: Primary | ICD-10-CM

## 2018-10-15 DIAGNOSIS — E03.9 ACQUIRED HYPOTHYROIDISM: ICD-10-CM

## 2018-10-15 DIAGNOSIS — Z98.84 BARIATRIC SURGERY STATUS: ICD-10-CM

## 2018-10-15 NOTE — TELEPHONE ENCOUNTER
----- Message from Tri Alvarado MA sent at 10/15/2018  4:09 PM EDT -----  Patient called requesting a referral to Endocrinologist, requesting to stay in [place]     [phone] patient call back

## 2018-10-16 NOTE — TELEPHONE ENCOUNTER
bariatric called and told her that she needs to follow up with endo for her pre diabetes and hypothyroidism.

## 2018-10-16 NOTE — TELEPHONE ENCOUNTER
Please call and let her know that I placed referral for this.  However, there are no endocrinologists in [place] so we will have to go to [place].

## 2018-10-16 NOTE — TELEPHONE ENCOUNTER
SPOKE WITH PT AND INFORMED HER OF THIS AND PT VERBALIZED UNDERSTANDING. PT WILL BE CK TO SEE WHO IS IN NETWORK.

## 2018-10-18 ENCOUNTER — TELEPHONE (OUTPATIENT)
Dept: FAMILY MEDICINE CLINIC | Facility: CLINIC | Age: 63
End: 2018-10-18

## 2018-10-23 DIAGNOSIS — R06.00 DYSPNEA, UNSPECIFIED TYPE: ICD-10-CM

## 2018-10-23 DIAGNOSIS — R53.83 FATIGUE, UNSPECIFIED TYPE: Primary | ICD-10-CM

## 2018-10-24 ENCOUNTER — HOSPITAL ENCOUNTER (OUTPATIENT)
Dept: GENERAL RADIOLOGY | Facility: HOSPITAL | Age: 63
Discharge: HOME | End: 2018-10-24
Admitting: PHYSICIAN ASSISTANT

## 2018-10-24 ENCOUNTER — LAB (OUTPATIENT)
Dept: LAB | Facility: HOSPITAL | Age: 63
End: 2018-10-24

## 2018-10-24 DIAGNOSIS — R06.00 DYSPNEA, UNSPECIFIED TYPE: ICD-10-CM

## 2018-10-24 DIAGNOSIS — R53.83 FATIGUE, UNSPECIFIED TYPE: ICD-10-CM

## 2018-10-24 LAB
ALBUMIN SERPL-MCNC: 4.27 G/DL (ref 3.2–4.8)
ALBUMIN/GLOB SERPL: 1.8 G/DL (ref 1.5–2.5)
ALP SERPL-CCNC: 99 U/L (ref 25–100)
ALT SERPL W P-5'-P-CCNC: 21 U/L (ref 7–40)
ANION GAP SERPL CALCULATED.3IONS-SCNC: 8 MMOL/L (ref 3–11)
AST SERPL-CCNC: 26 U/L (ref 0–33)
BILIRUB SERPL-MCNC: 0.4 MG/DL (ref 0.3–1.2)
BUN BLD-MCNC: 25 MG/DL (ref 9–23)
BUN/CREAT SERPL: 31.6 (ref 7–25)
CALCIUM SPEC-SCNC: 9.4 MG/DL (ref 8.7–10.4)
CHLORIDE SERPL-SCNC: 102 MMOL/L (ref 99–109)
CO2 SERPL-SCNC: 32 MMOL/L (ref 20–31)
CREAT BLD-MCNC: 0.79 MG/DL (ref 0.6–1.3)
DEPRECATED RDW RBC AUTO: 53.1 FL (ref 37–54)
ERYTHROCYTE [DISTWIDTH] IN BLOOD BY AUTOMATED COUNT: 16.2 % (ref 11.3–14.5)
GFR SERPL CREATININE-BSD FRML MDRD: 74 ML/MIN/1.73
GLOBULIN UR ELPH-MCNC: 2.3 GM/DL
GLUCOSE BLD-MCNC: 98 MG/DL (ref 70–100)
HCT VFR BLD AUTO: 45.1 % (ref 34.5–44)
HGB BLD-MCNC: 14.2 G/DL (ref 11.5–15.5)
MCH RBC QN AUTO: 28.2 PG (ref 27–31)
MCHC RBC AUTO-ENTMCNC: 31.5 G/DL (ref 32–36)
MCV RBC AUTO: 89.7 FL (ref 80–99)
PLATELET # BLD AUTO: 272 10*3/MM3 (ref 150–450)
PMV BLD AUTO: 10.3 FL (ref 6–12)
POTASSIUM BLD-SCNC: 4.7 MMOL/L (ref 3.5–5.5)
PROT SERPL-MCNC: 6.6 G/DL (ref 5.7–8.2)
RBC # BLD AUTO: 5.03 10*6/MM3 (ref 3.89–5.14)
SODIUM BLD-SCNC: 142 MMOL/L (ref 132–146)
WBC NRBC COR # BLD: 8.86 10*3/MM3 (ref 3.5–10.8)

## 2018-10-24 PROCEDURE — 85027 COMPLETE CBC AUTOMATED: CPT

## 2018-10-24 PROCEDURE — 93005 ELECTROCARDIOGRAM TRACING: CPT

## 2018-10-24 PROCEDURE — 71046 X-RAY EXAM CHEST 2 VIEWS: CPT

## 2018-10-24 PROCEDURE — 80053 COMPREHEN METABOLIC PANEL: CPT

## 2018-10-24 PROCEDURE — 93010 ELECTROCARDIOGRAM REPORT: CPT | Performed by: INTERNAL MEDICINE

## 2018-10-24 PROCEDURE — 36415 COLL VENOUS BLD VENIPUNCTURE: CPT

## 2018-10-25 DIAGNOSIS — R06.00 DYSPNEA, UNSPECIFIED TYPE: ICD-10-CM

## 2018-10-25 DIAGNOSIS — R53.83 FATIGUE, UNSPECIFIED TYPE: ICD-10-CM

## 2018-10-25 PROCEDURE — 93005 ELECTROCARDIOGRAM TRACING: CPT

## 2018-10-30 ENCOUNTER — CONSULT (OUTPATIENT)
Dept: BARIATRICS/WEIGHT MGMT | Facility: CLINIC | Age: 63
End: 2018-10-30

## 2018-10-30 VITALS
DIASTOLIC BLOOD PRESSURE: 76 MMHG | RESPIRATION RATE: 18 BRPM | BODY MASS INDEX: 44.17 KG/M2 | HEIGHT: 60 IN | WEIGHT: 225 LBS | SYSTOLIC BLOOD PRESSURE: 122 MMHG | OXYGEN SATURATION: 99 % | HEART RATE: 74 BPM | TEMPERATURE: 97.7 F

## 2018-10-30 DIAGNOSIS — E66.01 OBESITY, CLASS III, BMI 40-49.9 (MORBID OBESITY) (HCC): Primary | ICD-10-CM

## 2018-10-30 DIAGNOSIS — F34.1 DYSTHYMIA: ICD-10-CM

## 2018-10-30 DIAGNOSIS — F41.9 ANXIETY: ICD-10-CM

## 2018-10-30 PROCEDURE — 99214 OFFICE O/P EST MOD 30 MIN: CPT | Performed by: SURGERY

## 2018-10-30 RX ORDER — ACETAMINOPHEN 325 MG/1
650 TABLET ORAL ONCE
Status: CANCELLED | OUTPATIENT
Start: 2018-10-30 | End: 2018-10-30

## 2018-10-30 RX ORDER — SCOLOPAMINE TRANSDERMAL SYSTEM 1 MG/1
1 PATCH, EXTENDED RELEASE TRANSDERMAL CONTINUOUS
Status: CANCELLED | OUTPATIENT
Start: 2018-10-30 | End: 2018-11-02

## 2018-10-30 RX ORDER — PANTOPRAZOLE SODIUM 40 MG/10ML
40 INJECTION, POWDER, LYOPHILIZED, FOR SOLUTION INTRAVENOUS ONCE
Status: CANCELLED | OUTPATIENT
Start: 2018-10-30

## 2018-10-30 RX ORDER — CHLORHEXIDINE GLUCONATE 0.12 MG/ML
15 RINSE ORAL ONCE
Status: CANCELLED | OUTPATIENT
Start: 2018-10-30

## 2018-10-30 RX ORDER — SODIUM CHLORIDE 0.9 % (FLUSH) 0.9 %
3 SYRINGE (ML) INJECTION EVERY 12 HOURS SCHEDULED
Status: CANCELLED | OUTPATIENT
Start: 2018-10-30

## 2018-10-30 RX ORDER — SODIUM CHLORIDE 0.9 % (FLUSH) 0.9 %
3-10 SYRINGE (ML) INJECTION AS NEEDED
Status: CANCELLED | OUTPATIENT
Start: 2018-10-30

## 2018-10-30 RX ORDER — SODIUM CHLORIDE, SODIUM LACTATE, POTASSIUM CHLORIDE, CALCIUM CHLORIDE 600; 310; 30; 20 MG/100ML; MG/100ML; MG/100ML; MG/100ML
100 INJECTION, SOLUTION INTRAVENOUS CONTINUOUS
Status: CANCELLED | OUTPATIENT
Start: 2018-10-30

## 2018-10-30 RX ORDER — VENLAFAXINE HYDROCHLORIDE 150 MG/1
CAPSULE, EXTENDED RELEASE ORAL
Qty: 30 CAPSULE | Refills: 3 | Status: SHIPPED | OUTPATIENT
Start: 2018-10-30 | End: 2019-01-29 | Stop reason: SDUPTHER

## 2018-10-30 NOTE — PROGRESS NOTES
[facility] BARIATRIC SURGERY  [address]  [facility] [address]  [phone]      Patient  Name:  Connie Lu  :  1955      Date of Visit: 10/30/18    Chief Complaint:  weight gain; unable to maintain weight loss. Preop LSG    History of Present Illness:  Connie Lu is a 63 y.o. female who presents today for evaluation, education and consultation regarding weight loss surgery. Since last seen 10/12/18 she has lost a lb. The patient returns for final visit prior to LSG.  Original intake evaluation Emma Cid PA-C  reviewed.  62 yo MO WF from [place], came here bc remembers when I did WLS in [facility]. 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  The patient has had issues with morbid obesity for years and only temporary success with non-surgical methods of weight loss.  The patient is seeking LSG to help with the morbid obesity related conditions of anxiety, asthma, unRx RADHA, ADD, CTS, chronic constipation, depression, diverticulosis, hiatal hernia w GERD and esophagitis, heart murmur, HTN, hypothyroidism, JASON, joint pain, CKD, low back pain, colon polyps, pre DM, chronic cholecystitis/cholelithiasis, degenerative spine changes on CXR.      Past Medical History:   Diagnosis Date   • Anxiety    • Asthma     worse with heat, has inhaler for rare need   • Attention deficit disorder    • Boil     states it was not MRSA, once, many years ago   • Breast cancer (CMS/HCC) 2010    estrogen driven. S/p left mastectomy and right reduction. No requirements for radiation or chemo.  5 yrs tamoxifen   • Carpal tunnel syndrome    • Chronic cholecystitis with calculus     US stones Has epi pain, nausea   • Constipation     on linzess   • Depression    • Diverticulosis     noted on colonoscopy   • Heart murmur     Has seen cardiologist 2017, had preop workup for left TKR   • Hiatal hernia with gastroesophageal reflux disease and esophagitis     controlled  "with esomeprazole.  EGD [facility] 6/18 HH, gr II esophagitis, superf antral ulcerations, 35 cm zline, path anturm neg h. pyl, stool ag 5/18 neg h. pyl.  path DE reflux   • History of blood transfusion     as an infant, unknown season.    • Hypertension    • Hypothyroidism    • Insomnia    • Iron deficiency     on iron supplements   • Joint pain     effexor helps. No NSAIDS, no injections.    • Kidney function abnormal     \"little low\" on previous labs, advised increased hydration   • Lower back pain    • RADHA (obstructive sleep apnea)     Non CPAP compliant   • Polyp of sigmoid colon    • Prediabetes     Hb A1C 6.10 5/18     Past Surgical History:   Procedure Laterality Date   • BREAST SURGERY Right 2010    Reduction   • COLONOSCOPY  2016    diverticulosis, h/p benign polyps   • MASTECTOMY WITH IMMEDIATE RECONSTRUCTION Left 2010   • REPLACEMENT TOTAL KNEE Left 08/2017    Dr Nova   • RETINAL DETACHMENT REPAIR Left 07/2017   • TONSILLECTOMY AND ADENOIDECTOMY  1960, 1962       Allergies   Allergen Reactions   • Cephalexin Hives and Itching     Tolerates PCN fine, tolerates other cephalosporins well.        Current Outpatient Prescriptions:   •  ferrous sulfate 325 (65 FE) MG tablet, TAKE ONE TABLET BY MOUTH EVERY DAY WITH BREAKFAST, Disp: 30 tablet, Rfl: 0  •  levothyroxine (SYNTHROID, LEVOTHROID) 137 MCG tablet, TAKE ONE TABLET BY MOUTH EVERY DAY, Disp: 30 tablet, Rfl: 0  •  losartan (COZAAR) 100 MG tablet, Take 1 tablet by mouth Daily., Disp: 30 tablet, Rfl: 5  •  ondansetron ODT (ZOFRAN-ODT) 4 MG disintegrating tablet, , Disp: , Rfl:   •  traZODone (DESYREL) 50 MG tablet, Take 1-2 tablets by mouth every night at bedtime., Disp: 30 tablet, Rfl: 2  •  triamterene-hydrochlorothiazide (MAXZIDE-25) 37.5-25 MG per tablet, Take 1 tablet by mouth Daily., Disp: 30 tablet, Rfl: 2  •  venlafaxine XR (EFFEXOR-XR) 150 MG 24 hr capsule, TAKE ONE CAPSULE BY MOUTH EVERY DAY, Disp: 30 capsule, Rfl: 0  •  docusate sodium (COLACE) 250 MG " capsule, Take 250 mg by mouth Daily., Disp: , Rfl:   •  venlafaxine XR (EFFEXOR-XR) 150 MG 24 hr capsule, TAKE ONE CAPSULE BY MOUTH EVERY DAY, Disp: 30 capsule, Rfl: 3    Social History     Social History   • Marital status: Single     Spouse name: N/A   • Number of children: N/A   • Years of education: N/A     Occupational History   • unemployed      Social History Main Topics   • Smoking status: Never Smoker   • Smokeless tobacco: Never Used   • Alcohol use No   • Drug use: No   • Sexual activity: Not on file      Comment: no hormones     Other Topics Concern   • Not on file     Social History Narrative    Lives in [place] with mother.  Works part times as  for [employer] in [place].      Family History   Problem Relation Age of Onset   • Diabetes Father    • Leukemia Father    • Skin cancer Father    • Heart attack Father    • Heart disease Father    • Sleep apnea Father    • Obesity Mother    • Hypertension Mother    • Skin cancer Brother    • Kidney failure Maternal Grandmother    • Hypertension Maternal Grandmother    • Hypertension Maternal Grandfather    • Stomach cancer Maternal Grandfather    • Hypertension Paternal Grandmother    • Obesity Paternal Grandmother    • Heart disease Paternal Grandmother    • Hypertension Paternal Grandfather    • Heart disease Paternal Grandfather        Review of Systems   Constitutional: Positive for fatigue. Negative for chills, diaphoresis, fever, unexpected weight gain and unexpected weight loss.   HENT: Negative for congestion and facial swelling.    Eyes: Negative for blurred vision, double vision and discharge.   Respiratory: Negative for chest tightness, shortness of breath and stridor.    Cardiovascular: Negative for chest pain, palpitations and leg swelling.   Gastrointestinal: Negative for blood in stool.   Endocrine: Negative for polydipsia.   Genitourinary: Negative for hematuria.   Musculoskeletal: Positive for arthralgias.   Skin: Negative for color  change.   Allergic/Immunologic: Negative for immunocompromised state.   Neurological: Negative for confusion.   Psychiatric/Behavioral: Negative for self-injury.       I have reviewed the ROS and confirm that it's accurate today.    Physical Exam:  Vital Signs:  Weight: 102 kg (225 lb)   Body mass index is 44.68 kg/m².  Temp: 97.7 °F (36.5 °C)   Heart Rate: 74   BP: 122/76     Physical Exam   Constitutional: She is oriented to person, place, and time. She appears well-developed and well-nourished.   HENT:   Head: Normocephalic and atraumatic.   Nose: Nose normal.   Eyes: Pupils are equal, round, and reactive to light. Conjunctivae and EOM are normal.   Neck: Normal range of motion. Neck supple. Carotid bruit is not present. No tracheal deviation present. No thyromegaly present.   Cardiovascular: Normal rate, regular rhythm and normal heart sounds.    No murmur apprec   Pulmonary/Chest: Effort normal and breath sounds normal. No respiratory distress.   Abdominal: Soft. She exhibits no distension. There is no hepatosplenomegaly. There is no tenderness.   Musculoskeletal: Normal range of motion. She exhibits no edema or deformity.   Neurological: She is alert and oriented to person, place, and time. No cranial nerve deficit. Coordination normal.   Skin: Skin is warm and dry. No rash noted.   Psychiatric: She has a normal mood and affect. Her behavior is normal. Judgment and thought content normal.   Vitals reviewed.      Patient Active Problem List   Diagnosis   • Allergic rhinitis   • Malignant neoplasm of breast (CMS/HCC)   • Carpal tunnel syndrome   • Depression   • Hyperlipidemia   • Hypertension   • Hypothyroidism   • Obstructive sleep apnea syndrome   • Morbid obesity (CMS/HCC)   • Knee pain   • Vitamin D deficiency   • Hyperglycemia   • Primary insomnia   • Anxiety   • Diverticulosis   • Insomnia   • Heart murmur   • Lower back pain   • Kidney function abnormal   • Joint pain   • Iron deficiency   • GERD  (gastroesophageal reflux disease)   • Attention deficit disorder   • Asthma   • Prediabetes   • RADHA (obstructive sleep apnea)   • Class 3 severe obesity due to excess calories without serious comorbidity with body mass index (BMI) of 45.0 to 49.9 in adult (CMS/[facility])   Psych Twan PhD 5/18 good  [facility] - HC x 2, OC x 1    Assessment:    Connie Lu is a 63 y.o. year old female with medically complicated obesity.    Weight loss surgery is deemed medically necessary given the following obesity related comorbidities including anxiety, asthma, unRx RADHA, ADD, CTS, chronic constipation, depression, diverticulosis, hiatal hernia w GERD and esophagitis, heart murmur, HTN, hypothyroidism, JASON, joint pain, CKD, low back pain, colon polyps, pre DM, chronic cholecystitis/cholelithiasis, degenerative spine changes on CXR with current Weight: 102 kg (225 lb) and Body mass index is 44.68 kg/m²..    Patient is aware that surgery is a tool, and that weight loss is not guaranteed but only seen in the context of appropriate use, follow up and exercise.    The patient was present for an approximately a 2.5 hour discussion of the purpose of weight loss surgery, how WLS is a tool to assist in achieving weight loss goals, the most common complications and how best to avoid them, and the strategies for short and long term weight loss.  Ample opportunity to discuss questions was available both in group and during the time of individual examination.    I reviewed CBC, CMP, EKG, CXR, EGD, HP, Cardiac Clearance, PFT's, Psych evaluation and RAFAL.  Please see scanned records that I have reviewed and signed off on today.  All of this in addition to the patient's unique history and exam has been taken into consideration in determining their appropriate candidacy for weight loss surgery.    Complications  of laparoscopic/possible robotic gastric sleeve were discussed. The patient is well aware of the potential complications of surgery that  "include but not limited to bleeding, infections, deep venous thrombosis, pulmonary embolism, pulmonary complications such as pneumonia, cardiac events, hernias, small bowel obstruction, damage to the spleen or other organs, bowel injury, disfiguring scars, failure to lose weight, need for additional surgery, conversion to an open procedure, and death. Patient is also aware of complications which apply in this particular procedure that can include but are not limited to a \"leak\" at the staple line which in some instances may require conversion to gastric bypass.    The patient is aware if a hiatal hernia is encountered, it likely will be repaired.  R/B/A Rx to hiatal hernia repair were discussed as outlined in our long consent form.  Briefly risks in addition to those for LSG include recurrent hernia, LIO, dysphagia, esophageal injury, pneumothorax, injury to the vagus nerves, injury to the thoracic duct, aorta or vena cava.    I discussed avoiding all tobacco products and second hand smoke at least 2 weeks pre-operatively and 6 weeks post-operatively to minimize the risk of sleeve leak.  This included discussing the importance of avoiding even secondhand smoke as the risk of leak is increased.  Examples discussed:  I made it very clear that the patient understands they should avoid even riding in a car where someone has previously smoked in the last 2 weeks, living in a house where someone smokes (even if it's in a separate room/patio/attached garage, etc.) we discussed that they should not have a conversation with a group of people who are smoking even if it's outside.  They can be around wood burning fires and barbecue.  I told them I do not know if marijuana has a same effects but my overall recommendation is to avoid it for 2 weeks prior in 6 weeks after surgery.  They also are aware that nicotine may also increase the risk of leak and I strongly encouraged him to avoid that as well for 2 weeks prior in 6 weeks " after surgery.    Discussed the risks, benefits and alternative therapies at great length as outlined in our extensive consent forms, consent videos, and educational teaching process under the direction of the center's .    A copy of the patient's signed informed consent is on file.    R/b/a rx discussed including but not limited to bleeding, infection, bile leak, bile duct injury, bowel injury, pulm complications, venothromboembolic events, death etc and wishes to proceed with concomitant lap lalit.  Aware may not alleviate symptoms and further work up may be warranted.    R/B/A Rx discussed to postop anticoagulation incl but not limited to bleeding, drug reaction, venothromboembolic events, etc. and she declined.      Plan:  Laparoscopic sleeve gastrectomy, lap HHR, lap lalit.        Maksim Jhaveri MD

## 2018-10-31 PROBLEM — E66.01 OBESITY, CLASS III, BMI 40-49.9 (MORBID OBESITY): Status: ACTIVE | Noted: 2018-10-31

## 2018-11-01 RX ORDER — LEVOTHYROXINE SODIUM 137 UG/1
TABLET ORAL
Qty: 30 TABLET | Refills: 1 | Status: SHIPPED | OUTPATIENT
Start: 2018-11-01 | End: 2018-12-25 | Stop reason: SDUPTHER

## 2018-11-13 ENCOUNTER — OFFICE VISIT (OUTPATIENT)
Dept: FAMILY MEDICINE CLINIC | Facility: CLINIC | Age: 63
End: 2018-11-13

## 2018-11-13 VITALS
SYSTOLIC BLOOD PRESSURE: 132 MMHG | BODY MASS INDEX: 44.47 KG/M2 | WEIGHT: 226.5 LBS | TEMPERATURE: 97.1 F | HEIGHT: 60 IN | DIASTOLIC BLOOD PRESSURE: 82 MMHG | HEART RATE: 80 BPM

## 2018-11-13 DIAGNOSIS — H66.003 ACUTE SUPPURATIVE OTITIS MEDIA OF BOTH EARS WITHOUT SPONTANEOUS RUPTURE OF TYMPANIC MEMBRANES, RECURRENCE NOT SPECIFIED: Primary | ICD-10-CM

## 2018-11-13 PROCEDURE — 99213 OFFICE O/P EST LOW 20 MIN: CPT | Performed by: FAMILY MEDICINE

## 2018-11-13 RX ORDER — AZITHROMYCIN 250 MG/1
TABLET, FILM COATED ORAL
Qty: 6 TABLET | Refills: 0 | Status: SHIPPED | OUTPATIENT
Start: 2018-11-13 | End: 2018-11-23 | Stop reason: HOSPADM

## 2018-11-16 ENCOUNTER — APPOINTMENT (OUTPATIENT)
Dept: PREADMISSION TESTING | Facility: HOSPITAL | Age: 63
End: 2018-11-16

## 2018-11-16 ENCOUNTER — TELEPHONE (OUTPATIENT)
Dept: FAMILY MEDICINE CLINIC | Facility: CLINIC | Age: 63
End: 2018-11-16

## 2018-11-16 DIAGNOSIS — Z01.89 LABORATORY TEST: Primary | ICD-10-CM

## 2018-11-16 LAB
ABO GROUP BLD: NORMAL
DEPRECATED RDW RBC AUTO: 52.5 FL (ref 37–54)
ERYTHROCYTE [DISTWIDTH] IN BLOOD BY AUTOMATED COUNT: 16.2 % (ref 11.3–14.5)
HBA1C MFR BLD: 6 % (ref 4.8–5.6)
HCT VFR BLD AUTO: 48.8 % (ref 34.5–44)
HGB BLD-MCNC: 15.9 G/DL (ref 11.5–15.5)
MCH RBC QN AUTO: 28.6 PG (ref 27–31)
MCHC RBC AUTO-ENTMCNC: 32.6 G/DL (ref 32–36)
MCV RBC AUTO: 87.8 FL (ref 80–99)
PLATELET # BLD AUTO: 266 10*3/MM3 (ref 150–450)
PMV BLD AUTO: 10.2 FL (ref 6–12)
RBC # BLD AUTO: 5.56 10*6/MM3 (ref 3.89–5.14)
RH BLD: POSITIVE
WBC NRBC COR # BLD: 9.08 10*3/MM3 (ref 3.5–10.8)

## 2018-11-16 PROCEDURE — 83036 HEMOGLOBIN GLYCOSYLATED A1C: CPT | Performed by: ANESTHESIOLOGY

## 2018-11-16 PROCEDURE — 86900 BLOOD TYPING SEROLOGIC ABO: CPT

## 2018-11-16 PROCEDURE — 85027 COMPLETE CBC AUTOMATED: CPT | Performed by: ANESTHESIOLOGY

## 2018-11-16 PROCEDURE — 86901 BLOOD TYPING SEROLOGIC RH(D): CPT

## 2018-11-16 PROCEDURE — 36415 COLL VENOUS BLD VENIPUNCTURE: CPT

## 2018-11-16 RX ORDER — OMEPRAZOLE 20 MG/1
20 CAPSULE, DELAYED RELEASE ORAL DAILY
COMMUNITY
End: 2018-11-23 | Stop reason: HOSPADM

## 2018-11-16 RX ORDER — DEXTROMETHORPHAN HYDROBROMIDE AND PROMETHAZINE HYDROCHLORIDE 15; 6.25 MG/5ML; MG/5ML
5 SYRUP ORAL 4 TIMES DAILY PRN
Qty: 180 ML | Refills: 1 | Status: SHIPPED | OUTPATIENT
Start: 2018-11-16 | End: 2018-12-12

## 2018-11-16 NOTE — TELEPHONE ENCOUNTER
Patient informed and wanted to make sure there is not NSAID or Steriod components in this medication. States she can not have NSAID/steriods before her scheduled surgery.

## 2018-11-16 NOTE — TELEPHONE ENCOUNTER
Please call, I sent in a cough med. Promethazine DM to use. May make her sleepy. If gets worse over the weekend, rec Urgent care visit. Finish the zpak given on 11/13

## 2018-11-16 NOTE — PAT
Patient states she will finish the zpak tomorrow. Instructed patient to contact Dr. Jhaveri's office if symptoms do not improve or get worse in the next few days.     Patient to apply Chlorhexadine wipes  to surgical area (as instructed) the night before procedure and the AM of procedure. Wipes provided.    Too early to draw type and screen in PAT.  Please obtain specimen in pre-op on the day of surgery.

## 2018-11-16 NOTE — TELEPHONE ENCOUNTER
----- Message from Carolee Cary sent at 11/16/2018 10:46 AM EST -----  Contact: DR ALVES   PATIENT HAS DEVELOPED A REALLY BAD COUGH AND IT HAS GONE DOWN IN HER CHEST. WILL YOU SEND IN SOMETHING TO [facility] FOR HER?  [phone]

## 2018-11-16 NOTE — PAT
"Dr Jhaveri office ( [facility]) notified that pt was prescribed a z-eduardo yesterday for URI, developed a cough today and was added Phenergan DM cough. No further orders received .     Pt indicated that she did not start z-eduardo as of yet. Instructed her to take the antibiotic as prescribed, especially considering that her s/s have worsened overnight.     Pt reports that she has a c-pap machine that she does not use. She also states \" Dr jhaveri told me to bring it in anyway\".  Pt instructed to bring min mask and tubing.   "

## 2018-11-16 NOTE — DISCHARGE INSTRUCTIONS
The following information and instructions were given:    NPO after MN except sips of water with routine prescribed medication (except blood thinner, diabetes, or weight reducing medication) unless otherwise instructed by your physician.  Do not eat, drink, smoke or chew gum after midnight the night before surgery. This also includes no mints.    DO NOT shave for two days before your procedure.  Do not wear makeup.      DO NOT wear fingernail polish (gel/regular) and/or acrylic/artificial nails on the day of surgery.   If a patient had recent manicure and would rather not remove polish or artificial nails, then the minimum requirement is that the polish/artificial nails must be removed from the middle finger on each hand.      If patient is having surgery/procedure on an upper extremity, then the patient was instructed that fingernail polish/artificial fingernails must be removed for surgery.  NO EXCEPTIONS.      If patient is having surgery/procedure on a lower extremity, then the patient was instructed that toenail polish on both extremities must be removed for surgery.  NO EXCEPTIONS.    Remove all jewelry (advised to go to jeweler if unable to remove).  Jewelry, especially rings, can no longer be taped for surgery.    Leave anything you consider valuable at home.    Leave your suitcase in the car until after your surgery.    Bring the following with you (if applicable)       -picture ID and insurance cards       -Co-pay/deductible required by insurance       -Medications in the original bottles (not a list) including all over-the-counter  medications if not brought to PAT       -Copy of advance directive, living will or power of  documents if not  brought to [facility]       -CPAP or BIPAP mask and tubing (do not bring machine)       -Skin prep instructions sheet       -PAT Pass    Education booklet, brochure, handout or binder given to patient.    Pain Control After Surgery handout given to  patient.    Respirex use (handout given to patient) and pneumonia prevention.    Signs and Symptoms of infection discussed.    DVT Prevention education given.  Stressing the importance of ambulation.    Patient to apply Chlorhexadine wipes to surgical area (as instructed) the night before procedure and the AM of procedure.    Patient instructed to bring CPAP mask and tubing to the hospital for overnight stay.  Explained that it is not necessary to bring their CPAP machine to the hospital instead a CPAP machine will be provided for use by the hospital. If patient knows their CPAP settings, those settings will be implemented.  If not, the CPAP machine will be utilized on the auto setting using their mask and tubing.    Patient verbalized understanding.

## 2018-11-20 ENCOUNTER — ANESTHESIA EVENT (OUTPATIENT)
Dept: PERIOP | Facility: HOSPITAL | Age: 63
End: 2018-11-20

## 2018-11-20 RX ORDER — FAMOTIDINE 10 MG/ML
20 INJECTION, SOLUTION INTRAVENOUS ONCE
Status: CANCELLED | OUTPATIENT
Start: 2018-11-20 | End: 2018-11-20

## 2018-11-20 RX ORDER — SODIUM CHLORIDE 0.9 % (FLUSH) 0.9 %
3 SYRINGE (ML) INJECTION EVERY 12 HOURS SCHEDULED
Status: CANCELLED | OUTPATIENT
Start: 2018-11-20

## 2018-11-20 RX ORDER — FAMOTIDINE 20 MG/1
20 TABLET, FILM COATED ORAL ONCE
Status: CANCELLED | OUTPATIENT
Start: 2018-11-20 | End: 2018-11-20

## 2018-11-20 RX ORDER — SODIUM CHLORIDE, SODIUM LACTATE, POTASSIUM CHLORIDE, CALCIUM CHLORIDE 600; 310; 30; 20 MG/100ML; MG/100ML; MG/100ML; MG/100ML
9 INJECTION, SOLUTION INTRAVENOUS CONTINUOUS
Status: CANCELLED | OUTPATIENT
Start: 2018-11-20

## 2018-11-20 RX ORDER — SODIUM CHLORIDE 0.9 % (FLUSH) 0.9 %
3-10 SYRINGE (ML) INJECTION AS NEEDED
Status: CANCELLED | OUTPATIENT
Start: 2018-11-20

## 2018-11-21 ENCOUNTER — HOSPITAL ENCOUNTER (INPATIENT)
Facility: HOSPITAL | Age: 63
LOS: 2 days | Discharge: HOME | End: 2018-11-23
Attending: SURGERY | Admitting: SURGERY

## 2018-11-21 ENCOUNTER — ANESTHESIA (OUTPATIENT)
Dept: PERIOP | Facility: HOSPITAL | Age: 63
End: 2018-11-21

## 2018-11-21 DIAGNOSIS — E66.01 OBESITY, CLASS III, BMI 40-49.9 (MORBID OBESITY) (HCC): ICD-10-CM

## 2018-11-21 LAB
ABO GROUP BLD: NORMAL
BLD GP AB SCN SERPL QL: NEGATIVE
POTASSIUM BLDA-SCNC: 3.37 MMOL/L (ref 3.5–5.3)
RH BLD: POSITIVE
T&S EXPIRATION DATE: NORMAL

## 2018-11-21 PROCEDURE — 25010000002 NEOSTIGMINE 10 MG/10ML SOLUTION: Performed by: NURSE ANESTHETIST, CERTIFIED REGISTERED

## 2018-11-21 PROCEDURE — 25010000002 BUPRENORPHINE PER 0.1 MG: Performed by: ANESTHESIOLOGY

## 2018-11-21 PROCEDURE — 47562 LAPAROSCOPIC CHOLECYSTECTOMY: CPT | Performed by: SURGERY

## 2018-11-21 PROCEDURE — 94799 UNLISTED PULMONARY SVC/PX: CPT

## 2018-11-21 PROCEDURE — 25010000002 DEXAMETHASONE PER 1 MG: Performed by: NURSE ANESTHETIST, CERTIFIED REGISTERED

## 2018-11-21 PROCEDURE — 88304 TISSUE EXAM BY PATHOLOGIST: CPT | Performed by: SURGERY

## 2018-11-21 PROCEDURE — 25010000002 DEXAMETHASONE SODIUM PHOSPHATE 10 MG/ML SOLUTION: Performed by: ANESTHESIOLOGY

## 2018-11-21 PROCEDURE — 25010000002 FENTANYL CITRATE (PF) 100 MCG/2ML SOLUTION: Performed by: NURSE ANESTHETIST, CERTIFIED REGISTERED

## 2018-11-21 PROCEDURE — 25010000002 MIDAZOLAM PER 1 MG: Performed by: NURSE ANESTHETIST, CERTIFIED REGISTERED

## 2018-11-21 PROCEDURE — 0DB64Z3 EXCISION OF STOMACH, PERCUTANEOUS ENDOSCOPIC APPROACH, VERTICAL: ICD-10-PCS | Performed by: SURGERY

## 2018-11-21 PROCEDURE — 88307 TISSUE EXAM BY PATHOLOGIST: CPT | Performed by: SURGERY

## 2018-11-21 PROCEDURE — 25010000002 ONDANSETRON PER 1 MG: Performed by: NURSE ANESTHETIST, CERTIFIED REGISTERED

## 2018-11-21 PROCEDURE — 86900 BLOOD TYPING SEROLOGIC ABO: CPT | Performed by: SURGERY

## 2018-11-21 PROCEDURE — 25010000002 PROPOFOL 1000 MG/ML EMULSION: Performed by: NURSE ANESTHETIST, CERTIFIED REGISTERED

## 2018-11-21 PROCEDURE — 25010000002 PROPOFOL 10 MG/ML EMULSION: Performed by: NURSE ANESTHETIST, CERTIFIED REGISTERED

## 2018-11-21 PROCEDURE — 25010000002 LEVOFLOXACIN PER 250 MG: Performed by: SURGERY

## 2018-11-21 PROCEDURE — 0FT44ZZ RESECTION OF GALLBLADDER, PERCUTANEOUS ENDOSCOPIC APPROACH: ICD-10-PCS | Performed by: SURGERY

## 2018-11-21 PROCEDURE — 86850 RBC ANTIBODY SCREEN: CPT | Performed by: SURGERY

## 2018-11-21 PROCEDURE — 94660 CPAP INITIATION&MGMT: CPT

## 2018-11-21 PROCEDURE — 25010000002 ENOXAPARIN PER 10 MG: Performed by: SURGERY

## 2018-11-21 PROCEDURE — 0BQT4ZZ REPAIR DIAPHRAGM, PERCUTANEOUS ENDOSCOPIC APPROACH: ICD-10-PCS | Performed by: SURGERY

## 2018-11-21 PROCEDURE — 84132 ASSAY OF SERUM POTASSIUM: CPT | Performed by: ANESTHESIOLOGY

## 2018-11-21 PROCEDURE — 0DJ08ZZ INSPECTION OF UPPER INTESTINAL TRACT, VIA NATURAL OR ARTIFICIAL OPENING ENDOSCOPIC: ICD-10-PCS | Performed by: SURGERY

## 2018-11-21 PROCEDURE — 43775 LAP SLEEVE GASTRECTOMY: CPT | Performed by: SURGERY

## 2018-11-21 PROCEDURE — 86901 BLOOD TYPING SEROLOGIC RH(D): CPT | Performed by: SURGERY

## 2018-11-21 DEVICE — REINFORCED INTELLIGENT RELOAD
Type: IMPLANTABLE DEVICE | Site: STOMACH | Status: FUNCTIONAL
Brand: TRI-STAPLE 2.0

## 2018-11-21 DEVICE — BLACK REINFORCED INTELLIGENT RELOAD, FOR USE WITH SIGNIA STAPLING SYSTEM
Type: IMPLANTABLE DEVICE | Site: STOMACH | Status: FUNCTIONAL
Brand: TRI-STAPLE 2.0

## 2018-11-21 DEVICE — REINFORCED INTELLIGENT RELOAD, FOR USE WITH SIGNIA STAPLING SYSTEM
Type: IMPLANTABLE DEVICE | Site: STOMACH | Status: FUNCTIONAL
Brand: TRI-STAPLE 2.0

## 2018-11-21 DEVICE — SEALANT FIBRIN TISSEEL FZ 4ML: Type: IMPLANTABLE DEVICE | Site: STOMACH | Status: FUNCTIONAL

## 2018-11-21 RX ORDER — PANTOPRAZOLE SODIUM 40 MG/10ML
40 INJECTION, POWDER, LYOPHILIZED, FOR SOLUTION INTRAVENOUS ONCE
Status: COMPLETED | OUTPATIENT
Start: 2018-11-21 | End: 2018-11-21

## 2018-11-21 RX ORDER — MIDAZOLAM HYDROCHLORIDE 1 MG/ML
INJECTION INTRAMUSCULAR; INTRAVENOUS AS NEEDED
Status: DISCONTINUED | OUTPATIENT
Start: 2018-11-21 | End: 2018-11-21 | Stop reason: SURG

## 2018-11-21 RX ORDER — DEXAMETHASONE SODIUM PHOSPHATE 4 MG/ML
8 VIAL (ML) INJECTION ONCE AS NEEDED
Status: DISCONTINUED | OUTPATIENT
Start: 2018-11-21 | End: 2018-11-21 | Stop reason: HOSPADM

## 2018-11-21 RX ORDER — LOSARTAN POTASSIUM 50 MG/1
100 TABLET ORAL DAILY
Status: DISCONTINUED | OUTPATIENT
Start: 2018-11-22 | End: 2018-11-23 | Stop reason: HOSPADM

## 2018-11-21 RX ORDER — DIPHENHYDRAMINE HCL 25 MG
25 CAPSULE ORAL EVERY 6 HOURS PRN
Status: DISCONTINUED | OUTPATIENT
Start: 2018-11-21 | End: 2018-11-23 | Stop reason: HOSPADM

## 2018-11-21 RX ORDER — LIDOCAINE HYDROCHLORIDE 20 MG/ML
INJECTION, SOLUTION INFILTRATION; PERINEURAL AS NEEDED
Status: DISCONTINUED | OUTPATIENT
Start: 2018-11-21 | End: 2018-11-21 | Stop reason: SURG

## 2018-11-21 RX ORDER — CYANOCOBALAMIN 1000 UG/ML
1000 INJECTION, SOLUTION INTRAMUSCULAR; SUBCUTANEOUS ONCE
Status: COMPLETED | OUTPATIENT
Start: 2018-11-22 | End: 2018-11-22

## 2018-11-21 RX ORDER — ONDANSETRON 2 MG/ML
4 INJECTION INTRAMUSCULAR; INTRAVENOUS ONCE AS NEEDED
Status: DISCONTINUED | OUTPATIENT
Start: 2018-11-21 | End: 2018-11-21 | Stop reason: HOSPADM

## 2018-11-21 RX ORDER — SIMETHICONE 80 MG
80 TABLET,CHEWABLE ORAL 4 TIMES DAILY PRN
Status: DISCONTINUED | OUTPATIENT
Start: 2018-11-21 | End: 2018-11-23 | Stop reason: HOSPADM

## 2018-11-21 RX ORDER — LEVOFLOXACIN 5 MG/ML
500 INJECTION, SOLUTION INTRAVENOUS ONCE
Status: COMPLETED | OUTPATIENT
Start: 2018-11-21 | End: 2018-11-21

## 2018-11-21 RX ORDER — DEXAMETHASONE SODIUM PHOSPHATE 10 MG/ML
INJECTION INTRAMUSCULAR; INTRAVENOUS AS NEEDED
Status: DISCONTINUED | OUTPATIENT
Start: 2018-11-21 | End: 2018-11-21 | Stop reason: SURG

## 2018-11-21 RX ORDER — NEOSTIGMINE METHYLSULFATE 1 MG/ML
INJECTION, SOLUTION INTRAVENOUS AS NEEDED
Status: DISCONTINUED | OUTPATIENT
Start: 2018-11-21 | End: 2018-11-21 | Stop reason: SURG

## 2018-11-21 RX ORDER — DEXAMETHASONE SODIUM PHOSPHATE 10 MG/ML
INJECTION, SOLUTION INTRAMUSCULAR; INTRAVENOUS
Status: COMPLETED | OUTPATIENT
Start: 2018-11-21 | End: 2018-11-21

## 2018-11-21 RX ORDER — METOCLOPRAMIDE HYDROCHLORIDE 5 MG/ML
10 INJECTION INTRAMUSCULAR; INTRAVENOUS EVERY 6 HOURS PRN
Status: DISCONTINUED | OUTPATIENT
Start: 2018-11-21 | End: 2018-11-23 | Stop reason: HOSPADM

## 2018-11-21 RX ORDER — ONDANSETRON 4 MG/1
4 TABLET, FILM COATED ORAL EVERY 6 HOURS PRN
Status: DISCONTINUED | OUTPATIENT
Start: 2018-11-21 | End: 2018-11-23 | Stop reason: HOSPADM

## 2018-11-21 RX ORDER — GLYCOPYRROLATE 0.2 MG/ML
INJECTION INTRAMUSCULAR; INTRAVENOUS AS NEEDED
Status: DISCONTINUED | OUTPATIENT
Start: 2018-11-21 | End: 2018-11-21 | Stop reason: SURG

## 2018-11-21 RX ORDER — CHLORHEXIDINE GLUCONATE 0.12 MG/ML
15 RINSE ORAL ONCE
Status: COMPLETED | OUTPATIENT
Start: 2018-11-21 | End: 2018-11-21

## 2018-11-21 RX ORDER — CLONIDINE HYDROCHLORIDE 0.1 MG/1
0.1 TABLET ORAL EVERY 6 HOURS PRN
Status: DISCONTINUED | OUTPATIENT
Start: 2018-11-21 | End: 2018-11-23 | Stop reason: HOSPADM

## 2018-11-21 RX ORDER — MAGNESIUM HYDROXIDE 1200 MG/15ML
LIQUID ORAL AS NEEDED
Status: DISCONTINUED | OUTPATIENT
Start: 2018-11-21 | End: 2018-11-21 | Stop reason: HOSPADM

## 2018-11-21 RX ORDER — SODIUM CHLORIDE 0.9 % (FLUSH) 0.9 %
3-10 SYRINGE (ML) INJECTION AS NEEDED
Status: DISCONTINUED | OUTPATIENT
Start: 2018-11-21 | End: 2018-11-21 | Stop reason: HOSPADM

## 2018-11-21 RX ORDER — SODIUM CHLORIDE AND POTASSIUM CHLORIDE 150; 450 MG/100ML; MG/100ML
125 INJECTION, SOLUTION INTRAVENOUS CONTINUOUS
Status: DISCONTINUED | OUTPATIENT
Start: 2018-11-22 | End: 2018-11-23 | Stop reason: HOSPADM

## 2018-11-21 RX ORDER — TRAZODONE HYDROCHLORIDE 50 MG/1
50 TABLET ORAL NIGHTLY PRN
Status: DISCONTINUED | OUTPATIENT
Start: 2018-11-21 | End: 2018-11-23 | Stop reason: HOSPADM

## 2018-11-21 RX ORDER — LORAZEPAM 1 MG/1
1 TABLET ORAL EVERY 12 HOURS PRN
Status: DISCONTINUED | OUTPATIENT
Start: 2018-11-21 | End: 2018-11-23 | Stop reason: HOSPADM

## 2018-11-21 RX ORDER — HYDROMORPHONE HYDROCHLORIDE 2 MG/1
2 TABLET ORAL EVERY 4 HOURS PRN
Status: DISCONTINUED | OUTPATIENT
Start: 2018-11-21 | End: 2018-11-23 | Stop reason: HOSPADM

## 2018-11-21 RX ORDER — ONDANSETRON 2 MG/ML
INJECTION INTRAMUSCULAR; INTRAVENOUS AS NEEDED
Status: DISCONTINUED | OUTPATIENT
Start: 2018-11-21 | End: 2018-11-21 | Stop reason: SURG

## 2018-11-21 RX ORDER — SCOLOPAMINE TRANSDERMAL SYSTEM 1 MG/1
1 PATCH, EXTENDED RELEASE TRANSDERMAL CONTINUOUS
Status: DISCONTINUED | OUTPATIENT
Start: 2018-11-21 | End: 2018-11-21 | Stop reason: HOSPADM

## 2018-11-21 RX ORDER — HYDROMORPHONE HYDROCHLORIDE 1 MG/ML
0.5 INJECTION, SOLUTION INTRAMUSCULAR; INTRAVENOUS; SUBCUTANEOUS
Status: DISCONTINUED | OUTPATIENT
Start: 2018-11-21 | End: 2018-11-21 | Stop reason: HOSPADM

## 2018-11-21 RX ORDER — ATRACURIUM BESYLATE 10 MG/ML
INJECTION, SOLUTION INTRAVENOUS AS NEEDED
Status: DISCONTINUED | OUTPATIENT
Start: 2018-11-21 | End: 2018-11-21 | Stop reason: SURG

## 2018-11-21 RX ORDER — MORPHINE SULFATE 4 MG/ML
4 INJECTION, SOLUTION INTRAMUSCULAR; INTRAVENOUS
Status: DISCONTINUED | OUTPATIENT
Start: 2018-11-21 | End: 2018-11-23 | Stop reason: HOSPADM

## 2018-11-21 RX ORDER — MORPHINE SULFATE 4 MG/ML
6 INJECTION, SOLUTION INTRAMUSCULAR; INTRAVENOUS
Status: DISCONTINUED | OUTPATIENT
Start: 2018-11-21 | End: 2018-11-23 | Stop reason: HOSPADM

## 2018-11-21 RX ORDER — ROCURONIUM BROMIDE 10 MG/ML
INJECTION, SOLUTION INTRAVENOUS AS NEEDED
Status: DISCONTINUED | OUTPATIENT
Start: 2018-11-21 | End: 2018-11-21 | Stop reason: SURG

## 2018-11-21 RX ORDER — BUPIVACAINE HYDROCHLORIDE 2.5 MG/ML
INJECTION, SOLUTION EPIDURAL; INFILTRATION; INTRACAUDAL
Status: COMPLETED | OUTPATIENT
Start: 2018-11-21 | End: 2018-11-21

## 2018-11-21 RX ORDER — PANTOPRAZOLE SODIUM 40 MG/10ML
40 INJECTION, POWDER, LYOPHILIZED, FOR SOLUTION INTRAVENOUS
Status: DISCONTINUED | OUTPATIENT
Start: 2018-11-22 | End: 2018-11-23 | Stop reason: HOSPADM

## 2018-11-21 RX ORDER — ESMOLOL HYDROCHLORIDE 10 MG/ML
INJECTION INTRAVENOUS AS NEEDED
Status: DISCONTINUED | OUTPATIENT
Start: 2018-11-21 | End: 2018-11-21 | Stop reason: SURG

## 2018-11-21 RX ORDER — SODIUM CHLORIDE 9 MG/ML
INJECTION, SOLUTION INTRAVENOUS AS NEEDED
Status: DISCONTINUED | OUTPATIENT
Start: 2018-11-21 | End: 2018-11-21 | Stop reason: HOSPADM

## 2018-11-21 RX ORDER — NALOXONE HCL 0.4 MG/ML
0.4 VIAL (ML) INJECTION
Status: DISCONTINUED | OUTPATIENT
Start: 2018-11-21 | End: 2018-11-23 | Stop reason: HOSPADM

## 2018-11-21 RX ORDER — PROPOFOL 10 MG/ML
VIAL (ML) INTRAVENOUS AS NEEDED
Status: DISCONTINUED | OUTPATIENT
Start: 2018-11-21 | End: 2018-11-21 | Stop reason: SURG

## 2018-11-21 RX ORDER — LORAZEPAM 2 MG/ML
0.5 INJECTION INTRAMUSCULAR EVERY 12 HOURS PRN
Status: DISCONTINUED | OUTPATIENT
Start: 2018-11-21 | End: 2018-11-23 | Stop reason: HOSPADM

## 2018-11-21 RX ORDER — ONDANSETRON 2 MG/ML
4 INJECTION INTRAMUSCULAR; INTRAVENOUS EVERY 6 HOURS PRN
Status: DISCONTINUED | OUTPATIENT
Start: 2018-11-21 | End: 2018-11-23 | Stop reason: HOSPADM

## 2018-11-21 RX ORDER — SODIUM CHLORIDE, SODIUM LACTATE, POTASSIUM CHLORIDE, CALCIUM CHLORIDE 600; 310; 30; 20 MG/100ML; MG/100ML; MG/100ML; MG/100ML
150 INJECTION, SOLUTION INTRAVENOUS CONTINUOUS
Status: ACTIVE | OUTPATIENT
Start: 2018-11-21 | End: 2018-11-22

## 2018-11-21 RX ORDER — ALBUTEROL SULFATE 2.5 MG/3ML
2.5 SOLUTION RESPIRATORY (INHALATION) EVERY 4 HOURS PRN
Status: DISCONTINUED | OUTPATIENT
Start: 2018-11-21 | End: 2018-11-23 | Stop reason: HOSPADM

## 2018-11-21 RX ORDER — LIDOCAINE HYDROCHLORIDE 10 MG/ML
0.5 INJECTION, SOLUTION EPIDURAL; INFILTRATION; INTRACAUDAL; PERINEURAL ONCE AS NEEDED
Status: COMPLETED | OUTPATIENT
Start: 2018-11-21 | End: 2018-11-21

## 2018-11-21 RX ORDER — BUPRENORPHINE HYDROCHLORIDE 0.32 MG/ML
INJECTION INTRAMUSCULAR; INTRAVENOUS
Status: COMPLETED | OUTPATIENT
Start: 2018-11-21 | End: 2018-11-21

## 2018-11-21 RX ORDER — PROMETHAZINE HYDROCHLORIDE 25 MG/ML
12.5 INJECTION, SOLUTION INTRAMUSCULAR; INTRAVENOUS EVERY 6 HOURS PRN
Status: DISCONTINUED | OUTPATIENT
Start: 2018-11-21 | End: 2018-11-23 | Stop reason: HOSPADM

## 2018-11-21 RX ORDER — PROMETHAZINE HYDROCHLORIDE 25 MG/ML
12.5 INJECTION, SOLUTION INTRAMUSCULAR; INTRAVENOUS EVERY 6 HOURS PRN
Status: DISCONTINUED | OUTPATIENT
Start: 2018-11-21 | End: 2018-11-21

## 2018-11-21 RX ORDER — LEVOTHYROXINE SODIUM 137 UG/1
137 TABLET ORAL
Status: DISCONTINUED | OUTPATIENT
Start: 2018-11-22 | End: 2018-11-23 | Stop reason: HOSPADM

## 2018-11-21 RX ORDER — VENLAFAXINE HYDROCHLORIDE 75 MG/1
150 CAPSULE, EXTENDED RELEASE ORAL DAILY
Status: DISCONTINUED | OUTPATIENT
Start: 2018-11-22 | End: 2018-11-23 | Stop reason: HOSPADM

## 2018-11-21 RX ORDER — SODIUM CHLORIDE, SODIUM LACTATE, POTASSIUM CHLORIDE, CALCIUM CHLORIDE 600; 310; 30; 20 MG/100ML; MG/100ML; MG/100ML; MG/100ML
100 INJECTION, SOLUTION INTRAVENOUS CONTINUOUS
Status: DISCONTINUED | OUTPATIENT
Start: 2018-11-21 | End: 2018-11-21 | Stop reason: HOSPADM

## 2018-11-21 RX ORDER — ACETAMINOPHEN 325 MG/1
650 TABLET ORAL ONCE
Status: COMPLETED | OUTPATIENT
Start: 2018-11-21 | End: 2018-11-21

## 2018-11-21 RX ORDER — FENTANYL CITRATE 50 UG/ML
50 INJECTION, SOLUTION INTRAMUSCULAR; INTRAVENOUS
Status: DISCONTINUED | OUTPATIENT
Start: 2018-11-21 | End: 2018-11-21 | Stop reason: HOSPADM

## 2018-11-21 RX ORDER — HYDROCODONE BITARTRATE AND ACETAMINOPHEN 7.5; 325 MG/1; MG/1
1 TABLET ORAL EVERY 4 HOURS PRN
Status: DISCONTINUED | OUTPATIENT
Start: 2018-11-21 | End: 2018-11-23 | Stop reason: HOSPADM

## 2018-11-21 RX ADMIN — BUPIVACAINE HYDROCHLORIDE 60 ML: 2.5 INJECTION, SOLUTION EPIDURAL; INFILTRATION; INTRACAUDAL; PERINEURAL at 12:02

## 2018-11-21 RX ADMIN — PANTOPRAZOLE SODIUM 40 MG: 40 INJECTION, POWDER, FOR SOLUTION INTRAVENOUS at 09:59

## 2018-11-21 RX ADMIN — PROPOFOL 20 MCG/KG/MIN: 10 INJECTION, EMULSION INTRAVENOUS at 11:48

## 2018-11-21 RX ADMIN — ESMOLOL HYDROCHLORIDE 10 MG: 10 INJECTION INTRAVENOUS at 12:17

## 2018-11-21 RX ADMIN — SODIUM CHLORIDE, POTASSIUM CHLORIDE, SODIUM LACTATE AND CALCIUM CHLORIDE 1000 ML: 600; 310; 30; 20 INJECTION, SOLUTION INTRAVENOUS at 09:50

## 2018-11-21 RX ADMIN — GLYCOPYRROLATE 0.4 MG: 0.2 INJECTION, SOLUTION INTRAMUSCULAR; INTRAVENOUS at 13:29

## 2018-11-21 RX ADMIN — MIDAZOLAM HYDROCHLORIDE 2 MG: 1 INJECTION, SOLUTION INTRAMUSCULAR; INTRAVENOUS at 11:42

## 2018-11-21 RX ADMIN — HYDROCODONE BITARTRATE AND ACETAMINOPHEN 1 TABLET: 7.5; 325 TABLET ORAL at 21:44

## 2018-11-21 RX ADMIN — CHLORHEXIDINE GLUCONATE 15 ML: 1.2 RINSE ORAL at 09:59

## 2018-11-21 RX ADMIN — SIMETHICONE CHEW TAB 80 MG 80 MG: 80 TABLET ORAL at 21:44

## 2018-11-21 RX ADMIN — ACETAMINOPHEN 650 MG: 325 TABLET ORAL at 09:59

## 2018-11-21 RX ADMIN — BUPRENORPHINE HYDROCHLORIDE 0.3 MG: 0.32 INJECTION INTRAMUSCULAR; INTRAVENOUS at 12:02

## 2018-11-21 RX ADMIN — SCOPALAMINE 1 PATCH: 1 PATCH, EXTENDED RELEASE TRANSDERMAL at 09:59

## 2018-11-21 RX ADMIN — FENTANYL CITRATE 50 MCG: 50 INJECTION, SOLUTION INTRAMUSCULAR; INTRAVENOUS at 14:49

## 2018-11-21 RX ADMIN — ATRACURIUM BESYLATE 10 MG: 10 INJECTION, SOLUTION INTRAVENOUS at 13:05

## 2018-11-21 RX ADMIN — LEVOFLOXACIN 500 MG: 5 INJECTION, SOLUTION INTRAVENOUS at 11:42

## 2018-11-21 RX ADMIN — DEXAMETHASONE SODIUM PHOSPHATE 4 MG: 10 INJECTION INTRAMUSCULAR; INTRAVENOUS at 12:05

## 2018-11-21 RX ADMIN — LIDOCAINE HYDROCHLORIDE 50 MG: 20 INJECTION, SOLUTION INFILTRATION; PERINEURAL at 11:47

## 2018-11-21 RX ADMIN — NEOSTIGMINE METHYLSULFATE 4 MG: 1 INJECTION, SOLUTION INTRAVENOUS at 13:29

## 2018-11-21 RX ADMIN — ONDANSETRON 4 MG: 2 INJECTION INTRAMUSCULAR; INTRAVENOUS at 13:22

## 2018-11-21 RX ADMIN — DEXAMETHASONE SODIUM PHOSPHATE 4 MG: 10 INJECTION, SOLUTION INTRAMUSCULAR; INTRAVENOUS at 12:02

## 2018-11-21 RX ADMIN — SODIUM CHLORIDE, POTASSIUM CHLORIDE, SODIUM LACTATE AND CALCIUM CHLORIDE 100 ML/HR: 600; 310; 30; 20 INJECTION, SOLUTION INTRAVENOUS at 10:55

## 2018-11-21 RX ADMIN — EPHEDRINE SULFATE 12.5 MG: 50 INJECTION INTRAMUSCULAR; INTRAVENOUS; SUBCUTANEOUS at 12:27

## 2018-11-21 RX ADMIN — EPHEDRINE SULFATE 12.5 MG: 50 INJECTION INTRAMUSCULAR; INTRAVENOUS; SUBCUTANEOUS at 12:29

## 2018-11-21 RX ADMIN — ROCURONIUM BROMIDE 40 MG: 10 SOLUTION INTRAVENOUS at 11:48

## 2018-11-21 RX ADMIN — SODIUM CHLORIDE, SODIUM LACTATE, POTASSIUM CHLORIDE, CALCIUM CHLORIDE 150 ML/HR: 600; 310; 30; 20 INJECTION, SOLUTION INTRAVENOUS at 14:54

## 2018-11-21 RX ADMIN — PROPOFOL 200 MG: 10 INJECTION, EMULSION INTRAVENOUS at 11:48

## 2018-11-21 RX ADMIN — LIDOCAINE HYDROCHLORIDE 0.5 ML: 10 INJECTION, SOLUTION EPIDURAL; INFILTRATION; INTRACAUDAL; PERINEURAL at 09:50

## 2018-11-21 RX ADMIN — PHENOL 2 SPRAY: 1.5 LIQUID ORAL at 21:44

## 2018-11-21 RX ADMIN — ROCURONIUM BROMIDE 10 MG: 10 SOLUTION INTRAVENOUS at 12:37

## 2018-11-21 NOTE — ANESTHESIA PREPROCEDURE EVALUATION
Anesthesia Evaluation                  Airway   Mallampati: I  TM distance: >3 FB  Neck ROM: full  No difficulty expected  Dental      Pulmonary    (+) asthma, sleep apnea,   Cardiovascular     ECG reviewed    (+) hypertension, valvular problems/murmurs, hyperlipidemia,       Neuro/Psych  (+) numbness, psychiatric history,     GI/Hepatic/Renal/Endo    (+) morbid obesity, GERD,  hypothyroidism,     Musculoskeletal     Abdominal    Substance History      OB/GYN          Other                        Anesthesia Plan    ASA 3     general   (Tap)  intravenous induction   Anesthetic plan, all risks, benefits, and alternatives have been provided, discussed and informed consent has been obtained with: patient.    Plan discussed with CRNA.

## 2018-11-21 NOTE — ANESTHESIA POSTPROCEDURE EVALUATION
Patient: Connie Lu    Procedure Summary     Date:  11/21/18 Room / Location:   [facility] /  [facility]    Anesthesia Start:  1142 Anesthesia Stop:      Procedures:       GASTRIC SLEEVE LAPAROSCOPIC (N/A Abdomen)      CHOLECYSTECTOMY LAPAROSCOPIC (N/A Abdomen)      HIATAL HERNIA REPAIR LAPAROSCOPIC (N/A Abdomen)      ESOPHAGOGASTRODUODENOSCOPY (N/A Esophagus)      CHOLECYSTECTOMY LAPAROSCOPIC (N/A Abdomen) Diagnosis:       Obesity, Class III, BMI 40-49.9 (morbid obesity) (CMS/[facility])      Chronic cholecystitis with calculus      Hiatal hernia with gastroesophageal reflux      (Obesity, Class III, BMI 40-49.9 (morbid obesity) (CMS/[facility]) [E66.01])    Surgeon:  Maksim Jhaveri MD Provider:  Karson Patel MD    Anesthesia Type:  general ASA Status:  3          Anesthesia Type: general  Last vitals  BP   130/45   Temp   97.7   Pulse   70   Resp   18     SpO2   94%     Post Anesthesia Care and Evaluation    Patient location during evaluation: PACU  Patient participation: complete - patient participated  Level of consciousness: awake  Pain score: 0  Pain management: adequate  Airway patency: patent  Anesthetic complications: No anesthetic complications  PONV Status: none  Cardiovascular status: acceptable and stable  Respiratory status: nasal cannula, unassisted, acceptable and spontaneous ventilation  Hydration status: acceptable

## 2018-11-21 NOTE — ANESTHESIA PROCEDURE NOTES
ANESTHESIA INTUBATION  Urgency: elective    Airway not difficult    General Information and Staff    Patient location during procedure: OR    Indications and Patient Condition  Indications for airway management: airway protection    Preoxygenated: yes  Mask difficulty assessment: 1 - vent by mask    Final Airway Details  Final airway type: endotracheal airway      Successful airway: ETT  Cuffed: yes   Successful intubation technique: direct laryngoscopy  Facilitating devices/methods: intubating stylet  Endotracheal tube insertion site: oral  Blade: Joyce  Blade size: 2  ETT size (mm): 7.0  Cormack-Lehane Classification: grade I - full view of glottis  Placement verified by: chest auscultation and capnometry   Measured from: lips  ETT to lips (cm): 21  Number of attempts at approach: 1

## 2018-11-21 NOTE — OP NOTE
Preoperative Diagnosis:   Morbid Obesity with Multiple Co-Morbidities                                                                         Chronic cholecystitis w cholelithiasis                                                                         Hiatal hernia with gastroesophageal reflux                                                                                       Disease    Postoperative Diagnosis:   Same    Procedure:                                                      Laparoscopic Sleeve Gastrectomy (85% subtotal vertical gastrectomy) over a 36 [place] Bougie Dilator                                                                        Laparoscopic cholecystectomy                                                                        Laparoscopic hiatal hernia repair                                                                        EGD    Surgeon:                                                       GUIDO Jhaveri MD    Anesthesia:                                                   GETASHLYN    EBL:                                                              Minimal    Fluids:                                                           Crystalloid    Specimens:                                                   Subtotal gastrectomy, GB    Drains:                                                           None    Counts:                                                          Correct    Complications:                                               None    Indications:   This is a 63-year-old morbidly obese white female who presents for elective laparoscopic sleeve gastrectomy and  concomitant lap lalit and lap hiatal hernia repair.  She's undergone our extensive preoperative education teaching and consent process everything's in order and she wishes to proceed.    Operative technique:     The patient was brought to the operating room, and placed supine upon the operating room table.  SCD hose were  placed, she underwent uneventful general endotracheal anesthesia per the anesthesiology staff, she received IV Levaquin and subcutaneous Lovenox, the anesthesiology staff performed a tap block, and her abdomen was prepped and draped with ChloraPrep in a sterile fashion, an Ioban was used as well, a Cary catheter was not placed.    The peritoneal cavity was entered in the upper abdomen to the left of midline using an 11 mm trocar and an Optiview technique and the abdomen was insufflated to a pressure of 15 mmHg with CO2 gas.  Exploratory laparoscopy revealed no evidence of injury from the entrance technique, a normal appearing liver, a normal appearing GB fundus, no other abnormalities noted.  Remaining trocars were placed under direct visualization including an additional 11 mm trocar in the left mid abdomen, 5 mm trochars in the right upper quadrant and left subcostal position, and in the upper abdomen to the right of midline a 15 mm trocar was placed.  Through a stab incision in the epigastrium a Luiza retractor was used to elevate the left lobe of the liver exposing the hiatus.  There was not a visible hiatal hernia from the anterior view and this was photodocumented.  Beginning approximately two thirds of the way around the greater curvature the stomach, the gastrocolic vessels were divided with the Ligasure device.  This proceeded proximally taking down all the short gastric vessels and exposing the left william.  The hernia was obvious from the posterior view.  There was not a paraesophageal hernia.  Noted was a splenic infarct involving the proximal spleen, photodocumented.  The hernia sac was incised along the base of the left william and this was extended up and across the phrenoesophageal membrane.  The pars flaccida was divided.  There was not a replaced hepatic vessel.  The hernia sac was incised along the base of the right william and this was extended up and across the phrenoesophageal membrane.  The  hernia sac and it's contents including some posterior excess lipomatous tissue were dissected out of the mediastinum and reduced below the level of the crura.  No paraesophageal contents.  The GE junction was then lengthened to well below the level of the crura by dissecting areolar tissue well into the mediastinum.  A 1/2 inch penrose drain was used temporarily for esophageal retraction.  The anterior and posterior vagus nerves were preserved.  The crura were dissected to their meeting point inferiorly.  The hiatal hernia repair was performed posteriorly with 2 interrupted 0-silk suture with good result, photodocumentation obtained before and after repair.  Gastrocolic vessels were then divided medially to a couple cm proximal to the pylorus.  Some of the filmy attachments of the posterior stomach to the pancreas and retroperitoneum were divided.  The anesthesiology staff passed a 36 Spanish blunt tip bougie dilator which was manipulated along the lesser curvature into the distal antrum.  The 85% subtotal vertical sleeve gastrectomy was then performed over the 36 Spanish bougie dilator using a Medtronic electric linear articulating stapler with attached dual absorbable strips.  The first firing was a 60 mm black load, the next firing was a 45 mm purple load, the next 3 firings were purple loads.  After clamping down the final load and prior to firing it the bougie dilator was removed.  The sleeve was performed such that it was uniform in size, no hourglassing or narrowing, especially at the angularis, and the final firing was done a centimeter away from the angle of His to hopefully avoid incorporating esophageal fibers.  The subtotal gastrectomy specimen was placed into a large retrieval bag and withdrawn and placed on a separate Nuñez stand, it was a smaller than average size specimen.     Attention was directed toward cholecystectomy.  The Luiza was removed and the left lateral 5 mm trocar moved up to this  incision.  The gallbladder was visualized, appeared normal, eggshell blue, no adhesions to surrounding structures.  It was too distended to grasp and a small fundic cholecystotomy made, brown bile, no stones or sludge seen.  The gallbladder was elevated over the liver and the neck of the gallbladder was retracted laterally.  Blunt dissection was used to circumferentially dissect the neck of the gallbladder and the cystic duct and artery. The cystic duct was of small caliber and had no palpable stones.  Dissection in the triangle of Calot revealed the csytic artery entering onto the surface of the gallbladder.  The cystic duct and artery were divided between two 5 mm clips on the patient side, one on the gallbladder side.  Peritoneal attachments of the gallbladder to the liver bed were divided. The gallbladder was placed in to an Endopouch and withdrawn through the 15 mm trocar site incision. I palpated the gallbladder through the bag, several pea sized stones appreciated, no masses, and it was sent o/w unopened to pathology for permanent section.  Jennifer marcial maneuvers given and hemostasis of the gallbladder bed fossa was excellent.     The Luiza was replaced and the 5 mm trocar returned to the left lateral position.    The sleeve was submerged under saline.  Upper endoscopy was performed, and the endoscope was advanced into the duodenal bulb.  Bile in the duodenum.  No air bubbles or leak seen, no active bleeding at the staple line, no narrowing at the angularis, no pyloric spasm or deformity, no gastritis, no hiatal hernia or Naranjo's esophagus, and the endoscope was withdrawn.  The subtotal gastrectomy specimen was inspected and it was sent to pathology for permanent section.  Irrigation fluid was suctioned free.  The sleeve was resting nicely and hemostatic as was the GB bed fossa.  The lateral superior sleeve was tacked to the the diaphragm lateral to the left william using a 2-0 vicryl plus suture.  The  greater curvature of the sleeve lateral to the angularis was tacked to the omentum with a figure of 8 2-0 vicryl plus suture.  The sleeve staple line and GB bed fossa were treated with a total of 4 cc of aerosolized Tisseal fibrin glue.  Photodocumentation of the sleeve was obtained.  The Luiza retractor was removed.  Fascia at the 15 mm trocar site incision was closed with a horizontal mattress 0 Vicryl suture placed with a suture passer under direct visualization and tying the knot extracorporeally.  Remaining trocars were removed under direct visualization, no bleeding noted from their sites.  Subcutaneous tissue in the 15 mm incision was closed with a figure-of-eight 2-0 Vicryl plus suture, and skin in each incision was closed using 3-0 Monocryl plus in an interrupted subcuticular stitch followed by skin-a-fix.  The patient tolerated the procedure well without complication, was taken to the recovery room in stable condition.

## 2018-11-21 NOTE — ANESTHESIA PROCEDURE NOTES
Peripheral Block      Patient location during procedure: OR  Reason for block: at surgeon's request and post-op pain management  Performed by  Anesthesiologist: Oliver Londono MD  Preanesthetic Checklist  Completed: patient identified, site marked, surgical consent, pre-op evaluation, timeout performed, IV checked, risks and benefits discussed and monitors and equipment checked  Prep:  Pt Position: supine  Sterile barriers:cap, gloves, sterile barriers and mask  Prep: ChloraPrep  Patient monitoring: blood pressure monitoring, continuous pulse oximetry and EKG  Procedure  Sedation:yes  Performed under: general  Guidance:ultrasound guided  Images:still images obtained    Laterality:Bilateral  Block Type:TAP  Injection Technique:single-shot  Needle Type:short-bevel and echogenic  Needle Gauge:20 G    Medications Used: buprenorphine (BUPRENEX) injection, 0.3 mg  dexamethasone sodium phosphate injection, 4 mg  bupivacaine PF (MARCAINE) 0.25 % injection, 60 mL  Medications  Comment:Block Injection:  LA dose divided between Right and Left block       Adjuncts:  Decadron 4mg PSF, Buprenex 0.3mg (Per total volume of LA)    Post Assessment  Injection Assessment: negative aspiration for heme, incremental injection and no paresthesia on injection  Patient Tolerance:comfortable throughout block  Complications:no  Additional Notes      Under Ultrasound guidance, a BBraun 4inch 360 degree needle was advanced with Normal Saline hydro dissection of tissue.  The Internal Oblique and Transversus Abdominus muscles where visualized.  At or before the aponeurosis of Internal Oblique, local anesthetic spread was visualized in the Transversus Abdominus Plane. Injection was made incrementally with aspiration every 5 mls.  There was no  intravascular injection,  injection pressure was normal, there was no neural injection, and the procedure was completed without difficulty.  Thank You.

## 2018-11-21 NOTE — BRIEF OP NOTE
GASTRIC SLEEVE LAPAROSCOPIC, CHOLECYSTECTOMY LAPAROSCOPIC, HIATAL HERNIA REPAIR LAPAROSCOPIC, ESOPHAGOGASTRODUODENOSCOPY  Progress Note    Connie Lu  11/21/2018    Pre-op Diagnosis:   Obesity, Class III, BMI 40-49.9 (morbid obesity) (CMS/HCC) [E66.01]       Post-Op Diagnosis Codes:     * Obesity, Class III, BMI 40-49.9 (morbid obesity) (CMS/[facility]) [E66.01]     * Chronic cholecystitis with calculus [K80.10]     * Hiatal hernia with gastroesophageal reflux [K21.9, K44.9]    Procedure/CPT® Codes:  NC LAP, DENICE RESTRICT PROC, LONGITUDINAL GASTRECTOMY [53155]  NC LAP,CHOLECYSTECTOMY [49912]  NC LAP,ESOPHAGOGAST FUNDOPLASTY [27073]  NC ESOPHAGOGASTRODUODENOSCOPY TRANSORAL DIAGNOSTIC [40489]    Procedure(s):  GASTRIC SLEEVE LAPAROSCOPIC  CHOLECYSTECTOMY LAPAROSCOPIC  HIATAL HERNIA REPAIR LAPAROSCOPIC  ESOPHAGOGASTRODUODENOSCOPY    Surgeon(s):  Maksim Jhaveri MD Weiss, George Derek, MD    Anesthesia: General with Block    Staff:   Circulator: Ruth Ann Moffett RN; Michaela Patel RN  Scrub Person: Lars Mcduffie; Luciana Carnes  Nursing Assistant: Nan Morales; Faye Thompson    Estimated Blood Loss: 20 mL    Urine Voided: * No values recorded between 11/21/2018 11:42 AM and 11/21/2018  1:34 PM *    Specimens:                ID Type Source Tests Collected by Time   A : GALLBLADDER Tissue Gallbladder TISSUE PATHOLOGY EXAM Maksim Jhaveri MD 11/21/2018 1301   B : SUB-TOTAL GASTRECTOMY Tissue Stomach TISSUE PATHOLOGY EXAM Maksim Jhaveri MD 11/21/2018 1301         Drains:   [REMOVED] NG/OG Tube Orogastric 18 Fr Center mouth (Removed)       Findings:     Complications: none      Maksim Jhaveri MD     Date: 11/21/2018  Time: 1:34 PM

## 2018-11-21 NOTE — PLAN OF CARE
Problem: Bariatric Surgery (Adult,Pediatric)  Goal: Signs and Symptoms of Listed Potential Problems Will be Absent, Minimized or Managed (Bariatric Surgery)  Outcome: Ongoing (interventions implemented as appropriate)   11/21/18 2615   Goal/Outcome Evaluation   Problems Assessed (Bariatric Surgery) all   Problems Present (Bariatric Surgery) pain

## 2018-11-22 ENCOUNTER — APPOINTMENT (OUTPATIENT)
Dept: GENERAL RADIOLOGY | Facility: HOSPITAL | Age: 63
End: 2018-11-22

## 2018-11-22 LAB
ALBUMIN SERPL-MCNC: 3.74 G/DL (ref 3.2–4.8)
ALBUMIN/GLOB SERPL: 1.7 G/DL (ref 1.5–2.5)
ALP SERPL-CCNC: 62 U/L (ref 25–100)
ALT SERPL W P-5'-P-CCNC: 81 U/L (ref 7–40)
ANION GAP SERPL CALCULATED.3IONS-SCNC: 9 MMOL/L (ref 3–11)
AST SERPL-CCNC: 96 U/L (ref 0–33)
BASOPHILS # BLD AUTO: 0 10*3/MM3 (ref 0–0.2)
BASOPHILS NFR BLD AUTO: 0 % (ref 0–1)
BILIRUB SERPL-MCNC: 0.4 MG/DL (ref 0.3–1.2)
BUN BLD-MCNC: 16 MG/DL (ref 9–23)
BUN/CREAT SERPL: 26.2 (ref 7–25)
CALCIUM SPEC-SCNC: 8.7 MG/DL (ref 8.7–10.4)
CHLORIDE SERPL-SCNC: 99 MMOL/L (ref 99–109)
CO2 SERPL-SCNC: 28 MMOL/L (ref 20–31)
CREAT BLD-MCNC: 0.61 MG/DL (ref 0.6–1.3)
DEPRECATED RDW RBC AUTO: 50.2 FL (ref 37–54)
EOSINOPHIL # BLD AUTO: 0.01 10*3/MM3 (ref 0–0.3)
EOSINOPHIL NFR BLD AUTO: 0.1 % (ref 0–3)
ERYTHROCYTE [DISTWIDTH] IN BLOOD BY AUTOMATED COUNT: 15.7 % (ref 11.3–14.5)
GFR SERPL CREATININE-BSD FRML MDRD: 100 ML/MIN/1.73
GLOBULIN UR ELPH-MCNC: 2.2 GM/DL
GLUCOSE BLD-MCNC: 118 MG/DL (ref 70–100)
HCT VFR BLD AUTO: 41.9 % (ref 34.5–44)
HGB BLD-MCNC: 14.2 G/DL (ref 11.5–15.5)
IMM GRANULOCYTES # BLD: 0.03 10*3/MM3 (ref 0–0.03)
IMM GRANULOCYTES NFR BLD: 0.3 % (ref 0–0.6)
IRON 24H UR-MRATE: 23 MCG/DL (ref 50–175)
LYMPHOCYTES # BLD AUTO: 1.56 10*3/MM3 (ref 0.6–4.8)
LYMPHOCYTES NFR BLD AUTO: 13 % (ref 24–44)
MCH RBC QN AUTO: 29.3 PG (ref 27–31)
MCHC RBC AUTO-ENTMCNC: 33.9 G/DL (ref 32–36)
MCV RBC AUTO: 86.4 FL (ref 80–99)
MONOCYTES # BLD AUTO: 0.96 10*3/MM3 (ref 0–1)
MONOCYTES NFR BLD AUTO: 8 % (ref 0–12)
NEUTROPHILS # BLD AUTO: 9.41 10*3/MM3 (ref 1.5–8.3)
NEUTROPHILS NFR BLD AUTO: 78.6 % (ref 41–71)
PLAT MORPH BLD: NORMAL
PLATELET # BLD AUTO: 244 10*3/MM3 (ref 150–450)
PMV BLD AUTO: 10.7 FL (ref 6–12)
POTASSIUM BLD-SCNC: 3.9 MMOL/L (ref 3.5–5.5)
PROT SERPL-MCNC: 5.9 G/DL (ref 5.7–8.2)
RBC # BLD AUTO: 4.85 10*6/MM3 (ref 3.89–5.14)
RBC MORPH BLD: NORMAL
SODIUM BLD-SCNC: 136 MMOL/L (ref 132–146)
WBC MORPH BLD: NORMAL
WBC NRBC COR # BLD: 11.97 10*3/MM3 (ref 3.5–10.8)

## 2018-11-22 PROCEDURE — 25010000002 THIAMINE PER 100 MG: Performed by: SURGERY

## 2018-11-22 PROCEDURE — 83540 ASSAY OF IRON: CPT | Performed by: SURGERY

## 2018-11-22 PROCEDURE — 74241: CPT

## 2018-11-22 PROCEDURE — 85007 BL SMEAR W/DIFF WBC COUNT: CPT | Performed by: SURGERY

## 2018-11-22 PROCEDURE — 99024 POSTOP FOLLOW-UP VISIT: CPT | Performed by: SURGERY

## 2018-11-22 PROCEDURE — 94799 UNLISTED PULMONARY SVC/PX: CPT

## 2018-11-22 PROCEDURE — 0 DIATRIZOATE MEGLUMINE & SODIUM PER 1 ML: Performed by: SURGERY

## 2018-11-22 PROCEDURE — 80053 COMPREHEN METABOLIC PANEL: CPT | Performed by: SURGERY

## 2018-11-22 PROCEDURE — 25010000002 ENOXAPARIN PER 10 MG: Performed by: SURGERY

## 2018-11-22 PROCEDURE — 25010000002 NA FERRIC GLUC CPLX PER 12.5 MG: Performed by: SURGERY

## 2018-11-22 PROCEDURE — 94660 CPAP INITIATION&MGMT: CPT

## 2018-11-22 PROCEDURE — 85025 COMPLETE CBC W/AUTO DIFF WBC: CPT | Performed by: SURGERY

## 2018-11-22 PROCEDURE — 25010000002 CYANOCOBALAMIN PER 1000 MCG: Performed by: SURGERY

## 2018-11-22 RX ADMIN — LOSARTAN POTASSIUM 100 MG: 50 TABLET ORAL at 08:00

## 2018-11-22 RX ADMIN — ONDANSETRON 4 MG: 4 TABLET, FILM COATED ORAL at 05:18

## 2018-11-22 RX ADMIN — SIMETHICONE CHEW TAB 80 MG 80 MG: 80 TABLET ORAL at 08:01

## 2018-11-22 RX ADMIN — HYDROCODONE BITARTRATE AND ACETAMINOPHEN 1 TABLET: 7.5; 325 TABLET ORAL at 05:13

## 2018-11-22 RX ADMIN — HYDROCODONE BITARTRATE AND ACETAMINOPHEN 1 TABLET: 7.5; 325 TABLET ORAL at 20:32

## 2018-11-22 RX ADMIN — ONDANSETRON 4 MG: 4 TABLET, FILM COATED ORAL at 20:32

## 2018-11-22 RX ADMIN — ENOXAPARIN SODIUM 40 MG: 40 INJECTION SUBCUTANEOUS at 11:12

## 2018-11-22 RX ADMIN — SODIUM CHLORIDE 125 MG: 9 INJECTION, SOLUTION INTRAVENOUS at 12:19

## 2018-11-22 RX ADMIN — LEVOTHYROXINE SODIUM 137 MCG: 137 TABLET ORAL at 05:13

## 2018-11-22 RX ADMIN — PANTOPRAZOLE SODIUM 40 MG: 40 INJECTION, POWDER, FOR SOLUTION INTRAVENOUS at 05:13

## 2018-11-22 RX ADMIN — HYDROCODONE BITARTRATE AND ACETAMINOPHEN 1 TABLET: 7.5; 325 TABLET ORAL at 11:12

## 2018-11-22 RX ADMIN — VENLAFAXINE HYDROCHLORIDE 150 MG: 75 CAPSULE, EXTENDED RELEASE ORAL at 08:01

## 2018-11-22 RX ADMIN — CLONIDINE HYDROCHLORIDE 0.1 MG: 0.1 TABLET ORAL at 12:19

## 2018-11-22 RX ADMIN — POTASSIUM CHLORIDE AND SODIUM CHLORIDE 125 ML/HR: 450; 150 INJECTION, SOLUTION INTRAVENOUS at 08:01

## 2018-11-22 RX ADMIN — THIAMINE HYDROCHLORIDE 250 ML/HR: 100 INJECTION, SOLUTION INTRAMUSCULAR; INTRAVENOUS at 08:01

## 2018-11-22 RX ADMIN — DIATRIZOATE MEGLUMINE AND DIATRIZOATE SODIUM 100 ML: 660; 100 LIQUID ORAL; RECTAL at 11:50

## 2018-11-22 RX ADMIN — CYANOCOBALAMIN 1000 MCG: 1000 INJECTION, SOLUTION INTRAMUSCULAR; SUBCUTANEOUS at 08:01

## 2018-11-22 NOTE — PROGRESS NOTES
"Bariatric Surgery Progress Note:      Chief Complaint:  POD #1    Subjective     Interval History:  Doing well.  Complaining of some dysphagia, epigastric discomfort, but pain is controlled.  Denies N/V.  Getting less than 2 oz liquid per hour.  No fevers.  Ambulating.  Voiding.  IS [id number] reported.  No flatus.      Objective     Vital Signs  Blood pressure 155/71, pulse 73, temperature 98.5 °F (36.9 °C), temperature source Oral, resp. rate 14, height 151.1 cm (59.5\"), weight 103 kg (226 lb), SpO2 97 %.      Intake/Output Summary (Last 24 hours) at 11/22/2018 1823  Last data filed at 11/22/2018 0400  Gross per 24 hour   Intake --   Output 900 ml   Net -900 ml       Physical Exam:  General: Alert, NAD  Lungs: Clear  Heart: RRR  Abdomen: soft, appropriate, incisions okay, scant BS  Extremities: warm, (+) SCDs       Labs:  Lab Results (last 24 hours)     Procedure Component Value Units Date/Time    CBC & Differential [[id number]] Collected:  11/22/18 0631    Specimen:  Blood Updated:  11/22/18 0747    Narrative:       The following orders were created for panel order CBC & Differential.  Procedure                               Abnormality         Status                     ---------                               -----------         ------                     Scan Slide[[id number]]                   Normal              Final result               CBC Auto Differential[[id number]]        Abnormal            Final result                 Please view results for these tests on the individual orders.    CBC Auto Differential [[id number]]  (Abnormal) Collected:  11/22/18 0631    Specimen:  Blood Updated:  11/22/18 0747     WBC 11.97 10*3/mm3      RBC 4.85 10*6/mm3      Hemoglobin 14.2 g/dL      Hematocrit 41.9 %      MCV 86.4 fL      MCH 29.3 pg      MCHC 33.9 g/dL      RDW 15.7 %      RDW-SD 50.2 fl      MPV 10.7 fL      Platelets 244 10*3/mm3      Neutrophil % 78.6 %      Lymphocyte % 13.0 %      Monocyte % 8.0 %      " Eosinophil % 0.1 %      Basophil % 0.0 %      Immature Grans % 0.3 %      Neutrophils, Absolute 9.41 10*3/mm3      Lymphocytes, Absolute 1.56 10*3/mm3      Monocytes, Absolute 0.96 10*3/mm3      Eosinophils, Absolute 0.01 10*3/mm3      Basophils, Absolute 0.00 10*3/mm3      Immature Grans, Absolute 0.03 10*3/mm3     Scan Slide [[id number]]  (Normal) Collected:  11/22/18 0631    Specimen:  Blood Updated:  11/22/18 0747     RBC Morphology Normal     WBC Morphology Normal     Platelet Morphology Normal    Iron [[id number]]  (Abnormal) Collected:  11/22/18 0631    Specimen:  Blood Updated:  11/22/18 0709     Iron 23 mcg/dL     Comprehensive Metabolic Panel [[id number]]  (Abnormal) Collected:  11/22/18 0631    Specimen:  Blood Updated:  11/22/18 0709     Glucose 118 mg/dL      BUN 16 mg/dL      Creatinine 0.61 mg/dL      Sodium 136 mmol/L      Potassium 3.9 mmol/L      Chloride 99 mmol/L      CO2 28.0 mmol/L      Calcium 8.7 mg/dL      Total Protein 5.9 g/dL      Albumin 3.74 g/dL      ALT (SGPT) 81 U/L      AST (SGOT) 96 U/L      Alkaline Phosphatase 62 U/L      Total Bilirubin 0.4 mg/dL      eGFR Non African Amer 100 mL/min/1.73      Globulin 2.2 gm/dL      A/G Ratio 1.7 g/dL      BUN/Creatinine Ratio 26.2     Anion Gap 9.0 mmol/L     Narrative:       [employer] Guidelines    Stage     Description        GFR  1         Normal or High     90+  2         Mild decrease      60-89  3         Moderate decrease  30-59  4         Severe decrease    15-29  5         Kidney failure     <15    The MDRD GFR formula is only valid for adults with stable renal function between ages 18 and 70.            Assessment/Plan     POD # 1 s/p LSG/lap lalit/HHR.    Doing well.  Work on oral intake.  Wean off O2.  Low iron without evidence of bleeding on Lovenox, received Iv iron.  Slight elevation in AST/ALT, likely due to liver retraction.  Continue OOB/ PT/ DVT prophx.        11/22/18  6:23 PM  Janel Boyer MD

## 2018-11-22 NOTE — PLAN OF CARE
Problem: Patient Care Overview  Goal: Plan of Care Review  Outcome: Ongoing (interventions implemented as appropriate)   11/22/18 5308   Coping/Psychosocial   Plan of Care Reviewed With patient   Plan of Care Review   Progress improving   OTHER   Outcome Summary Pt ambulating without difficulty, performing IS well, drinking her protein and has experienced no n/v.

## 2018-11-22 NOTE — PLAN OF CARE
Problem: Patient Care Overview  Goal: Plan of Care Review  Outcome: Ongoing (interventions implemented as appropriate)   11/22/18 0318   Coping/Psychosocial   Plan of Care Reviewed With patient   Plan of Care Review   Progress improving   OTHER   Outcome Summary Pt doing very well this shift. No nausea/vomiting reported. Ambulating well. Will continue to monitor.      Goal: Interprofessional Rounds/Family Conf  Outcome: Ongoing (interventions implemented as appropriate)      Problem: Bariatric Surgery (Adult,Pediatric)  Goal: Signs and Symptoms of Listed Potential Problems Will be Absent, Minimized or Managed (Bariatric Surgery)  Outcome: Ongoing (interventions implemented as appropriate)    Goal: Anesthesia/Sedation Recovery  Outcome: Outcome(s) achieved Date Met: 11/22/18

## 2018-11-23 VITALS
HEART RATE: 83 BPM | BODY MASS INDEX: 44.37 KG/M2 | DIASTOLIC BLOOD PRESSURE: 82 MMHG | TEMPERATURE: 97.8 F | HEIGHT: 60 IN | WEIGHT: 226 LBS | SYSTOLIC BLOOD PRESSURE: 139 MMHG | OXYGEN SATURATION: 91 % | RESPIRATION RATE: 16 BRPM

## 2018-11-23 LAB
ALBUMIN SERPL-MCNC: 3.44 G/DL (ref 3.2–4.8)
ALBUMIN/GLOB SERPL: 1.8 G/DL (ref 1.5–2.5)
ALP SERPL-CCNC: 60 U/L (ref 25–100)
ALT SERPL W P-5'-P-CCNC: 63 U/L (ref 7–40)
ANION GAP SERPL CALCULATED.3IONS-SCNC: 4 MMOL/L (ref 3–11)
AST SERPL-CCNC: 60 U/L (ref 0–33)
BASOPHILS # BLD AUTO: 0.01 10*3/MM3 (ref 0–0.2)
BASOPHILS NFR BLD AUTO: 0.1 % (ref 0–1)
BILIRUB SERPL-MCNC: 0.5 MG/DL (ref 0.3–1.2)
BUN BLD-MCNC: 13 MG/DL (ref 9–23)
BUN/CREAT SERPL: 20 (ref 7–25)
CALCIUM SPEC-SCNC: 8.3 MG/DL (ref 8.7–10.4)
CHLORIDE SERPL-SCNC: 102 MMOL/L (ref 99–109)
CO2 SERPL-SCNC: 33 MMOL/L (ref 20–31)
CREAT BLD-MCNC: 0.65 MG/DL (ref 0.6–1.3)
CYTO UR: NORMAL
DEPRECATED RDW RBC AUTO: 54.1 FL (ref 37–54)
EOSINOPHIL # BLD AUTO: 0.04 10*3/MM3 (ref 0–0.3)
EOSINOPHIL NFR BLD AUTO: 0.3 % (ref 0–3)
ERYTHROCYTE [DISTWIDTH] IN BLOOD BY AUTOMATED COUNT: 16.4 % (ref 11.3–14.5)
GFR SERPL CREATININE-BSD FRML MDRD: 93 ML/MIN/1.73
GLOBULIN UR ELPH-MCNC: 1.9 GM/DL
GLUCOSE BLD-MCNC: 90 MG/DL (ref 70–100)
HCT VFR BLD AUTO: 41.3 % (ref 34.5–44)
HGB BLD-MCNC: 13.1 G/DL (ref 11.5–15.5)
IMM GRANULOCYTES # BLD: 0.04 10*3/MM3 (ref 0–0.03)
IMM GRANULOCYTES NFR BLD: 0.3 % (ref 0–0.6)
LAB AP CASE REPORT: NORMAL
LAB AP CLINICAL INFORMATION: NORMAL
LYMPHOCYTES # BLD AUTO: 2.62 10*3/MM3 (ref 0.6–4.8)
LYMPHOCYTES NFR BLD AUTO: 18.4 % (ref 24–44)
MCH RBC QN AUTO: 28.5 PG (ref 27–31)
MCHC RBC AUTO-ENTMCNC: 31.7 G/DL (ref 32–36)
MCV RBC AUTO: 90 FL (ref 80–99)
MONOCYTES # BLD AUTO: 1.11 10*3/MM3 (ref 0–1)
MONOCYTES NFR BLD AUTO: 7.8 % (ref 0–12)
NEUTROPHILS # BLD AUTO: 10.45 10*3/MM3 (ref 1.5–8.3)
NEUTROPHILS NFR BLD AUTO: 73.4 % (ref 41–71)
PATH REPORT.FINAL DX SPEC: NORMAL
PATH REPORT.GROSS SPEC: NORMAL
PLATELET # BLD AUTO: 205 10*3/MM3 (ref 150–450)
PMV BLD AUTO: 10.3 FL (ref 6–12)
POTASSIUM BLD-SCNC: 4.2 MMOL/L (ref 3.5–5.5)
PROT SERPL-MCNC: 5.3 G/DL (ref 5.7–8.2)
RBC # BLD AUTO: 4.59 10*6/MM3 (ref 3.89–5.14)
SODIUM BLD-SCNC: 139 MMOL/L (ref 132–146)
WBC NRBC COR # BLD: 14.23 10*3/MM3 (ref 3.5–10.8)

## 2018-11-23 PROCEDURE — 99024 POSTOP FOLLOW-UP VISIT: CPT | Performed by: SURGERY

## 2018-11-23 PROCEDURE — 25010000002 ENOXAPARIN PER 10 MG: Performed by: SURGERY

## 2018-11-23 PROCEDURE — 80053 COMPREHEN METABOLIC PANEL: CPT | Performed by: SURGERY

## 2018-11-23 PROCEDURE — 85025 COMPLETE CBC W/AUTO DIFF WBC: CPT | Performed by: SURGERY

## 2018-11-23 RX ORDER — LANSOPRAZOLE 30 MG/1
30 CAPSULE, DELAYED RELEASE ORAL DAILY
Qty: 60 CAPSULE | Refills: 0 | Status: SHIPPED | OUTPATIENT
Start: 2018-11-23 | End: 2019-02-08 | Stop reason: SDUPTHER

## 2018-11-23 RX ORDER — HYDROCODONE BITARTRATE AND ACETAMINOPHEN 7.5; 325 MG/1; MG/1
1 TABLET ORAL EVERY 6 HOURS PRN
Qty: 30 TABLET | Refills: 0 | Status: SHIPPED | OUTPATIENT
Start: 2018-11-23 | End: 2018-12-12

## 2018-11-23 RX ADMIN — ENOXAPARIN SODIUM 40 MG: 40 INJECTION SUBCUTANEOUS at 09:48

## 2018-11-23 RX ADMIN — PANTOPRAZOLE SODIUM 40 MG: 40 INJECTION, POWDER, FOR SOLUTION INTRAVENOUS at 05:21

## 2018-11-23 RX ADMIN — HYDROCODONE BITARTRATE AND ACETAMINOPHEN 1 TABLET: 7.5; 325 TABLET ORAL at 15:05

## 2018-11-23 RX ADMIN — HYDROCODONE BITARTRATE AND ACETAMINOPHEN 1 TABLET: 7.5; 325 TABLET ORAL at 09:49

## 2018-11-23 RX ADMIN — LEVOTHYROXINE SODIUM 137 MCG: 137 TABLET ORAL at 05:26

## 2018-11-23 RX ADMIN — VENLAFAXINE HYDROCHLORIDE 150 MG: 75 CAPSULE, EXTENDED RELEASE ORAL at 09:49

## 2018-11-23 RX ADMIN — PHENOL 2 SPRAY: 1.5 LIQUID ORAL at 09:48

## 2018-11-23 RX ADMIN — HYDROCODONE BITARTRATE AND ACETAMINOPHEN 1 TABLET: 7.5; 325 TABLET ORAL at 05:20

## 2018-11-23 RX ADMIN — LOSARTAN POTASSIUM 100 MG: 50 TABLET ORAL at 09:49

## 2018-11-23 NOTE — PLAN OF CARE
Problem: Patient Care Overview  Goal: Plan of Care Review  Outcome: Ongoing (interventions implemented as appropriate)   11/23/18 0300   Coping/Psychosocial   Plan of Care Reviewed With patient   Plan of Care Review   Progress improving     Goal: Interprofessional Rounds/Family Conf  Outcome: Ongoing (interventions implemented as appropriate)      Problem: Bariatric Surgery (Adult,Pediatric)  Goal: Signs and Symptoms of Listed Potential Problems Will be Absent, Minimized or Managed (Bariatric Surgery)  Outcome: Ongoing (interventions implemented as appropriate)

## 2018-11-23 NOTE — PROGRESS NOTES
"Bariatric Surgery     LOS: 2 days   Patient Care Team:  Masoud Martin MD as PCP - General (Family Medicine)    Chief Complaint:  POD #2    Subjective     Interval History:  Doing well.  No complaints.  Tolerating PO. Denies N/V.  No fevers.  Pain controlled.  Ambulating.  Voiding.  IS [id number].  Feels ready to go home.  Has some fullness in ears--completed Z pack a few days ago for URI/otitis media.  Cough productive of small amounts of mucus after she does her incentive spirometry.  No O2 requirement, no dyspnea.    Objective     Vital Signs  Blood pressure 139/82, pulse 83, temperature 97.8 °F (36.6 °C), temperature source Oral, resp. rate 16, height 151.1 cm (59.5\"), weight 103 kg (226 lb), SpO2 91 %.    Physical Exam:  General: Alert, NAD  Lungs: Clear  Heart: RRR  Abdomen: soft, appropriate, incisions okay, scant BS  Extremities: warm, (+) SCDs     Results Review:     I reviewed the patient's new clinical results.    Labs:  Lab Results (last 24 hours)     Procedure Component Value Units Date/Time    Tissue Pathology Exam [[id number]] Collected:  11/21/18 1301    Specimen:  Tissue from Gallbladder, Tissue from Stomach Updated:  11/23/18 1355     Case Report --     Surgical Pathology Report                         Case: [id number]                                  Authorizing Provider:  Maksim Jhaveri MD    Collected:           11/21/2018 01:01 PM          Ordering Location:     [facility]   Received:            11/21/2018 01:54 PM                                 OR                                                                           Pathologist:           Richie Arias DO                                                        Specimens:   1) - Gallbladder, GALLBLADDER                                                                       2) - Stomach, SUB-TOTAL GASTRECTOMY                                                         Clinical Information --     The working history is " morbid obesity.        Final Diagnosis --      1. GALLBLADDER:  Cholelithiasis and chronic cholecystitis with reactive epithelial changes and focal intestinal metaplasia.  Negative for dysplasia or malignancy.   2. STOMACH, SUBTOTAL GASTRECTOMY:  Portion of stomach comprised of gastric body-type mucosa with no diagnostic abnormality.  Negative for Helicobacter pylori, intestinal metaplasia, dysplasia, or malignancy.    JTA/dlb       Gross Description --     Specimen 1 received in formalin labeled as gallbladder is a 9.0 x 3.5 x 2.5 cm intact, unopened gallbladder including 0.2 cm of attached cystic duct. The serosa is tan/pink and wrinkled with adherent fat. The lumen contains a moderate amount of yellow/green viscid bile and multiple yellow/brown multifaceted choleliths averaging 1.8 cm in greatest dimension. The mucosa is tan/pink and velvety and the wall thickness averages 0.2 cm. No masses are appreciated. Representative sections to include the cystic duct margin, neck, body and fundus are submitted in one cassette.    Specimen 2 received in formalin labeled as subtotal gastrectomy is a 20.0 x 4.5 x 4.0 cm intact, unopened tubular portion of stomach with a single intact staple line along one side. The serosa is red/pink and smooth with a minimal amount of attached fat. The lumen contains a very minimal amount of bloody fluid and the mucosa is red/pink and congested with normal, prominent rugae folds. No masses, polyps, or ulcers are identified. Representative sections are submitted in cassettes A-C. [email or web address]            Microscopic Description --     The slides are reviewed and demonstrate histopathologic features supporting the above rendered diagnosis.          Comprehensive Metabolic Panel [[id number]]  (Abnormal) Collected:  11/23/18 0825    Specimen:  Blood Updated:  11/23/18 0853     Glucose 90 mg/dL      BUN 13 mg/dL      Creatinine 0.65 mg/dL      Sodium 139 mmol/L      Potassium 4.2 mmol/L      Chloride  102 mmol/L      CO2 33.0 mmol/L      Calcium 8.3 mg/dL      Total Protein 5.3 g/dL      Albumin 3.44 g/dL      ALT (SGPT) 63 U/L      AST (SGOT) 60 U/L      Alkaline Phosphatase 60 U/L      Total Bilirubin 0.5 mg/dL      eGFR Non African Amer 93 mL/min/1.73      Globulin 1.9 gm/dL      A/G Ratio 1.8 g/dL      BUN/Creatinine Ratio 20.0     Anion Gap 4.0 mmol/L     Narrative:       National [employer] Guidelines    Stage     Description        GFR  1         Normal or High     90+  2         Mild decrease      60-89  3         Moderate decrease  30-59  4         Severe decrease    15-29  5         Kidney failure     <15    The MDRD GFR formula is only valid for adults with stable renal function between ages 18 and 70.    CBC & Differential [[id number]] Collected:  11/23/18 0825    Specimen:  Blood Updated:  11/23/18 0835    Narrative:       The following orders were created for panel order CBC & Differential.  Procedure                               Abnormality         Status                     ---------                               -----------         ------                     CBC Auto Differential[[id number]]        Abnormal            Final result                 Please view results for these tests on the individual orders.    CBC Auto Differential [[id number]]  (Abnormal) Collected:  11/23/18 0825    Specimen:  Blood Updated:  11/23/18 0835     WBC 14.23 10*3/mm3      RBC 4.59 10*6/mm3      Hemoglobin 13.1 g/dL      Hematocrit 41.3 %      MCV 90.0 fL      MCH 28.5 pg      MCHC 31.7 g/dL      RDW 16.4 %      RDW-SD 54.1 fl      MPV 10.3 fL      Platelets 205 10*3/mm3      Neutrophil % 73.4 %      Lymphocyte % 18.4 %      Monocyte % 7.8 %      Eosinophil % 0.3 %      Basophil % 0.1 %      Immature Grans % 0.3 %      Neutrophils, Absolute 10.45 10*3/mm3      Lymphocytes, Absolute 2.62 10*3/mm3      Monocytes, Absolute 1.11 10*3/mm3      Eosinophils, Absolute 0.04 10*3/mm3      Basophils, Absolute 0.01 10*3/mm3       Immature Grans, Absolute 0.04 10*3/mm3           Imaging:  Imaging Results (last 24 hours)     ** No results found for the last 24 hours. **            Assessment/Plan     POD # 2 s/p LSG/lap lalit/HHR.    Doing well.  Tolerating fluids well today.  Hgb dropped 1 gram--no tachycardia, suspect dilutional.   Patient feels well and has met discharge criteria.  Will discharge home with follow up in 1 week with PA.  Rx given for Lortab, PPi.  Declined Zofran because she already has some at home.  Re: fullness in ears, suspect serous otitis after URI/otitis media last week.  D/w PCP, but reminded no steroid shots or pills.   Discharge instructions reviewed with patient and all questions answered.          Janel Boyer MD  11/23/18  3:49 PM

## 2018-11-24 ENCOUNTER — READMISSION MANAGEMENT (OUTPATIENT)
Dept: CALL CENTER | Facility: HOSPITAL | Age: 63
End: 2018-11-24

## 2018-11-25 NOTE — OUTREACH NOTE
Prep Survey      Responses   Facility patient discharged from?  [place]   Is patient eligible?  Yes   Discharge diagnosis  Gastric bypass   Does the patient have one of the following disease processes/diagnoses(primary or secondary)?  General Surgery   Does the patient have Home health ordered?  No   Is there a DME ordered?  No   Prep survey completed?  Yes          Sherley Parson RN

## 2018-11-26 ENCOUNTER — READMISSION MANAGEMENT (OUTPATIENT)
Dept: CALL CENTER | Facility: HOSPITAL | Age: 63
End: 2018-11-26

## 2018-11-26 NOTE — OUTREACH NOTE
General Surgery Week 1 Survey      Responses   Facility patient discharged from?  [facility]   Does the patient have one of the following disease processes/diagnoses(primary or secondary)?  General Surgery   Is there a successful TCM telephone encounter documented?  No   Week 1 attempt successful?  Yes   Call start time  1231   Call end time  1238   Discharge diagnosis  Gastric bypass   Meds reviewed with patient/caregiver?  Yes   Is the patient having any side effects they believe may be caused by any medication additions or changes?  No   Does the patient have all medications related to this admission filled (includes all antibiotics, pain medications, etc.)  Yes   Is the patient taking all medications as directed (includes completed medication regime)?  Yes   Does the patient have a follow up appointment scheduled with their surgeon?  Yes   Has the patient kept scheduled appointments due by today?  N/A   Has home health visited the patient within 72 hours of discharge?  N/A   Psychosocial issues?  No   Comments  pt states tolerating PRO liquid diet except difficult to drink quota of water, feels full after PRO, hard to take in water   Did the patient receive a copy of their discharge instructions?  Yes   Nursing interventions  Reviewed instructions with patient   What is the patient's perception of their health status since discharge?  Improving   Nursing interventions  Nurse provided patient education   Is the patient /caregiver able to teach back basic post-op care?  Continue use of incentive spirometry at least 1 week post discharge, Practice 'cough and deep breath', Drive as instructed by MD in discharge instructions, Take showers only when approved by MD-sponge bathe until then, No tub bath, swimming, or hot tub until instructed by MD, Keep incision areas clean,dry and protected, Lifting as instructed by MD in discharge instructions   Is the patient/caregiver able to teach back signs and symptoms of  incisional infection?  Increased redness, swelling or pain at the incisonal site, Increased drainage or bleeding, Incisional warmth, Pus or odor from incision, Fever   Is the patient/caregiver able to teach back steps to recovery at home?  Set small, achievable goals for return to baseline health, Eat a well-balance diet   Is the patient/caregiver able to teach back the hierarchy of who to call/visit for symptoms/problems? PCP, Specialist, Home health nurse, Urgent Care, ED, 911  Yes   Week 1 call completed?  Yes          Anabelle Camacho RN

## 2018-11-28 ENCOUNTER — OFFICE VISIT (OUTPATIENT)
Dept: BARIATRICS/WEIGHT MGMT | Facility: CLINIC | Age: 63
End: 2018-11-28

## 2018-11-28 ENCOUNTER — TELEPHONE (OUTPATIENT)
Dept: FAMILY MEDICINE CLINIC | Facility: CLINIC | Age: 63
End: 2018-11-28

## 2018-11-28 VITALS
DIASTOLIC BLOOD PRESSURE: 78 MMHG | HEART RATE: 111 BPM | RESPIRATION RATE: 18 BRPM | OXYGEN SATURATION: 99 % | BODY MASS INDEX: 41.72 KG/M2 | TEMPERATURE: 97.7 F | SYSTOLIC BLOOD PRESSURE: 124 MMHG | WEIGHT: 212.5 LBS | HEIGHT: 60 IN

## 2018-11-28 DIAGNOSIS — Z98.84 S/P BARIATRIC SURGERY: Primary | ICD-10-CM

## 2018-11-28 DIAGNOSIS — B96.89 BACTERIAL EAR INFECTION, BILATERAL: Primary | ICD-10-CM

## 2018-11-28 DIAGNOSIS — I10 ESSENTIAL HYPERTENSION: ICD-10-CM

## 2018-11-28 DIAGNOSIS — H66.93 BACTERIAL EAR INFECTION, BILATERAL: Primary | ICD-10-CM

## 2018-11-28 DIAGNOSIS — R53.83 FATIGUE, UNSPECIFIED TYPE: ICD-10-CM

## 2018-11-28 DIAGNOSIS — E66.01 OBESITY, CLASS III, BMI 40-49.9 (MORBID OBESITY) (HCC): ICD-10-CM

## 2018-11-28 PROBLEM — E66.813 CLASS 3 SEVERE OBESITY DUE TO EXCESS CALORIES WITHOUT SERIOUS COMORBIDITY WITH BODY MASS INDEX (BMI) OF 45.0 TO 49.9 IN ADULT: Status: RESOLVED | Noted: 2018-10-12 | Resolved: 2018-11-28

## 2018-11-28 PROCEDURE — 99024 POSTOP FOLLOW-UP VISIT: CPT | Performed by: PHYSICIAN ASSISTANT

## 2018-11-28 NOTE — TELEPHONE ENCOUNTER
----- Message from Carolee Cary sent at 11/28/2018 10:48 AM EST -----  Contact: DR ALVES  PATIENT WANTS TO BE REFERRED TO ENT BECAUSE SHE STILL HAS HER EAR INFECTION THAT WON'T GO AWAY. PATIENT PREFERS [facility].  [phone]

## 2018-11-29 LAB
ALBUMIN SERPL-MCNC: 4.4 G/DL (ref 3.6–4.8)
ALBUMIN/GLOB SERPL: 1.5 {RATIO} (ref 1.2–2.2)
ALP SERPL-CCNC: 98 IU/L (ref 39–117)
ALT SERPL-CCNC: 38 IU/L (ref 0–32)
AST SERPL-CCNC: 32 IU/L (ref 0–40)
BASOPHILS # BLD AUTO: 0 X10E3/UL (ref 0–0.2)
BASOPHILS NFR BLD AUTO: 0 %
BILIRUB SERPL-MCNC: 0.4 MG/DL (ref 0–1.2)
BUN SERPL-MCNC: 29 MG/DL (ref 8–27)
BUN/CREAT SERPL: 33 (ref 12–28)
CALCIUM SERPL-MCNC: 10.2 MG/DL (ref 8.7–10.3)
CHLORIDE SERPL-SCNC: 96 MMOL/L (ref 96–106)
CO2 SERPL-SCNC: 25 MMOL/L (ref 20–29)
CREAT SERPL-MCNC: 0.87 MG/DL (ref 0.57–1)
EOSINOPHIL # BLD AUTO: 0.4 X10E3/UL (ref 0–0.4)
EOSINOPHIL NFR BLD AUTO: 4 %
ERYTHROCYTE [DISTWIDTH] IN BLOOD BY AUTOMATED COUNT: 16.3 % (ref 12.3–15.4)
GLOBULIN SER CALC-MCNC: 3 G/DL (ref 1.5–4.5)
GLUCOSE SERPL-MCNC: 90 MG/DL (ref 65–99)
HCT VFR BLD AUTO: 44.3 % (ref 34–46.6)
HGB BLD-MCNC: 15 G/DL (ref 11.1–15.9)
IMM GRANULOCYTES # BLD: 0 X10E3/UL (ref 0–0.1)
IMM GRANULOCYTES NFR BLD: 0 %
LYMPHOCYTES # BLD AUTO: 1.7 X10E3/UL (ref 0.7–3.1)
LYMPHOCYTES NFR BLD AUTO: 16 %
MCH RBC QN AUTO: 28.7 PG (ref 26.6–33)
MCHC RBC AUTO-ENTMCNC: 33.9 G/DL (ref 31.5–35.7)
MCV RBC AUTO: 85 FL (ref 79–97)
MONOCYTES # BLD AUTO: 1.1 X10E3/UL (ref 0.1–0.9)
MONOCYTES NFR BLD AUTO: 11 %
NEUTROPHILS # BLD AUTO: 7.2 X10E3/UL (ref 1.4–7)
NEUTROPHILS NFR BLD AUTO: 69 %
PLATELET # BLD AUTO: 403 X10E3/UL (ref 150–379)
POTASSIUM SERPL-SCNC: 5.1 MMOL/L (ref 3.5–5.2)
PROT SERPL-MCNC: 7.4 G/DL (ref 6–8.5)
RBC # BLD AUTO: 5.22 X10E6/UL (ref 3.77–5.28)
SODIUM SERPL-SCNC: 143 MMOL/L (ref 134–144)
WBC # BLD AUTO: 10.4 X10E3/UL (ref 3.4–10.8)

## 2018-12-04 ENCOUNTER — READMISSION MANAGEMENT (OUTPATIENT)
Dept: CALL CENTER | Facility: HOSPITAL | Age: 63
End: 2018-12-04

## 2018-12-04 ENCOUNTER — TELEPHONE (OUTPATIENT)
Dept: BARIATRICS/WEIGHT MGMT | Facility: CLINIC | Age: 63
End: 2018-12-04

## 2018-12-04 NOTE — TELEPHONE ENCOUNTER
Notified pt that we recommend her to f/up on the office she schedule with ezequiel for 12/12. And I let pt know that we advise against diuretics because of risk of dehydration. Pt verbalized understanding.

## 2018-12-04 NOTE — OUTREACH NOTE
General Surgery Week 2 Survey      Responses   Facility patient discharged from?  [facility]   Does the patient have one of the following disease processes/diagnoses(primary or secondary)?  General Surgery   Week 2 attempt successful?  Yes   Call start time  1355   Call end time  1401   Discharge diagnosis  Gastric bypass   Meds reviewed with patient/caregiver?  Yes   Is the patient having any side effects they believe may be caused by any medication additions or changes?  Yes   Side effects comments   Diuretic was d/c'd at discharge and now having fluctuations in weight and low urine output with adequate intake. Has call into doctor.   Does the patient have all medications related to this admission filled (includes all antibiotics, pain medications, etc.)  Yes   Is the patient taking all medications as directed (includes completed medication regime)?  Yes   Does the patient have a follow up appointment scheduled with their surgeon?  Yes   Has the patient kept scheduled appointments due by today?  Yes   Comments  Has ENT appt next week.   Psychosocial issues?  No   Comments  pt states tolerating PRO liquid diet except difficult to drink quota of water, feels full after PRO, hard to take in water   Did the patient receive a copy of their discharge instructions?  Yes   Nursing interventions  Educated on MyChart   What is the patient's perception of their health status since discharge?  New symptoms unrelated to diagnosis   Nursing interventions  Nurse provided patient education   Week 2 call completed?  Yes          Olivia Monaco RN

## 2018-12-04 NOTE — TELEPHONE ENCOUNTER
Pt called in stating that she is not losing any weight, she is actually gaining it. Pt stated that she is eating the proper foods and getting  grams of protein and 64 fluid ounces a day. Pt stated that she does not understand why she can not take her water pill, because she fills like her weight gain/no weight loss is where she is not supposed to take her fluid pill anymore. Pt states shes at a loss. Please advise, thank you.

## 2018-12-12 ENCOUNTER — OFFICE VISIT (OUTPATIENT)
Dept: BARIATRICS/WEIGHT MGMT | Facility: CLINIC | Age: 63
End: 2018-12-12

## 2018-12-12 ENCOUNTER — READMISSION MANAGEMENT (OUTPATIENT)
Dept: CALL CENTER | Facility: HOSPITAL | Age: 63
End: 2018-12-12

## 2018-12-12 VITALS
WEIGHT: 211 LBS | BODY MASS INDEX: 41.43 KG/M2 | OXYGEN SATURATION: 99 % | TEMPERATURE: 97.7 F | RESPIRATION RATE: 18 BRPM | SYSTOLIC BLOOD PRESSURE: 124 MMHG | HEIGHT: 60 IN | HEART RATE: 78 BPM | DIASTOLIC BLOOD PRESSURE: 86 MMHG

## 2018-12-12 DIAGNOSIS — E66.01 OBESITY, CLASS III, BMI 40-49.9 (MORBID OBESITY) (HCC): ICD-10-CM

## 2018-12-12 DIAGNOSIS — Z98.84 STATUS POST BARIATRIC SURGERY: Primary | ICD-10-CM

## 2018-12-12 PROCEDURE — 99024 POSTOP FOLLOW-UP VISIT: CPT | Performed by: PHYSICIAN ASSISTANT

## 2018-12-12 NOTE — PROGRESS NOTES
[facility]  [address]  [facility] [address] [phone]      Patient Name:  Connie Lu.  :  1955      Date of Visit: 2018      Reason for Visit:  POD#21 postop    HPI:  Connie Lu is a 63 y.o. female s/p LSG/HHR/lalit by [facility] on 18    Patient called with concerns of not losing weight and questioning why she cant take fluid pill. Advised follow up vist for discussion.     Says she was worried about not losing weight fast enough- but if we aren't concerned, she's not concerned. Also says she had been on water pill for BP control, feels she wasn't voiding as much as she should be. Since prayer last week, has had increased urination and doing fine. No h/o LE edema, no current LE edema. Used zofran pre-surgery for nausea, has not needed this since surgery. Very rare short lasting heartburn, not enough to complain about.  Seeing ENT Friday for ear issues, constant congestion.  Denies dysphagia, nausea, vomiting, abdominal pain, pulmonary issues and fevers.  Tolerating diet progression - on stage 4.  Getting 75+g prot/day. Eating food at least 2 times a day.  Drinking 64+ fluid oz/day.  Was on water pill for BP control,   Taking MVI, B12, B1, Calcium, Vit D, iron and Vit C.  On prevacid.  Still holding ASA , NSAIDs , Tramadol, Hormones, Diuretics , Steroids and Immunologics.  Getting activity in the house.     Presurgery weight:  226 pounds. Today's weight is 95.7 kg (211 lb) pounds, today's Body mass index is 41.9 kg/m²., and her weight loss since surgery is 15 pounds.       Past Medical History:   Diagnosis Date   • Anxiety    • Asthma     worse with heat, has inhaler for rare need   • Attention deficit disorder    • Boil     states it was not MRSA, once, many years ago   • Breast cancer (CMS/HCC) 2010    estrogen driven. S/p left mastectomy and right reduction. No requirements for radiation or chemo.  5 yrs tamoxifen   •  "Carpal tunnel syndrome    • Chronic cholecystitis with calculus     US stones Has epi pain, nausea   • Constipation     on linzess   • Depression    • Diverticulosis     noted on colonoscopy   • Gallstone    • Heart murmur     Has seen cardiologist 2017, had preop workup for left TKR   • Hiatal hernia with gastroesophageal reflux disease and esophagitis     controlled with esomeprazole.  EGD [facility] 6/18 HH, gr II esophagitis, superf antral ulcerations, 35 cm zline, path anturm neg h. pyl, stool ag 5/18 neg h. pyl.  path DE reflux   • History of blood transfusion     as an infant, unknown season.    • Hypertension    • Hypothyroidism    • Insomnia    • Iron deficiency     on iron supplements   • Joint pain     effexor helps. No NSAIDS, no injections.    • Kidney function abnormal     \"little low\" on previous labs, advised increased hydration   • Lower back pain    • RADHA (obstructive sleep apnea)     Non CPAP compliant   • Polyp of sigmoid colon    • Prediabetes     Hb A1C 6.10 5/18     Past Surgical History:   Procedure Laterality Date   • BREAST SURGERY Right 2010    Reduction   • CHOLECYSTECTOMY N/A 11/21/2018    Procedure: CHOLECYSTECTOMY LAPAROSCOPIC;  Surgeon: Maksim Jhaveri MD;  Location:  [facility];  Service: Bariatric   • CHOLECYSTECTOMY LAPAROSCOPIC N/A 11/21/2018    Performed by Maksim Jhaveri MD at  [facility]   • COLONOSCOPY  2016    diverticulosis, h/p benign polyps   • COSMETIC SURGERY     • ENDOSCOPY     • ENDOSCOPY N/A 11/21/2018    Procedure: ESOPHAGOGASTRODUODENOSCOPY;  Surgeon: Maksim Jhaveri MD;  Location:  [facility];  Service: Bariatric   • ESOPHAGOGASTRODUODENOSCOPY N/A 11/21/2018    Performed by Maksim Jhaveri MD at  [facility]   • GASTRIC SLEEVE LAPAROSCOPIC N/A 11/21/2018    Procedure: GASTRIC SLEEVE LAPAROSCOPIC;  Surgeon: Maksim Jhaveri MD;  Location:  [facility];  Service: Bariatric   • GASTRIC SLEEVE LAPAROSCOPIC N/A 11/21/2018    Performed by Maksim Jhaveri MD at "  [facility]   • HIATAL HERNIA REPAIR N/A 11/21/2018    Procedure: HIATAL HERNIA REPAIR LAPAROSCOPIC;  Surgeon: Maksim Jhaveri MD;  Location:  [facility];  Service: Bariatric   • HIATAL HERNIA REPAIR LAPAROSCOPIC N/A 11/21/2018    Performed by Maksim Jhaveri MD at  [facility]   • MASTECTOMY WITH IMMEDIATE RECONSTRUCTION Left 2010   • REPLACEMENT TOTAL KNEE Left 08/2017    Dr Nova   • RETINAL DETACHMENT REPAIR Left 07/2017   • SKIN BIOPSY     • TONSILLECTOMY AND ADENOIDECTOMY  1960, 1962     Outpatient Medications Marked as Taking for the 12/12/18 encounter (Office Visit) with Emma Cid PA-C   Medication Sig Dispense Refill   • ferrous sulfate 325 (65 FE) MG tablet TAKE ONE TABLET BY MOUTH EVERY DAY WITH BREAKFAST 30 tablet 0   • lansoprazole (PREVACID) 30 MG capsule Take 1 capsule by mouth Daily. 60 capsule 0   • levothyroxine (SYNTHROID, LEVOTHROID) 137 MCG tablet TAKE ONE TABLET BY MOUTH EVERY DAY 30 tablet 1   • losartan (COZAAR) 100 MG tablet Take 1 tablet by mouth Daily. 30 tablet 5   • traZODone (DESYREL) 50 MG tablet Take 1-2 tablets by mouth every night at bedtime. 30 tablet 2   • venlafaxine XR (EFFEXOR-XR) 150 MG 24 hr capsule TAKE ONE CAPSULE BY MOUTH EVERY DAY 30 capsule 3     Allergies   Allergen Reactions   • Cephalexin Hives and Itching     Tolerates PCN fine, tolerates other cephalosporins well.        Social History     Socioeconomic History   • Marital status: Single     Spouse name: Not on file   • Number of children: Not on file   • Years of education: Not on file   • Highest education level: Not on file   Social Needs   • Financial resource strain: Not on file   • Food insecurity - worry: Not on file   • Food insecurity - inability: Not on file   • Transportation needs - medical: Not on file   • Transportation needs - non-medical: Not on file   Occupational History   • Occupation: unemployed   Tobacco Use   • Smoking status: Never Smoker   • Smokeless tobacco: Never Used  "  Substance and Sexual Activity   • Alcohol use: No   • Drug use: No   • Sexual activity: Defer     Comment: no hormones   Other Topics Concern   • Not on file   Social History Narrative    Lives in [place] with mother.  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        /86 (BP Location: Left arm, Patient Position: Sitting, Cuff Size: Large Adult)   Pulse 78   Temp 97.7 °F (36.5 °C) (Temporal)   Resp 18   Ht 151.1 cm (59.5\")   Wt 95.7 kg (211 lb)   SpO2 99%   BMI 41.90 kg/m²     Physical Exam   Constitutional: She is oriented to person, place, and time. She appears well-developed and well-nourished.   HENT:   Head: Normocephalic and atraumatic.   Cardiovascular: Normal rate, regular rhythm and normal heart sounds.   Pulmonary/Chest: Effort normal and breath sounds normal. No stridor. No respiratory distress.   Abdominal: Soft. Bowel sounds are normal. She exhibits no distension. There is no tenderness.   Incisions healing well   Neurological: She is alert and oriented to person, place, and time.   Skin: Skin is warm and dry.   Psychiatric: She has a normal mood and affect. Her behavior is normal. Judgment and thought content normal.         Assessment: POD#21  s/p LSG/HHR/lalit by [facility] on 11/21/18    ICD-10-CM ICD-9-CM   1. Status post bariatric surgery Z98.84 V45.86   2. Obesity, Class III, BMI 40-49.9 (morbid obesity) (CMS/[facility]) E66.01 278.01       Plan:  Doing fine, discussed expectations. Can restart diuretic if PCP recommends at 1 month postop, continue to push fluids 64oz+.  Continue to advance diet per manual.  Increase protein towards 100g/day.  Encouraged good food choices - high protein, low carb. Continue activity,  lifting restrictions lifted.  Continue PPI. Continue to avoid NSAIDS, ASA, tramadol, tobacco x 6 weeks postop, steroids x 8 weeks postop.  RTC as scheduled for 1 month followup,  Call w/ problems/concerns.      "

## 2018-12-12 NOTE — OUTREACH NOTE
General Surgery Week 3 Survey      Responses   Facility patient discharged from?  [facility]   Does the patient have one of the following disease processes/diagnoses(primary or secondary)?  General Surgery   Week 3 attempt successful?  Yes   Call start time  1155   Call end time  1156   Discharge diagnosis  Gastric bypass   Meds reviewed with patient/caregiver?  Yes   Is the patient having any side effects they believe may be caused by any medication additions or changes?  Yes   Does the patient have all medications related to this admission filled (includes all antibiotics, pain medications, etc.)  Yes   Is the patient taking all medications as directed (includes completed medication regime)?  Yes   Does the patient have a follow up appointment scheduled with their surgeon?  Yes   Has the patient kept scheduled appointments due by today?  Yes   Psychosocial issues?  No   Did the patient receive a copy of their discharge instructions?  Yes   What is the patient's perception of their health status since discharge?  Improving   Nursing interventions  Nurse provided patient education   Is the patient/caregiver able to teach back signs and symptoms of incisional infection?  Increased redness, swelling or pain at the incisonal site, Increased drainage or bleeding, Incisional warmth, Pus or odor from incision, Fever   Is the patient/caregiver able to teach back the hierarchy of who to call/visit for symptoms/problems? PCP, Specialist, Home health nurse, Urgent Care, ED, 911  Yes   Week 3 call completed?  Yes          Nicki Mantilla RN

## 2018-12-24 DIAGNOSIS — I10 ESSENTIAL HYPERTENSION: ICD-10-CM

## 2018-12-24 RX ORDER — LOSARTAN POTASSIUM 100 MG/1
TABLET ORAL
Qty: 30 TABLET | Refills: 1 | Status: SHIPPED | OUTPATIENT
Start: 2018-12-24 | End: 2019-01-29 | Stop reason: SDUPTHER

## 2018-12-25 DIAGNOSIS — I10 ESSENTIAL HYPERTENSION: ICD-10-CM

## 2018-12-26 ENCOUNTER — OFFICE VISIT (OUTPATIENT)
Dept: FAMILY MEDICINE CLINIC | Facility: CLINIC | Age: 63
End: 2018-12-26

## 2018-12-26 ENCOUNTER — OFFICE VISIT (OUTPATIENT)
Dept: BARIATRICS/WEIGHT MGMT | Facility: CLINIC | Age: 63
End: 2018-12-26

## 2018-12-26 VITALS
RESPIRATION RATE: 18 BRPM | SYSTOLIC BLOOD PRESSURE: 124 MMHG | TEMPERATURE: 97.8 F | BODY MASS INDEX: 41.03 KG/M2 | OXYGEN SATURATION: 99 % | WEIGHT: 209 LBS | HEART RATE: 74 BPM | DIASTOLIC BLOOD PRESSURE: 72 MMHG | HEIGHT: 60 IN

## 2018-12-26 VITALS
RESPIRATION RATE: 20 BRPM | SYSTOLIC BLOOD PRESSURE: 120 MMHG | TEMPERATURE: 97.8 F | WEIGHT: 211 LBS | HEART RATE: 80 BPM | HEIGHT: 59 IN | DIASTOLIC BLOOD PRESSURE: 80 MMHG | BODY MASS INDEX: 42.54 KG/M2

## 2018-12-26 DIAGNOSIS — R79.0 ABNORMAL BLOOD LEVEL OF IRON: ICD-10-CM

## 2018-12-26 DIAGNOSIS — R53.83 FATIGUE, UNSPECIFIED TYPE: Primary | ICD-10-CM

## 2018-12-26 DIAGNOSIS — E55.9 VITAMIN D DEFICIENCY: ICD-10-CM

## 2018-12-26 DIAGNOSIS — Z98.84 S/P BARIATRIC SURGERY: ICD-10-CM

## 2018-12-26 DIAGNOSIS — J01.00 ACUTE MAXILLARY SINUSITIS, RECURRENCE NOT SPECIFIED: Primary | ICD-10-CM

## 2018-12-26 DIAGNOSIS — E66.01 OBESITY, CLASS III, BMI 40-49.9 (MORBID OBESITY) (HCC): ICD-10-CM

## 2018-12-26 DIAGNOSIS — I10 ESSENTIAL HYPERTENSION: ICD-10-CM

## 2018-12-26 DIAGNOSIS — R10.13 DYSPEPSIA: ICD-10-CM

## 2018-12-26 PROCEDURE — 99213 OFFICE O/P EST LOW 20 MIN: CPT | Performed by: PHYSICIAN ASSISTANT

## 2018-12-26 PROCEDURE — 99024 POSTOP FOLLOW-UP VISIT: CPT | Performed by: PHYSICIAN ASSISTANT

## 2018-12-26 RX ORDER — AZELASTINE 1 MG/ML
2 SPRAY, METERED NASAL 2 TIMES DAILY
Qty: 30 ML | Refills: 12 | Status: SHIPPED | OUTPATIENT
Start: 2018-12-26 | End: 2019-06-05

## 2018-12-26 RX ORDER — LOSARTAN POTASSIUM 100 MG/1
TABLET ORAL
Qty: 30 TABLET | Refills: 0 | OUTPATIENT
Start: 2018-12-26

## 2018-12-26 RX ORDER — AZITHROMYCIN 250 MG/1
TABLET, FILM COATED ORAL
Qty: 6 TABLET | Refills: 0 | Status: SHIPPED | OUTPATIENT
Start: 2018-12-26 | End: 2018-12-26

## 2018-12-26 RX ORDER — LEVOTHYROXINE SODIUM 137 UG/1
TABLET ORAL
Qty: 30 TABLET | Refills: 0 | Status: SHIPPED | OUTPATIENT
Start: 2018-12-26 | End: 2019-01-29

## 2018-12-26 NOTE — PROGRESS NOTES
Marta Lu is a 63 y.o. female.     History of Present Illness   Pt presents with CC of sinus congestion, pressure, drainage, cough X 5 days. No improvement   Has not been taking any antihistamine due to recent allergy testing, however this has been completed. Results pending   No fever, HA, SOB, abdominal pain, chest pain   Recent bariatric surgery, avoiding steroids     The following portions of the patient's history were reviewed and updated as appropriate: allergies, current medications, past family history, past medical history, past social history, past surgical history and problem list.    Review of Systems   Constitutional: Positive for fatigue. Negative for chills, diaphoresis and fever.   HENT: Positive for congestion, postnasal drip, rhinorrhea and sinus pressure. Negative for ear discharge, ear pain, hearing loss, nosebleeds, sneezing and sore throat.    Eyes: Negative.    Respiratory: Positive for cough. Negative for chest tightness, shortness of breath and wheezing.    Cardiovascular: Negative.  Negative for chest pain, palpitations and leg swelling.   Gastrointestinal: Negative for abdominal distention, abdominal pain, anal bleeding, blood in stool, constipation, diarrhea, nausea, rectal pain and vomiting.   Endocrine: Negative.  Negative for cold intolerance, heat intolerance, polydipsia, polyphagia and polyuria.   Genitourinary: Negative.  Negative for difficulty urinating, dysuria, flank pain, frequency, hematuria and urgency.   Musculoskeletal: Negative.  Negative for arthralgias, back pain, gait problem, joint swelling, myalgias, neck pain and neck stiffness.   Skin: Negative.  Negative for color change, pallor, rash and wound.   Allergic/Immunologic: Negative.  Negative for immunocompromised state.   Neurological: Negative for dizziness, syncope, weakness, light-headedness, numbness and headaches.   Hematological: Negative.  Negative for adenopathy. Does not bruise/bleed  "easily.   Psychiatric/Behavioral: Negative.        Objective    Blood pressure 120/80, pulse 80, temperature 97.8 °F (36.6 °C), resp. rate 20, height 151.1 cm (59.49\"), weight 95.7 kg (211 lb).     Physical Exam   Constitutional: She is oriented to person, place, and time. She appears well-developed and well-nourished.   HENT:   Head: Normocephalic and atraumatic.   Right Ear: Tympanic membrane, external ear and ear canal normal.   Left Ear: Tympanic membrane, external ear and ear canal normal.   Nose: Mucosal edema present. Right sinus exhibits no maxillary sinus tenderness and no frontal sinus tenderness. Left sinus exhibits no maxillary sinus tenderness and no frontal sinus tenderness.   Mouth/Throat: Posterior oropharyngeal erythema present. No oropharyngeal exudate or posterior oropharyngeal edema.   Eyes: Conjunctivae and EOM are normal. Pupils are equal, round, and reactive to light.   Neck: Normal range of motion. Neck supple. No tracheal deviation present. No thyromegaly present.   Cardiovascular: Normal rate, regular rhythm and normal heart sounds.   Pulmonary/Chest: Effort normal and breath sounds normal. No respiratory distress. She has no wheezes. She has no rales. She exhibits no tenderness.   Musculoskeletal: Normal range of motion. She exhibits no edema, tenderness or deformity.   Lymphadenopathy:     She has no cervical adenopathy.   Neurological: She is alert and oriented to person, place, and time. She has normal reflexes.   Skin: Skin is warm and dry.   Psychiatric: She has a normal mood and affect. Her behavior is normal. Judgment and thought content normal.   Nursing note and vitals reviewed.      Assessment/Plan   Connie was seen today for sinus congestion / drainage and cough.    Diagnoses and all orders for this visit:    Acute maxillary sinusitis, recurrence not specified  -     azelastine (ASTELIN) 0.1 % nasal spray; 2 sprays into the nostril(s) as directed by provider 2 (Two) Times a " Day. Use in each nostril as directed  -     azithromycin (ZITHROMAX Z-GARY) 250 MG tablet; Take 2 tablets the first day, then 1 tablet daily for 4 days.      Treatment as outlined in plan. F/U INB

## 2018-12-26 NOTE — PROGRESS NOTES
[facility]  [address]  [facility] [address]  [phone]      Patient Name:  Connie Lu.  :  1955      Date of Visit:  2018      Reason for Visit:  5 weeks postop    HPI:  Connie Lu is a 63 y.o. female s/p LSG/HHR/lalit by [facility] on 18    Continues w/ URI/congestion issues.  Says went to doctor again today and was given antihistamine.  Knows she must continue to avoid steroids for 8 weeks postoperatively (until 19).      Tolerating diet progression w/out issue.  Getting 100g prot/day.  Feels she needs to increase her vegetable intake.  Struggling w/ some constipation.  Bowels moving every 2-3 days, but she wishes she would be going more often.  Suspects she needs to start using Colace again, but she stopped b/c it would not fit in her pill sorter.  Also says still needs to work on increasing daily fluid intake.     Taking recommended vitamins now w/out issue.  On daily Prevacid.  Not exercising b/c she prefers to be outdoors, so is limited by the weather.  Has a gazelle at home, but not using.  Asking for advice on how to start exercising.      Presurgery weight: 226 pounds.  Today's weight is 94.8 kg (209 lb) pounds, today's  Body mass index is 41.51 kg/m²., and her weight loss since surgery is 17 pounds.         Past Medical History:   Diagnosis Date   • Anxiety    • Asthma     worse with heat, has inhaler for rare need   • Attention deficit disorder    • Boil     states it was not MRSA, once, many years ago   • Breast cancer (CMS/[facility]) 2010    estrogen driven. S/p left mastectomy and right reduction. No requirements for radiation or chemo.  5 yrs tamoxifen   • Carpal tunnel syndrome    • Chronic cholecystitis with calculus     US stones Has epi pain, nausea   • Constipation     on linzess   • Depression    • Diverticulosis     noted on colonoscopy   • Gallstone    • Heart murmur     Has seen cardiologist 2017,  "had preop workup for left TKR   • Hiatal hernia with gastroesophageal reflux disease and esophagitis     controlled with esomeprazole.  EGD [facility] 6/18 HH, gr II esophagitis, superf antral ulcerations, 35 cm zline, path anturm neg h. pyl, stool ag 5/18 neg h. pyl.  path DE reflux   • History of blood transfusion     as an infant, unknown season.    • Hypertension    • Hypothyroidism    • Insomnia    • Iron deficiency     on iron supplements   • Joint pain     effexor helps. No NSAIDS, no injections.    • Kidney function abnormal     \"little low\" on previous labs, advised increased hydration   • Lower back pain    • RADHA (obstructive sleep apnea)     Non CPAP compliant   • Polyp of sigmoid colon    • Prediabetes     Hb A1C 6.10 5/18     Past Surgical History:   Procedure Laterality Date   • BREAST SURGERY Right 2010    Reduction   • CHOLECYSTECTOMY LAPAROSCOPIC N/A 11/21/2018    Performed by Maksim Jhaveri MD at  [facility]   • COLONOSCOPY  2016    diverticulosis, h/p benign polyps   • COSMETIC SURGERY     • ENDOSCOPY     • ESOPHAGOGASTRODUODENOSCOPY N/A 11/21/2018    Performed by Maksim Jhaveri MD at  [facility]   • GASTRIC SLEEVE LAPAROSCOPIC N/A 11/21/2018    Performed by Maksim Jhaveri MD at  [facility]   • HIATAL HERNIA REPAIR LAPAROSCOPIC N/A 11/21/2018    Performed by Maksim Jhaveri MD at  [facility]   • MASTECTOMY WITH IMMEDIATE RECONSTRUCTION Left 2010   • REPLACEMENT TOTAL KNEE Left 08/2017    Dr Nova   • RETINAL DETACHMENT REPAIR Left 07/2017   • SKIN BIOPSY     • TONSILLECTOMY AND ADENOIDECTOMY  1960, 1962     Outpatient Medications Marked as Taking for the 12/26/18 encounter (Office Visit) with Ashlee Encarnacion PA   Medication Sig Dispense Refill   • azelastine (ASTELIN) 0.1 % nasal spray 2 sprays into the nostril(s) as directed by provider 2 (Two) Times a Day. Use in each nostril as directed 30 mL 12   • ferrous sulfate 325 (65 FE) MG tablet TAKE ONE TABLET BY MOUTH EVERY DAY WITH " "BREAKFAST 30 tablet 0   • lansoprazole (PREVACID) 30 MG capsule Take 1 capsule by mouth Daily. 60 capsule 0   • levothyroxine (SYNTHROID, LEVOTHROID) 137 MCG tablet TAKE ONE TABLET BY MOUTH EVERY DAY 30 tablet 0   • losartan (COZAAR) 100 MG tablet TAKE ONE TABLET BY MOUTH EVERY DAY 30 tablet 1   • traZODone (DESYREL) 50 MG tablet Take 1-2 tablets by mouth every night at bedtime. 30 tablet 2   • venlafaxine XR (EFFEXOR-XR) 150 MG 24 hr capsule TAKE ONE CAPSULE BY MOUTH EVERY DAY 30 capsule 3     Allergies   Allergen Reactions   • Cephalexin Hives and Itching     Tolerates PCN fine, tolerates other cephalosporins well.        Social History     Socioeconomic History   • Marital status: Single     Spouse name: Not on file   • Number of children: Not on file   • Years of education: Not on file   • Highest education level: Not on file   Social Needs   • Financial resource strain: Not on file   • Food insecurity - worry: Not on file   • Food insecurity - inability: Not on file   • Transportation needs - medical: Not on file   • Transportation needs - non-medical: Not on file   Occupational History   • Occupation: unemployed   Tobacco Use   • Smoking status: Never Smoker   • Smokeless tobacco: Never Used   Substance and Sexual Activity   • Alcohol use: No   • Drug use: No   • Sexual activity: Defer     Comment: no hormones   Other Topics Concern   • Not on file   Social History Narrative    Lives in [place] with mother.  Works part times as  for [employer] in [place].        /72 (BP Location: Left arm, Patient Position: Sitting, Cuff Size: Large Adult)   Pulse 74   Temp 97.8 °F (36.6 °C) (Temporal)   Resp 18   Ht 151.1 cm (59.5\")   Wt 94.8 kg (209 lb)   SpO2 99%   BMI 41.51 kg/m²   Physical Exam   Constitutional: She appears well-developed and well-nourished. She is cooperative.   obese   HENT:   Mouth/Throat: Oropharynx is clear and moist and mucous membranes are normal.   Eyes: Conjunctivae are " normal. No scleral icterus.   Cardiovascular: Normal rate.   Pulmonary/Chest: Effort normal.   Abdominal: Soft. Bowel sounds are normal. There is no tenderness.   Incisions healing well   Musculoskeletal: Normal range of motion. She exhibits no edema.   Neurological: She is alert.   Skin: Skin is warm and dry. No rash noted.   Psychiatric: She has a normal mood and affect. Judgment normal.         Assessment:   5 weeks s/p LSG/HHR/lalit by [facility] on 11/21/18    ICD-10-CM ICD-9-CM   1. Fatigue, unspecified type R53.83 780.79   2. Dyspepsia R10.13 536.8   3. Vitamin D deficiency E55.9 268.9   4. Abnormal blood level of iron R79.0 790.6   5. Essential hypertension I10 401.9   6. Obesity, Class III, BMI 40-49.9 (morbid obesity) (CMS/[facility]) E66.01 278.01   7. S/P bariatric surgery Z98.84 V45.86       Plan:  Doing fine. Continue to advance diet per manual.  Continue protein 100g/day.  Increase daily fluid intake.  Increase exercise/activity as tolerated.   Routine labs ordered.  Continue vitamins w adjustments pending lab results.  Continue to avoid ASA/NSAIDs/tobacco x 6 weeks postop, steroids x 8 weeks postop.  Call w/ problems/concerns.    The patient was instructed to follow up in 6 weeks, sooner if needed.

## 2018-12-29 LAB
25(OH)D3+25(OH)D2 SERPL-MCNC: 49.4 NG/ML (ref 30–100)
ALBUMIN SERPL-MCNC: 4.1 G/DL (ref 3.6–4.8)
ALBUMIN/GLOB SERPL: 1.7 {RATIO} (ref 1.2–2.2)
ALP SERPL-CCNC: 75 IU/L (ref 39–117)
ALT SERPL-CCNC: 16 IU/L (ref 0–32)
AST SERPL-CCNC: 21 IU/L (ref 0–40)
BASOPHILS # BLD AUTO: 0 X10E3/UL (ref 0–0.2)
BASOPHILS NFR BLD AUTO: 1 %
BILIRUB SERPL-MCNC: 0.2 MG/DL (ref 0–1.2)
BUN SERPL-MCNC: 25 MG/DL (ref 8–27)
BUN/CREAT SERPL: 38 (ref 12–28)
CALCIUM SERPL-MCNC: 9.4 MG/DL (ref 8.7–10.3)
CHLORIDE SERPL-SCNC: 107 MMOL/L (ref 96–106)
CO2 SERPL-SCNC: 25 MMOL/L (ref 20–29)
CREAT SERPL-MCNC: 0.65 MG/DL (ref 0.57–1)
EOSINOPHIL # BLD AUTO: 0.4 X10E3/UL (ref 0–0.4)
EOSINOPHIL NFR BLD AUTO: 4 %
ERYTHROCYTE [DISTWIDTH] IN BLOOD BY AUTOMATED COUNT: 15.8 % (ref 12.3–15.4)
FERRITIN SERPL-MCNC: 78 NG/ML (ref 15–150)
FOLATE SERPL-MCNC: 12.7 NG/ML
GLOBULIN SER CALC-MCNC: 2.4 G/DL (ref 1.5–4.5)
GLUCOSE SERPL-MCNC: 88 MG/DL (ref 65–99)
HCT VFR BLD AUTO: 38.6 % (ref 34–46.6)
HGB BLD-MCNC: 13.6 G/DL (ref 11.1–15.9)
IMM GRANULOCYTES # BLD: 0 X10E3/UL (ref 0–0.1)
IMM GRANULOCYTES NFR BLD: 0 %
IRON SERPL-MCNC: 41 UG/DL (ref 27–139)
LYMPHOCYTES # BLD AUTO: 2.2 X10E3/UL (ref 0.7–3.1)
LYMPHOCYTES NFR BLD AUTO: 27 %
Lab: NORMAL
MCH RBC QN AUTO: 29.1 PG (ref 26.6–33)
MCHC RBC AUTO-ENTMCNC: 35.2 G/DL (ref 31.5–35.7)
MCV RBC AUTO: 83 FL (ref 79–97)
METHYLMALONATE SERPL-SCNC: 258 NMOL/L (ref 0–378)
MONOCYTES # BLD AUTO: 1 X10E3/UL (ref 0.1–0.9)
MONOCYTES NFR BLD AUTO: 12 %
NEUTROPHILS # BLD AUTO: 4.5 X10E3/UL (ref 1.4–7)
NEUTROPHILS NFR BLD AUTO: 56 %
PLATELET # BLD AUTO: 247 X10E3/UL (ref 150–379)
POTASSIUM SERPL-SCNC: 4.7 MMOL/L (ref 3.5–5.2)
PREALB SERPL-MCNC: 17 MG/DL (ref 10–36)
PROT SERPL-MCNC: 6.5 G/DL (ref 6–8.5)
RBC # BLD AUTO: 4.68 X10E6/UL (ref 3.77–5.28)
SODIUM SERPL-SCNC: 145 MMOL/L (ref 134–144)
VIT B1 BLD-SCNC: 238.9 NMOL/L (ref 66.5–200)
WBC # BLD AUTO: 8 X10E3/UL (ref 3.4–10.8)

## 2019-01-08 DIAGNOSIS — F51.01 PRIMARY INSOMNIA: ICD-10-CM

## 2019-01-08 RX ORDER — TRAZODONE HYDROCHLORIDE 50 MG/1
TABLET ORAL
Qty: 30 TABLET | Refills: 2 | Status: SHIPPED | OUTPATIENT
Start: 2019-01-08 | End: 2019-03-22 | Stop reason: SDUPTHER

## 2019-01-24 ENCOUNTER — TELEPHONE (OUTPATIENT)
Dept: FAMILY MEDICINE CLINIC | Facility: CLINIC | Age: 64
End: 2019-01-24

## 2019-01-24 RX ORDER — LEVOTHYROXINE SODIUM 137 UG/1
TABLET ORAL
Qty: 30 TABLET | Refills: 2 | Status: SHIPPED | OUTPATIENT
Start: 2019-01-24 | End: 2019-01-29 | Stop reason: SDUPTHER

## 2019-01-24 NOTE — TELEPHONE ENCOUNTER
----- Message from Carolee Cary sent at 1/24/2019  3:12 PM EST -----  Contact: DR ALVES MED REFILL  MED REFILL ON ferrous sulfate 325 (65 FE) MG tablet TO [facility].  [phone]

## 2019-01-29 ENCOUNTER — OFFICE VISIT (OUTPATIENT)
Dept: FAMILY MEDICINE CLINIC | Facility: CLINIC | Age: 64
End: 2019-01-29

## 2019-01-29 VITALS
DIASTOLIC BLOOD PRESSURE: 80 MMHG | SYSTOLIC BLOOD PRESSURE: 120 MMHG | HEIGHT: 60 IN | BODY MASS INDEX: 39.66 KG/M2 | TEMPERATURE: 97.9 F | WEIGHT: 202 LBS | RESPIRATION RATE: 18 BRPM | HEART RATE: 78 BPM

## 2019-01-29 DIAGNOSIS — E61.1 IRON DEFICIENCY: ICD-10-CM

## 2019-01-29 DIAGNOSIS — I10 ESSENTIAL HYPERTENSION: Primary | ICD-10-CM

## 2019-01-29 DIAGNOSIS — R51.9 SINUS HEADACHE: ICD-10-CM

## 2019-01-29 DIAGNOSIS — E03.9 ACQUIRED HYPOTHYROIDISM: ICD-10-CM

## 2019-01-29 DIAGNOSIS — F41.9 ANXIETY: ICD-10-CM

## 2019-01-29 PROCEDURE — 99214 OFFICE O/P EST MOD 30 MIN: CPT | Performed by: FAMILY MEDICINE

## 2019-01-29 RX ORDER — LEVOTHYROXINE SODIUM 137 UG/1
137 TABLET ORAL DAILY
Qty: 30 TABLET | Refills: 5 | Status: SHIPPED | OUTPATIENT
Start: 2019-01-29 | End: 2019-04-09 | Stop reason: SDUPTHER

## 2019-01-29 RX ORDER — FERROUS SULFATE 325(65) MG
1 TABLET ORAL
Qty: 30 TABLET | Refills: 5 | Status: SHIPPED | OUTPATIENT
Start: 2019-01-29 | End: 2019-07-24 | Stop reason: SDUPTHER

## 2019-01-29 RX ORDER — LOSARTAN POTASSIUM 100 MG/1
100 TABLET ORAL DAILY
Qty: 30 TABLET | Refills: 5 | Status: SHIPPED | OUTPATIENT
Start: 2019-01-29 | End: 2019-04-09 | Stop reason: SDUPTHER

## 2019-01-29 RX ORDER — VENLAFAXINE HYDROCHLORIDE 150 MG/1
150 CAPSULE, EXTENDED RELEASE ORAL DAILY
Qty: 30 CAPSULE | Refills: 5 | Status: SHIPPED | OUTPATIENT
Start: 2019-01-29 | End: 2019-04-09 | Stop reason: SDUPTHER

## 2019-01-29 NOTE — PROGRESS NOTES
Assessment/Plan       Problems Addressed this Visit        Cardiovascular and Mediastinum    Hypertension - Primary    Relevant Medications    losartan (COZAAR) 100 MG tablet       Digestive    Iron deficiency    Relevant Medications    ferrous sulfate 325 (65 FE) MG tablet       Endocrine    Hypothyroidism    Relevant Medications    levothyroxine (SYNTHROID, LEVOTHROID) 137 MCG tablet    Other Relevant Orders    TSH       Other    Anxiety    Relevant Medications    venlafaxine XR (EFFEXOR-XR) 150 MG 24 hr capsule      Other Visit Diagnoses     Sinus headache                Follow up: Return in about 6 months (around 7/29/2019).     DISCUSSION  Refilled medications as noted.  Recent blood work reviewed.    Hypothyroidism.  Continue current dose of medication and recheck TSH in February when she sees bariatric.    Hypertension.  Stable.  Continue medication.    Anxiety.  Stable on Effexor.    Iron deficiency with recent gastric sleeve surgery.  Recent iron level is at the lower end of normal.  Continue iron.    Sinus headache with weather changes.  May try Mucinex.  Has side effects with Sudafed.  Hopefully as this cold from passes through, her head will improve.  She will call if it does not.    Unclear cause for her near falls.  Continue to monitor.    MEDICATIONS PRESCRIBED  Requested Prescriptions     Signed Prescriptions Disp Refills   • levothyroxine (SYNTHROID, LEVOTHROID) 137 MCG tablet 30 tablet 5     Sig: Take 1 tablet by mouth Daily.   • losartan (COZAAR) 100 MG tablet 30 tablet 5     Sig: Take 1 tablet by mouth Daily.   • venlafaxine XR (EFFEXOR-XR) 150 MG 24 hr capsule 30 capsule 5     Sig: Take 1 capsule by mouth Daily.   • ferrous sulfate 325 (65 FE) MG tablet 30 tablet 5     Sig: Take 1 tablet by mouth Daily With Breakfast.            -------------------------------------------    Subjective     Chief Complaint   Patient presents with   • Hypertension     f/u, med refill check dosage may need top  lower BP    • Hyperlipidemia     iron refill          Hypertension   This is a chronic problem. The current episode started more than 1 year ago. The problem is unchanged. The problem is controlled. Associated symptoms include headaches (sinus headaches. ). Pertinent negatives include no chest pain or shortness of breath. There are no associated agents to hypertension. Current antihypertension treatment includes angiotensin blockers. The current treatment provides moderate improvement. There are no compliance problems.  There is no history of angina, kidney disease or CAD/MI. There is no history of chronic renal disease.   Hypothyroidism   This is a chronic problem. The current episode started more than 1 year ago. The problem occurs daily. Associated symptoms include fatigue (today due to headache) and headaches (sinus headaches. ). Pertinent negatives include no chest pain, congestion or coughing. Treatments tried: levothyroxine. The treatment provided moderate relief.         Near fall  Occ near falls and chantelle and stumble for a few months  Occurs once per day  Not dizzy feeling  Vision (cataract, h/o retinal detachment)      Sinus pressure  Today and headache  No fevers  Headache in the center (eyes and under eyes)  Sl nasal drainage but not much  Has had in the past with weather changes    Neck pain   Saw chiropractor  Had gastric sleeve  Able to take that now  Has not tried ibuprofen        Has lost 37 pounds May 2018 . Had gastric sleeve 11.2018    Social History     Tobacco Use   Smoking Status Never Smoker   Smokeless Tobacco Never Used        Past Medical History,Medications, Allergies, and social history was reviewed.      Review of Systems   Constitutional: Positive for fatigue (today due to headache).   HENT: Negative for congestion.    Respiratory: Negative.  Negative for cough and shortness of breath.    Cardiovascular: Negative.  Negative for chest pain.   Gastrointestinal: Negative.    Neurological:  "Positive for headache.   Psychiatric/Behavioral: Negative.        Objective     Vitals:    01/29/19 1422   BP: 120/80   Pulse: 78   Resp: 18   Temp: 97.9 °F (36.6 °C)   Weight: 91.6 kg (202 lb)   Height: 151.1 cm (59.5\")          Physical Exam   Constitutional: She appears well-developed and well-nourished.   HENT:   Head: Normocephalic and atraumatic.   Right Ear: Hearing, tympanic membrane, external ear and ear canal normal.   Left Ear: Hearing, tympanic membrane, external ear and ear canal normal.   Mouth/Throat: Oropharynx is clear and moist.   Eyes: Conjunctivae and EOM are normal. Pupils are equal, round, and reactive to light.   Neck: Normal range of motion. Neck supple. No thyromegaly present.   Cardiovascular: Normal rate, regular rhythm and normal heart sounds. Exam reveals no gallop and no friction rub.   No murmur heard.  Pulmonary/Chest: Effort normal and breath sounds normal. No respiratory distress. She has no wheezes. She has no rales.   Abdominal: Soft. Bowel sounds are normal. She exhibits no distension. There is no tenderness. There is no rebound and no guarding.   Musculoskeletal: She exhibits no edema.   Neurological: She is alert.   Skin: Skin is warm and dry.   Psychiatric: She has a normal mood and affect.   Nursing note and vitals reviewed.  No focal neurologic deficits.  No focal weakness noted on exam and ambulates normally.              Masoud Martin MD    "

## 2019-02-08 DIAGNOSIS — K21.9 GASTROESOPHAGEAL REFLUX DISEASE WITHOUT ESOPHAGITIS: Primary | ICD-10-CM

## 2019-02-08 RX ORDER — LANSOPRAZOLE 30 MG/1
30 CAPSULE, DELAYED RELEASE ORAL DAILY
Qty: 60 CAPSULE | Refills: 0 | Status: SHIPPED | OUTPATIENT
Start: 2019-02-08 | End: 2019-02-20

## 2019-02-20 ENCOUNTER — OFFICE VISIT (OUTPATIENT)
Dept: BARIATRICS/WEIGHT MGMT | Facility: CLINIC | Age: 64
End: 2019-02-20

## 2019-02-20 VITALS
WEIGHT: 195.31 LBS | HEART RATE: 72 BPM | SYSTOLIC BLOOD PRESSURE: 130 MMHG | DIASTOLIC BLOOD PRESSURE: 82 MMHG | HEIGHT: 60 IN | RESPIRATION RATE: 20 BRPM | BODY MASS INDEX: 38.34 KG/M2 | TEMPERATURE: 96.9 F

## 2019-02-20 DIAGNOSIS — E55.9 VITAMIN D DEFICIENCY: ICD-10-CM

## 2019-02-20 DIAGNOSIS — K21.9 GASTROESOPHAGEAL REFLUX DISEASE WITHOUT ESOPHAGITIS: ICD-10-CM

## 2019-02-20 DIAGNOSIS — K21.9 GASTROESOPHAGEAL REFLUX DISEASE, ESOPHAGITIS PRESENCE NOT SPECIFIED: ICD-10-CM

## 2019-02-20 DIAGNOSIS — Z98.84 STATUS POST BARIATRIC SURGERY: ICD-10-CM

## 2019-02-20 DIAGNOSIS — M25.50 ARTHRALGIA, UNSPECIFIED JOINT: ICD-10-CM

## 2019-02-20 DIAGNOSIS — E55.9 HYPOVITAMINOSIS D: ICD-10-CM

## 2019-02-20 DIAGNOSIS — R73.9 HYPERGLYCEMIA: ICD-10-CM

## 2019-02-20 DIAGNOSIS — G47.33 OBSTRUCTIVE SLEEP APNEA SYNDROME: ICD-10-CM

## 2019-02-20 DIAGNOSIS — Z13.21 MALNUTRITION SCREEN: ICD-10-CM

## 2019-02-20 DIAGNOSIS — R79.0 ABNORMAL BLOOD LEVEL OF IRON: ICD-10-CM

## 2019-02-20 DIAGNOSIS — N28.9 KIDNEY FUNCTION ABNORMAL: ICD-10-CM

## 2019-02-20 DIAGNOSIS — Z90.3 POSTGASTRECTOMY MALABSORPTION: ICD-10-CM

## 2019-02-20 DIAGNOSIS — K91.2 POSTGASTRECTOMY MALABSORPTION: ICD-10-CM

## 2019-02-20 DIAGNOSIS — Z13.0 SCREENING, IRON DEFICIENCY ANEMIA: ICD-10-CM

## 2019-02-20 DIAGNOSIS — R53.83 FATIGUE, UNSPECIFIED TYPE: Primary | ICD-10-CM

## 2019-02-20 PROCEDURE — 99024 POSTOP FOLLOW-UP VISIT: CPT | Performed by: SURGERY

## 2019-02-20 RX ORDER — OMEPRAZOLE 20 MG/1
20 CAPSULE, DELAYED RELEASE ORAL DAILY
COMMUNITY
End: 2019-04-09 | Stop reason: SDUPTHER

## 2019-02-20 NOTE — PROGRESS NOTES
"[facility]  [address]  [facility] [address] [phone]        Patient Name:  Connie Lu.  :  1955      Date of Visit: 2019      Reason for Visit:   3 months postop     HPI: Connie Lu is a 63 y.o. female s/p LSG/HHR/lalit by [facility] on 18    If doesn't take PPI (omeprazole 20 mg daily) has bad reflux.  Reflux is rare.  Took PPI (Prevacid) before surgery.       Doing well.  Has vomited here and there, a few food intolerances.  No issues/concerns. Denies dysphagia, nausea and abdominal pain.  Getting  g prot/day.  +Constipated sometimes, also predates surgery.  Drinking 64+ fluid oz/day.  1 month labs revealed low Vitamin B12. Taking the 2500 mcg daily replacement dose.  Having a hard time tolerating B1--she thinks it smells bad, makes her urine smell bad.  Taking MVI, B12, B1, Vit D and iron.  Not taking calcium or vitamin C.  \"I don't remember being told to take those vitamins.\"  Her PCP requests that TSH also be drawn with today's labs.  On Omeprazole .  Exercise: has not committed to any regular exercise.        Drinks some creamer in her coffee.  Denies sweets.      Diet recall reveals likely getting <1000 per day.       Presurgery weight: 226 pounds.  Today's weight is 88.6 kg (195 lb 5 oz) pounds, today's  Body mass index is 38.79 kg/m²., and her weight loss since surgery is 31 pounds.      Past Medical History:   Diagnosis Date   • Anxiety    • Asthma     worse with heat, has inhaler for rare need   • Attention deficit disorder    • Boil     states it was not MRSA, once, many years ago   • Breast cancer (CMS/HCC) 2010    estrogen driven. S/p left mastectomy and right reduction. No requirements for radiation or chemo.  5 yrs tamoxifen   • Carpal tunnel syndrome    • Chronic cholecystitis with calculus     US stones Has epi pain, nausea   • Constipation     on linzess   • Depression    • Diverticulosis     " "noted on colonoscopy   • Gallstone    • Heart murmur     Has seen cardiologist 2017, had preop workup for left TKR   • Hiatal hernia with gastroesophageal reflux disease and esophagitis     controlled with esomeprazole.  EGD [facility] 6/18 HH, gr II esophagitis, superf antral ulcerations, 35 cm zline, path anturm neg h. pyl, stool ag 5/18 neg h. pyl.  path DE reflux   • History of blood transfusion     as an infant, unknown season.    • Hypertension    • Hypothyroidism    • Insomnia    • Iron deficiency     on iron supplements   • Joint pain     effexor helps. No NSAIDS, no injections.    • Kidney function abnormal     \"little low\" on previous labs, advised increased hydration   • Lower back pain    • RADHA (obstructive sleep apnea)     Non CPAP compliant   • Polyp of sigmoid colon    • Prediabetes     Hb A1C 6.10 5/18     Past Surgical History:   Procedure Laterality Date   • BREAST SURGERY Right 2010    Reduction   • CHOLECYSTECTOMY N/A 11/21/2018    Procedure: CHOLECYSTECTOMY LAPAROSCOPIC;  Surgeon: Maksim Jhaveri MD;  Location:  [facility];  Service: Bariatric   • COLONOSCOPY  2016    diverticulosis, h/p benign polyps   • COSMETIC SURGERY     • ENDOSCOPY     • ENDOSCOPY N/A 11/21/2018    Procedure: ESOPHAGOGASTRODUODENOSCOPY;  Surgeon: Maksim Jhaveri MD;  Location:  [facility];  Service: Bariatric   • GASTRIC SLEEVE LAPAROSCOPIC N/A 11/21/2018    Procedure: GASTRIC SLEEVE LAPAROSCOPIC;  Surgeon: Maksim Jhaveri MD;  Location:  [facility];  Service: Bariatric   • HIATAL HERNIA REPAIR N/A 11/21/2018    Procedure: HIATAL HERNIA REPAIR LAPAROSCOPIC;  Surgeon: Maksim Jhaveri MD;  Location:  [facility];  Service: Bariatric   • MASTECTOMY WITH IMMEDIATE RECONSTRUCTION Left 2010   • REPLACEMENT TOTAL KNEE Left 08/2017    Dr Nova   • RETINAL DETACHMENT REPAIR Left 07/2017   • SKIN BIOPSY     • TONSILLECTOMY AND ADENOIDECTOMY  1960, 1962     Outpatient Medications Marked as Taking for the 2/20/19 encounter (Office " Visit) with Janel Boyer MD   Medication Sig Dispense Refill   • azelastine (ASTELIN) 0.1 % nasal spray 2 sprays into the nostril(s) as directed by provider 2 (Two) Times a Day. Use in each nostril as directed 30 mL 12   • ferrous sulfate 325 (65 FE) MG tablet Take 1 tablet by mouth Daily With Breakfast. 30 tablet 5   • levothyroxine (SYNTHROID, LEVOTHROID) 137 MCG tablet Take 1 tablet by mouth Daily. 30 tablet 5   • losartan (COZAAR) 100 MG tablet Take 1 tablet by mouth Daily. 30 tablet 5   • omeprazole (priLOSEC) 20 MG capsule Take 20 mg by mouth Daily.     • ondansetron ODT (ZOFRAN-ODT) 4 MG disintegrating tablet Take 4 mg by mouth.     • traZODone (DESYREL) 50 MG tablet TAKE 1 OR 2 TABLETS BY MOUTH EVERY NIGHT AT BEDTIME 30 tablet 2   • venlafaxine XR (EFFEXOR-XR) 150 MG 24 hr capsule Take 1 capsule by mouth Daily. 30 capsule 5   • [DISCONTINUED] lansoprazole (PREVACID) 30 MG capsule Take 1 capsule by mouth Daily. 60 capsule 0       Allergies   Allergen Reactions   • Cephalexin Hives and Itching     Tolerates PCN fine, tolerates other cephalosporins well.    • Sudafed [Pseudoephedrine Hcl] Itching     Scalp itching       Social History     Socioeconomic History   • Marital status: Single     Spouse name: Not on file   • Number of children: Not on file   • Years of education: Not on file   • Highest education level: Not on file   Social Needs   • Financial resource strain: Not on file   • Food insecurity - worry: Not on file   • Food insecurity - inability: Not on file   • Transportation needs - medical: Not on file   • Transportation needs - non-medical: Not on file   Occupational History   • Occupation: unemployed   Tobacco Use   • Smoking status: Never Smoker   • Smokeless tobacco: Never Used   Substance and Sexual Activity   • Alcohol use: No   • Drug use: No   • Sexual activity: Defer     Comment: no hormones   Other Topics Concern   • Not on file   Social History Narrative    Lives in [place]  "with mother.  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        /82 (BP Location: Right arm, Patient Position: Sitting, Cuff Size: Adult)   Pulse 72   Temp 96.9 °F (36.1 °C) (Temporal)   Resp 20   Ht 151.1 cm (59.5\")   Wt 88.6 kg (195 lb 5 oz)   BMI 38.79 kg/m²     Physical Exam   Constitutional: She is oriented to person, place, and time. She appears well-developed and well-nourished. No distress.   HENT:   Head: Normocephalic and atraumatic.   Mouth/Throat: No oropharyngeal exudate.   Eyes: Conjunctivae and EOM are normal. Pupils are equal, round, and reactive to light.   Pulmonary/Chest: Effort normal. No respiratory distress.   Abdominal: Soft. She exhibits no distension.   Neurological: She is alert and oriented to person, place, and time. No cranial nerve deficit.   Skin: Skin is warm and dry. She is not diaphoretic. No pallor.   Psychiatric: She has a normal mood and affect. Her behavior is normal. Thought content normal.         Assessment:  3 months s/p LSG/HHR/lalit by [facility] on 11/21/18      ICD-10-CM ICD-9-CM   1. Fatigue, unspecified type R53.83 780.79   2. Hypovitaminosis D E55.9 268.9   3. Postgastrectomy malabsorption K91.2 579.3    Z90.3    4. Screening, iron deficiency anemia Z13.0 V78.0   5. Malnutrition screen Z13.21 V77.2   6. Gastroesophageal reflux disease without esophagitis K21.9 530.81   7. Vitamin D deficiency E55.9 268.9   8. Abnormal blood level of iron R79.0 790.6   9. Status post bariatric surgery Z98.84 V45.86   10. Gastroesophageal reflux disease, esophagitis presence not specified K21.9 530.81   11. Hyperglycemia R73.9 790.29   12. Kidney function abnormal N28.9 593.9   13. Obstructive sleep apnea syndrome G47.33 327.23   14. Arthralgia, unspecified joint M25.50 719.40         Plan:  Doing well. Continue w/ good food choices and healthy habits.  Continue protein >70g/day.  Continue routine exercise.  Routine bariatric labs ordered.  Continue vitamins w/ " adjustments pending lab results.  Call w/ problems/concerns.     Constipation: try OTC Colace 100 mg bid, Miralax up to QID.      Recommend increase calories to 1200 per day.  I think weight loss is slow b/c too calorie restricted and no exercise.      The patient was instructed to follow up in 3 months, sooner if needed.    note: approx 15 of the 25 minute visit was spent counseling on nutrition and necessary dietary/lifestyle modifications.    Janel Boyer MD

## 2019-02-24 LAB
25(OH)D3+25(OH)D2 SERPL-MCNC: 56.6 NG/ML (ref 30–100)
ALBUMIN SERPL-MCNC: 4.5 G/DL (ref 3.6–4.8)
ALBUMIN/GLOB SERPL: 1.7 {RATIO} (ref 1.2–2.2)
ALP SERPL-CCNC: 103 IU/L (ref 39–117)
ALT SERPL-CCNC: 16 IU/L (ref 0–32)
AST SERPL-CCNC: 18 IU/L (ref 0–40)
BASOPHILS # BLD AUTO: 0 X10E3/UL (ref 0–0.2)
BASOPHILS NFR BLD AUTO: 0 %
BILIRUB SERPL-MCNC: 0.3 MG/DL (ref 0–1.2)
BUN SERPL-MCNC: 28 MG/DL (ref 8–27)
BUN/CREAT SERPL: 42 (ref 12–28)
CALCIUM SERPL-MCNC: 9.6 MG/DL (ref 8.7–10.3)
CHLORIDE SERPL-SCNC: 105 MMOL/L (ref 96–106)
CO2 SERPL-SCNC: 20 MMOL/L (ref 20–29)
CREAT SERPL-MCNC: 0.67 MG/DL (ref 0.57–1)
EOSINOPHIL # BLD AUTO: 0.2 X10E3/UL (ref 0–0.4)
EOSINOPHIL NFR BLD AUTO: 2 %
ERYTHROCYTE [DISTWIDTH] IN BLOOD BY AUTOMATED COUNT: 15.6 % (ref 12.3–15.4)
FERRITIN SERPL-MCNC: 77 NG/ML (ref 15–150)
FOLATE SERPL-MCNC: 18.8 NG/ML
GLOBULIN SER CALC-MCNC: 2.6 G/DL (ref 1.5–4.5)
GLUCOSE SERPL-MCNC: 88 MG/DL (ref 65–99)
HCT VFR BLD AUTO: 43.1 % (ref 34–46.6)
HGB BLD-MCNC: 14.5 G/DL (ref 11.1–15.9)
IMM GRANULOCYTES # BLD AUTO: 0 X10E3/UL (ref 0–0.1)
IMM GRANULOCYTES NFR BLD AUTO: 0 %
IRON SERPL-MCNC: 72 UG/DL (ref 27–139)
LYMPHOCYTES # BLD AUTO: 2.6 X10E3/UL (ref 0.7–3.1)
LYMPHOCYTES NFR BLD AUTO: 33 %
Lab: NORMAL
MCH RBC QN AUTO: 29.8 PG (ref 26.6–33)
MCHC RBC AUTO-ENTMCNC: 33.6 G/DL (ref 31.5–35.7)
MCV RBC AUTO: 89 FL (ref 79–97)
METHYLMALONATE SERPL-SCNC: 173 NMOL/L (ref 0–378)
MONOCYTES # BLD AUTO: 0.5 X10E3/UL (ref 0.1–0.9)
MONOCYTES NFR BLD AUTO: 6 %
NEUTROPHILS # BLD AUTO: 4.8 X10E3/UL (ref 1.4–7)
NEUTROPHILS NFR BLD AUTO: 59 %
PLATELET # BLD AUTO: 284 X10E3/UL (ref 150–379)
POTASSIUM SERPL-SCNC: 4.7 MMOL/L (ref 3.5–5.2)
PREALB SERPL-MCNC: 19 MG/DL (ref 10–36)
PROT SERPL-MCNC: 7.1 G/DL (ref 6–8.5)
RBC # BLD AUTO: 4.87 X10E6/UL (ref 3.77–5.28)
SODIUM SERPL-SCNC: 143 MMOL/L (ref 134–144)
VIT B1 BLD-SCNC: 288 NMOL/L (ref 66.5–200)
WBC # BLD AUTO: 8.1 X10E3/UL (ref 3.4–10.8)

## 2019-03-22 DIAGNOSIS — F51.01 PRIMARY INSOMNIA: ICD-10-CM

## 2019-03-22 RX ORDER — TRAZODONE HYDROCHLORIDE 50 MG/1
TABLET ORAL
Qty: 30 TABLET | Refills: 2 | Status: SHIPPED | OUTPATIENT
Start: 2019-03-22 | End: 2019-04-09

## 2019-03-23 DIAGNOSIS — F51.01 PRIMARY INSOMNIA: ICD-10-CM

## 2019-03-25 RX ORDER — TRAZODONE HYDROCHLORIDE 50 MG/1
TABLET ORAL
Qty: 30 TABLET | Refills: 2 | Status: SHIPPED | OUTPATIENT
Start: 2019-03-25 | End: 2019-04-09

## 2019-04-09 ENCOUNTER — TELEPHONE (OUTPATIENT)
Dept: FAMILY MEDICINE CLINIC | Facility: CLINIC | Age: 64
End: 2019-04-09

## 2019-04-09 DIAGNOSIS — E03.9 ACQUIRED HYPOTHYROIDISM: ICD-10-CM

## 2019-04-09 DIAGNOSIS — F51.01 PRIMARY INSOMNIA: ICD-10-CM

## 2019-04-09 DIAGNOSIS — I10 ESSENTIAL HYPERTENSION: ICD-10-CM

## 2019-04-09 DIAGNOSIS — F41.9 ANXIETY: ICD-10-CM

## 2019-04-09 RX ORDER — VENLAFAXINE HYDROCHLORIDE 150 MG/1
150 CAPSULE, EXTENDED RELEASE ORAL DAILY
Qty: 30 CAPSULE | Refills: 5 | Status: SHIPPED | OUTPATIENT
Start: 2019-04-09 | End: 2019-07-26

## 2019-04-09 RX ORDER — LEVOTHYROXINE SODIUM 137 UG/1
137 TABLET ORAL DAILY
Qty: 30 TABLET | Refills: 5 | Status: SHIPPED | OUTPATIENT
Start: 2019-04-09 | End: 2019-04-16

## 2019-04-09 RX ORDER — TRAZODONE HYDROCHLORIDE 50 MG/1
50-100 TABLET ORAL NIGHTLY
Qty: 60 TABLET | Refills: 5 | Status: SHIPPED | OUTPATIENT
Start: 2019-04-09 | End: 2020-02-04 | Stop reason: SDUPTHER

## 2019-04-09 RX ORDER — LOSARTAN POTASSIUM 100 MG/1
100 TABLET ORAL DAILY
Qty: 30 TABLET | Refills: 5 | Status: SHIPPED | OUTPATIENT
Start: 2019-04-09 | End: 2019-06-17 | Stop reason: SDUPTHER

## 2019-04-09 RX ORDER — OMEPRAZOLE 20 MG/1
20 CAPSULE, DELAYED RELEASE ORAL DAILY
Qty: 30 CAPSULE | Refills: 5 | Status: SHIPPED | OUTPATIENT
Start: 2019-04-09 | End: 2020-10-08 | Stop reason: SDUPTHER

## 2019-04-09 NOTE — TELEPHONE ENCOUNTER
Please call.  I have sent in requested medications.  Unfortunately, it looks like bariatric did not do the TSH is ordered in January to be drawn in February.  She needs to stop and have that done.  The order is still there in the system for TSH.

## 2019-04-09 NOTE — TELEPHONE ENCOUNTER
----- Message from Makenna Reardon sent at 4/9/2019  9:28 AM EDT -----  Contact: DALJIT; ERNST HOSKINS; LONG SAHNI    levothyroxine (SYNTHROID, LEVOTHROID) 137 MCG tablet Take 1 tablet by mouth Daily.   losartan (COZAAR) 100 MG tablet Take 1 tablet by mouth Daily.   omeprazole (priLOSEC) 20 MG capsule Take 20 mg by mouth Daily.   ondansetron ODT (ZOFRAN-ODT) 4 MG disintegrating tablet Take 4 mg by mouth.   traZODone (DESYREL) 50 MG tablet TAKE 1 OR 2 TABLETS BY MOUTH EVERY NIGHT AT BEDTIME   traZODone (DESYREL) 50 MG tablet TAKE 1 OR 2 TABLETS BY MOUTH EVERY NIGHT AT BEDTIME   venlafaxine XR (EFFEXOR-XR) 150 MG 24 hr capsule Take 1 capsule by mouth Daily.    [facility]      [facility] [address] [place] [address] - [phone]  - [phone]  [phone] (Phone)  [phone] (Fax)

## 2019-04-12 DIAGNOSIS — E03.8 OTHER SPECIFIED HYPOTHYROIDISM: Primary | ICD-10-CM

## 2019-04-12 DIAGNOSIS — E03.8 OTHER SPECIFIED HYPOTHYROIDISM: ICD-10-CM

## 2019-04-13 LAB — TSH SERPL DL<=0.005 MIU/L-ACNC: 0.07 UIU/ML (ref 0.45–4.5)

## 2019-04-16 DIAGNOSIS — E03.9 ACQUIRED HYPOTHYROIDISM: ICD-10-CM

## 2019-04-16 RX ORDER — LEVOTHYROXINE SODIUM 0.12 MG/1
125 TABLET ORAL DAILY
Qty: 30 TABLET | Refills: 2 | Status: SHIPPED | OUTPATIENT
Start: 2019-04-16 | End: 2019-06-17 | Stop reason: SDUPTHER

## 2019-04-23 RX ORDER — LEVOTHYROXINE SODIUM 137 UG/1
TABLET ORAL
Qty: 30 TABLET | Refills: 0 | OUTPATIENT
Start: 2019-04-23

## 2019-04-25 DIAGNOSIS — I10 ESSENTIAL HYPERTENSION: ICD-10-CM

## 2019-04-25 RX ORDER — LOSARTAN POTASSIUM 100 MG/1
TABLET ORAL
Qty: 30 TABLET | Refills: 0 | Status: SHIPPED | OUTPATIENT
Start: 2019-04-25 | End: 2019-06-05

## 2019-06-05 ENCOUNTER — OFFICE VISIT (OUTPATIENT)
Dept: BARIATRICS/WEIGHT MGMT | Facility: CLINIC | Age: 64
End: 2019-06-05

## 2019-06-05 VITALS
HEIGHT: 60 IN | DIASTOLIC BLOOD PRESSURE: 66 MMHG | WEIGHT: 188 LBS | SYSTOLIC BLOOD PRESSURE: 112 MMHG | OXYGEN SATURATION: 98 % | HEART RATE: 75 BPM | TEMPERATURE: 96.9 F | RESPIRATION RATE: 18 BRPM | BODY MASS INDEX: 36.91 KG/M2

## 2019-06-05 DIAGNOSIS — E66.9 OBESITY, CLASS II, BMI 35-39.9: ICD-10-CM

## 2019-06-05 DIAGNOSIS — E55.9 VITAMIN D DEFICIENCY: ICD-10-CM

## 2019-06-05 DIAGNOSIS — R79.0 ABNORMAL BLOOD LEVEL OF IRON: ICD-10-CM

## 2019-06-05 DIAGNOSIS — R10.13 DYSPEPSIA: Primary | ICD-10-CM

## 2019-06-05 DIAGNOSIS — R11.0 NAUSEA: ICD-10-CM

## 2019-06-05 DIAGNOSIS — R53.83 FATIGUE, UNSPECIFIED TYPE: ICD-10-CM

## 2019-06-05 DIAGNOSIS — R13.10 DYSPHAGIA, UNSPECIFIED TYPE: ICD-10-CM

## 2019-06-05 PROCEDURE — 99214 OFFICE O/P EST MOD 30 MIN: CPT | Performed by: PHYSICIAN ASSISTANT

## 2019-06-05 NOTE — PROGRESS NOTES
"[facility]ariat[facility]  [address]  [facility] [address] [phone]      Patient Name:  Connie Lu.  :  1955      Date of Visit:  2019      Reason for Visit:  6 months postop    HPI:  Connie Lu is a 64 y.o. female s/p LSG/HHR/lalit by [facility] on 18    Discouraged b/c she says she doesn't feel like she can eat/tolerate \"real foods.\"  Minimal variety.  Feels too limited w/ her food choices.  Eating cottage cheese + fruit for breakfast.  Snacking on nuts/cheeses.  Microwaving frozen vegetables for dinner.  Drinking protein water.  Getting 70-90g prot/day but does not specifically keep track.  Eggs and meats \"turn my stomach\" - nausea, w/ occ vomiting, some cervical dysphagia (was also an occ issue before surgery), but no abd.pain.  Has Zofran but takes infrequently b/c she feels like she really doesn't need it.  Has not tried taking prior to mealtime.  Taking Omeprazole 20mg qAM, but having some breakthrough reflux at nights.    Admits she is dealing w/ a lot of stress/anxiety r/t caring for her mother.  Says frustrated w/ herself b/c she can't \"get motivated\" to exercise.  Wishes she had lost more weight at this time.  \"I'm just sick of me.\"      Labs 19 looked good.  Taking MVI, Calcium, Vit D, Vit B12.      Presurgery weight: 226 pounds.  Today's weight is 85.3 kg (188 lb) pounds, today's  Body mass index is 37.34 kg/m²., and her weight loss since surgery is 38 pounds.         Past Medical History:   Diagnosis Date   • Anxiety    • Asthma     worse with heat, has inhaler for rare need   • Attention deficit disorder    • Boil     states it was not MRSA, once, many years ago   • Breast cancer (CMS/HCC) 2010    estrogen driven. S/p left mastectomy and right reduction. No requirements for radiation or chemo.  5 yrs tamoxifen   • Carpal tunnel syndrome    • Chronic cholecystitis with calculus     US stones Has epi pain, nausea   • " "Constipation     on linzess   • Depression    • Diverticulosis     noted on colonoscopy   • Gallstone    • Heart murmur     Has seen cardiologist 2017, had preop workup for left TKR   • Hiatal hernia with gastroesophageal reflux disease and esophagitis     controlled with esomeprazole.  EGD [facility] 6/18 HH, gr II esophagitis, superf antral ulcerations, 35 cm zline, path anturm neg h. pyl, stool ag 5/18 neg h. pyl.  path DE reflux   • History of blood transfusion     as an infant, unknown season.    • Hypertension    • Hypothyroidism    • Insomnia    • Iron deficiency     on iron supplements   • Joint pain     effexor helps. No NSAIDS, no injections.    • Kidney function abnormal     \"little low\" on previous labs, advised increased hydration   • Lower back pain    • RADHA (obstructive sleep apnea)     Non CPAP compliant   • Polyp of sigmoid colon    • Prediabetes     Hb A1C 6.10 5/18     Past Surgical History:   Procedure Laterality Date   • BREAST SURGERY Right 2010    Reduction   • CHOLECYSTECTOMY N/A 11/21/2018    Procedure: CHOLECYSTECTOMY LAPAROSCOPIC;  Surgeon: Maksim Jhaveri MD;  Location:  [facility];  Service: Bariatric   • COLONOSCOPY  2016    diverticulosis, h/p benign polyps   • COSMETIC SURGERY     • ENDOSCOPY     • ENDOSCOPY N/A 11/21/2018    Procedure: ESOPHAGOGASTRODUODENOSCOPY;  Surgeon: Maksim Jhaveri MD;  Location:  [facility];  Service: Bariatric   • GASTRIC SLEEVE LAPAROSCOPIC N/A 11/21/2018    Procedure: GASTRIC SLEEVE LAPAROSCOPIC;  Surgeon: Maksim Jhaveri MD;  Location:  [facility];  Service: Bariatric   • HIATAL HERNIA REPAIR N/A 11/21/2018    Procedure: HIATAL HERNIA REPAIR LAPAROSCOPIC;  Surgeon: Maksim Jhaveri MD;  Location:  [facility];  Service: Bariatric   • MASTECTOMY WITH IMMEDIATE RECONSTRUCTION Left 2010   • REPLACEMENT TOTAL KNEE Left 08/2017    Dr Nova   • RETINAL DETACHMENT REPAIR Left 07/2017   • SKIN BIOPSY     • TONSILLECTOMY AND ADENOIDECTOMY  1960, 1962 " "    Outpatient Medications Marked as Taking for the 6/5/19 encounter (Office Visit) with Ashlee Encarnacion PA   Medication Sig Dispense Refill   • Ascorbic Acid (VITAMIN C ER PO) Take  by mouth.     • ferrous sulfate 325 (65 FE) MG tablet Take 1 tablet by mouth Daily With Breakfast. 30 tablet 5   • levothyroxine (SYNTHROID, LEVOTHROID) 125 MCG tablet Take 1 tablet by mouth Daily. 30 tablet 2   • losartan (COZAAR) 100 MG tablet Take 1 tablet by mouth Daily. 30 tablet 5   • Multiple Vitamin (CALCIUM COMPLEX PO) Take  by mouth.     • omeprazole (priLOSEC) 20 MG capsule Take 1 capsule by mouth Daily. 30 capsule 5   • ondansetron ODT (ZOFRAN-ODT) 4 MG disintegrating tablet Take 4 mg by mouth.     • traZODone (DESYREL) 50 MG tablet Take 1-2 tablets by mouth Every Night. 60 tablet 5   • venlafaxine XR (EFFEXOR-XR) 150 MG 24 hr capsule Take 1 capsule by mouth Daily. 30 capsule 5     Allergies   Allergen Reactions   • Cephalexin Hives and Itching     Tolerates PCN fine, tolerates other cephalosporins well.    • Sudafed [Pseudoephedrine Hcl] Itching     Scalp itching       Social History     Socioeconomic History   • Marital status: Single     Spouse name: Not on file   • Number of children: Not on file   • Years of education: Not on file   • Highest education level: Not on file   Occupational History   • Occupation: unemployed   Tobacco Use   • Smoking status: Never Smoker   • Smokeless tobacco: Never Used   Substance and Sexual Activity   • Alcohol use: No   • Drug use: No   • Sexual activity: Defer     Comment: no hormones   Social History Narrative    Lives in [place] with mother.  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        /66 (BP Location: Right arm, Patient Position: Sitting, Cuff Size: Adult)   Pulse 75   Temp 96.9 °F (36.1 °C) (Temporal)   Resp 18   Ht 151.1 cm (59.5\")   Wt 85.3 kg (188 lb)   SpO2 98%   BMI 37.34 kg/m²   Physical Exam   Constitutional: She appears well-developed " and well-nourished. She is cooperative.   HENT:   Mouth/Throat: Oropharynx is clear and moist and mucous membranes are normal.   Eyes: Conjunctivae are normal. No scleral icterus.   Cardiovascular: Normal rate.   Pulmonary/Chest: Effort normal.   Abdominal: Soft. There is no tenderness.   Musculoskeletal: Normal range of motion. She exhibits no edema.   Neurological: She is alert.   Skin: Skin is warm and dry. No rash noted.   Psychiatric: She has a normal mood and affect. Judgment normal.         Assessment:   6 months s/p LSG/HHR/lalit by [facility] on 11/21/18    ICD-10-CM ICD-9-CM   1. Dyspepsia R10.13 536.8   2. Nausea R11.0 787.02   3. Fatigue, unspecified type R53.83 780.79   4. Dysphagia, unspecified type R13.10 787.20   5. Vitamin D deficiency E55.9 268.9   6. Abnormal blood level of iron R79.0 790.6   7. Obesity, Class II, BMI 35-39.9 E66.9 278.00       Plan:   Labs + UGI ordered to further evaluate.  Increase Omeprazole to 40mg daily.  Start taking Zofran more frequently as needed.  Simplify vitamin regimen to MVI + Calcium.  Further adjustments pending lab results.  Follow up w/ dietitian re: food choices/variety.  Follow up w/ PCP re: psych meds.  Call w/ problems/concerns.    The patient was instructed to follow up in 3 months, sooner if needed.

## 2019-06-09 LAB
25(OH)D3+25(OH)D2 SERPL-MCNC: 61.9 NG/ML (ref 30–100)
ALBUMIN SERPL-MCNC: 4.1 G/DL (ref 3.6–4.8)
ALBUMIN/GLOB SERPL: 1.5 {RATIO} (ref 1.2–2.2)
ALP SERPL-CCNC: 104 IU/L (ref 39–117)
ALT SERPL-CCNC: 17 IU/L (ref 0–32)
AST SERPL-CCNC: 25 IU/L (ref 0–40)
BASOPHILS # BLD AUTO: 0 X10E3/UL (ref 0–0.2)
BASOPHILS NFR BLD AUTO: 0 %
BILIRUB SERPL-MCNC: 0.4 MG/DL (ref 0–1.2)
BUN SERPL-MCNC: 24 MG/DL (ref 8–27)
BUN/CREAT SERPL: 34 (ref 12–28)
CALCIUM SERPL-MCNC: 9.6 MG/DL (ref 8.7–10.3)
CHLORIDE SERPL-SCNC: 107 MMOL/L (ref 96–106)
CO2 SERPL-SCNC: 23 MMOL/L (ref 20–29)
CREAT SERPL-MCNC: 0.7 MG/DL (ref 0.57–1)
EOSINOPHIL # BLD AUTO: 0.1 X10E3/UL (ref 0–0.4)
EOSINOPHIL NFR BLD AUTO: 2 %
ERYTHROCYTE [DISTWIDTH] IN BLOOD BY AUTOMATED COUNT: 15.1 % (ref 12.3–15.4)
FERRITIN SERPL-MCNC: 74 NG/ML (ref 15–150)
FOLATE SERPL-MCNC: >20 NG/ML
GLOBULIN SER CALC-MCNC: 2.7 G/DL (ref 1.5–4.5)
GLUCOSE SERPL-MCNC: 85 MG/DL (ref 65–99)
HCT VFR BLD AUTO: 44.3 % (ref 34–46.6)
HGB BLD-MCNC: 14.5 G/DL (ref 11.1–15.9)
IMM GRANULOCYTES # BLD AUTO: 0 X10E3/UL (ref 0–0.1)
IMM GRANULOCYTES NFR BLD AUTO: 0 %
IRON SERPL-MCNC: 111 UG/DL (ref 27–139)
LYMPHOCYTES # BLD AUTO: 2.7 X10E3/UL (ref 0.7–3.1)
LYMPHOCYTES NFR BLD AUTO: 37 %
Lab: NORMAL
MCH RBC QN AUTO: 29.6 PG (ref 26.6–33)
MCHC RBC AUTO-ENTMCNC: 32.7 G/DL (ref 31.5–35.7)
MCV RBC AUTO: 90 FL (ref 79–97)
METHYLMALONATE SERPL-SCNC: 168 NMOL/L (ref 0–378)
MONOCYTES # BLD AUTO: 0.5 X10E3/UL (ref 0.1–0.9)
MONOCYTES NFR BLD AUTO: 7 %
NEUTROPHILS # BLD AUTO: 4 X10E3/UL (ref 1.4–7)
NEUTROPHILS NFR BLD AUTO: 54 %
PLATELET # BLD AUTO: 286 X10E3/UL (ref 150–450)
POTASSIUM SERPL-SCNC: 5.3 MMOL/L (ref 3.5–5.2)
PREALB SERPL-MCNC: 19 MG/DL (ref 10–36)
PROT SERPL-MCNC: 6.8 G/DL (ref 6–8.5)
RBC # BLD AUTO: 4.9 X10E6/UL (ref 3.77–5.28)
SODIUM SERPL-SCNC: 144 MMOL/L (ref 134–144)
VIT B1 BLD-SCNC: 186.6 NMOL/L (ref 66.5–200)
WBC # BLD AUTO: 7.4 X10E3/UL (ref 3.4–10.8)

## 2019-06-11 ENCOUNTER — RESULTS ENCOUNTER (OUTPATIENT)
Dept: FAMILY MEDICINE CLINIC | Facility: CLINIC | Age: 64
End: 2019-06-11

## 2019-06-11 ENCOUNTER — HOSPITAL ENCOUNTER (OUTPATIENT)
Dept: GENERAL RADIOLOGY | Facility: HOSPITAL | Age: 64
Discharge: HOME | End: 2019-06-11
Admitting: PHYSICIAN ASSISTANT

## 2019-06-11 DIAGNOSIS — E03.9 ACQUIRED HYPOTHYROIDISM: ICD-10-CM

## 2019-06-11 DIAGNOSIS — R13.10 DYSPHAGIA, UNSPECIFIED TYPE: ICD-10-CM

## 2019-06-11 DIAGNOSIS — R10.13 DYSPEPSIA: ICD-10-CM

## 2019-06-11 PROCEDURE — 74241: CPT

## 2019-06-11 RX ADMIN — BARIUM SULFATE 183 ML: 960 POWDER, FOR SUSPENSION ORAL at 10:45

## 2019-06-13 ENCOUNTER — TELEPHONE (OUTPATIENT)
Dept: BARIATRICS/WEIGHT MGMT | Facility: CLINIC | Age: 64
End: 2019-06-13

## 2019-06-13 NOTE — TELEPHONE ENCOUNTER
Pt notified that her UGI revealted reflux and possibly a small hiatal hernia (note Dr Jhaveri did a hiatal hernia during her WLS), next step would to be an upper endoscopy/EGD w/ Dr Jhaveri if symptoms persist. Pt notified to give it some time to see if the modifications discussed at her LOV help her and to f/up in the office sooner if the issues persist. Pt verbalized her understanding of the results and to call if issues persist.

## 2019-06-13 NOTE — TELEPHONE ENCOUNTER
"----- Message from Maksim Jhaveri MD sent at 6/13/2019  7:43 AM EDT -----    Yes, agree, ty :)    ----- Message -----  From: Ashlee Encarnacion PA  Sent: 6/12/2019  11:39 AM  To: Maksim Jhaveri MD    Please review/advise:    64 y.o. female s/p LSG/HHR/lalit by [facility] on 11/21/18    Saw recently in the office for 6 month postop eval.  Having emotional/psych issues, lots of stressors, lacking motivation, feeling frustrated and discouraged w/ herself.  Did advise that she discuss medication adjustments w/ PCP.  Also complained that her diet was \"too restrictive\", lacking variety, and feeling like she was not eating \"real food.\"  Referred to the dietitian to discuss further.      But additionally she c/o intermittent nausea, w/ occ vomiting, some cervical dysphagia (was also an occ issue before surgery), but no abd.pain.  Has Zofran but takes infrequently b/c she feels like she really doesn't need it.  Has not tried taking prior to mealtime.  Was taking Omeprazole 20mg qAM, but having some breakthrough reflux at nights.  As above, gallbladder was removed w/ LSG/HHR.    Instructed to increase Omeprazole to 40mg daily.  Take Zofran more frequently to see if it helps.     Labs 6/5/19 revealed high potassium but were o/w normal.  UGI 6/11/19 @[facility] reads moderate reflux w/ small sliding HH.      Would you advise EGD, only if sx persist?        "

## 2019-06-17 DIAGNOSIS — I10 ESSENTIAL HYPERTENSION: ICD-10-CM

## 2019-06-17 DIAGNOSIS — E03.9 ACQUIRED HYPOTHYROIDISM: ICD-10-CM

## 2019-06-17 RX ORDER — LEVOTHYROXINE SODIUM 0.12 MG/1
125 TABLET ORAL DAILY
Qty: 30 TABLET | Refills: 2 | Status: SHIPPED | OUTPATIENT
Start: 2019-06-17 | End: 2019-07-26

## 2019-06-17 RX ORDER — LOSARTAN POTASSIUM 100 MG/1
100 TABLET ORAL DAILY
Qty: 30 TABLET | Refills: 5 | Status: SHIPPED | OUTPATIENT
Start: 2019-06-17 | End: 2019-07-26

## 2019-06-18 ENCOUNTER — OFFICE VISIT (OUTPATIENT)
Dept: FAMILY MEDICINE CLINIC | Facility: CLINIC | Age: 64
End: 2019-06-18

## 2019-06-18 VITALS
DIASTOLIC BLOOD PRESSURE: 78 MMHG | HEIGHT: 60 IN | RESPIRATION RATE: 18 BRPM | TEMPERATURE: 97.8 F | HEART RATE: 76 BPM | SYSTOLIC BLOOD PRESSURE: 122 MMHG | BODY MASS INDEX: 37.4 KG/M2 | WEIGHT: 190.5 LBS

## 2019-06-18 DIAGNOSIS — I10 ESSENTIAL HYPERTENSION: Primary | ICD-10-CM

## 2019-06-18 DIAGNOSIS — E86.0 DEHYDRATION: ICD-10-CM

## 2019-06-18 DIAGNOSIS — E87.5 HYPERKALEMIA: ICD-10-CM

## 2019-06-18 DIAGNOSIS — E03.9 ACQUIRED HYPOTHYROIDISM: ICD-10-CM

## 2019-06-18 PROCEDURE — 99214 OFFICE O/P EST MOD 30 MIN: CPT | Performed by: FAMILY MEDICINE

## 2019-06-18 RX ORDER — CHOLECALCIFEROL (VITAMIN D3) 50 MCG
TABLET ORAL
COMMUNITY
Start: 2019-01-10

## 2019-06-18 NOTE — PROGRESS NOTES
Assessment/Plan       Problems Addressed this Visit        Cardiovascular and Mediastinum    Hypertension - Primary       Endocrine    Hypothyroidism    Relevant Orders    TSH      Other Visit Diagnoses     Hyperkalemia        Relevant Orders    Comprehensive Metabolic Panel    Dehydration        Relevant Orders    Comprehensive Metabolic Panel            Follow up: Return if symptoms worsen or fail to improve, for follow up depends on review of labs and testing.     DISCUSSION  Hypertension.  Blood pressures doing well.  Continue current medications.  Check CMP in 1 week as ordered..    Hypothyroidism.  Dosage change about 2 months ago.  Recheck TSH in 1 week with the above CMP.    Recent mild hyperkalemia and elevated BUN creatinine ratio.  May be due to some dehydration.  Recommend increasing fluids and recheck CMP in 1 week.    Call if drainage or sinus symptoms return.      MEDICATIONS PRESCRIBED  Requested Prescriptions      No prescriptions requested or ordered in this encounter          -------------------------------------------    Subjective     Chief Complaint   Patient presents with   • Hypertension     f/u    • talk about past bloodwork         Hypertension   This is a chronic problem. The current episode started more than 1 year ago. The problem is unchanged. The problem is controlled. Associated symptoms include headaches (last week and better now). Pertinent negatives include no chest pain, peripheral edema or shortness of breath. Risk factors for coronary artery disease include obesity. Current antihypertension treatment includes angiotensin blockers. The current treatment provides moderate improvement. There are no compliance problems.  There is no history of angina, kidney disease or CAD/MI. There is no history of chronic renal disease.   dizzy at times  Does not check BP at home      Headache  Had bad headache  Last week  Ears hurting  Better now  + drainage and better  No fever  No chills  No  "sore throat except one day    Recent labs showed increased potassium.  Just slight increase.  Also had elevated BUN/creatinine ratio.  She may not be drinking enough water.  Had bariatric surgery last Thanksgiving.  Has lost 49 pounds.        Social History     Tobacco Use   Smoking Status Never Smoker   Smokeless Tobacco Never Used        Past Medical History,Medications, Allergies, and social history was reviewed.      Review of Systems   Constitutional: Negative.    Respiratory: Negative.  Negative for shortness of breath.    Cardiovascular: Negative.  Negative for chest pain.   Gastrointestinal: Negative.    Musculoskeletal: Positive for arthralgias and back pain.   Neurological: Positive for dizziness and headache.   Psychiatric/Behavioral: Negative.        Objective     Vitals:    06/18/19 1134   BP: 122/78   Pulse: 76   Resp: 18   Temp: 97.8 °F (36.6 °C)   Weight: 86.4 kg (190 lb 8 oz)   Height: 151.1 cm (59.5\")          Physical Exam   Constitutional: She appears well-developed and well-nourished.   HENT:   Head: Normocephalic and atraumatic.   Right Ear: Hearing, tympanic membrane, external ear and ear canal normal.   Left Ear: Hearing, tympanic membrane, external ear and ear canal normal.   Mouth/Throat: Oropharynx is clear and moist.   Eyes: Conjunctivae and EOM are normal. Pupils are equal, round, and reactive to light.   Neck: Normal range of motion. Neck supple. No thyromegaly present.   Cardiovascular: Normal rate, regular rhythm and normal heart sounds. Exam reveals no gallop and no friction rub.   No murmur heard.  Pulmonary/Chest: Effort normal and breath sounds normal. No respiratory distress. She has no wheezes. She has no rales.   Abdominal: Soft. Bowel sounds are normal. She exhibits no distension. There is no tenderness. There is no rebound and no guarding.   Musculoskeletal: She exhibits no edema.   Neurological: She is alert.   Skin: Skin is warm and dry.   Psychiatric: She has a normal mood " and affect.   Nursing note and vitals reviewed.                Masoud Martin MD

## 2019-06-25 ENCOUNTER — RESULTS ENCOUNTER (OUTPATIENT)
Dept: FAMILY MEDICINE CLINIC | Facility: CLINIC | Age: 64
End: 2019-06-25

## 2019-06-25 DIAGNOSIS — E03.9 ACQUIRED HYPOTHYROIDISM: ICD-10-CM

## 2019-06-25 DIAGNOSIS — E86.0 DEHYDRATION: ICD-10-CM

## 2019-06-25 DIAGNOSIS — E87.5 HYPERKALEMIA: ICD-10-CM

## 2019-07-10 DIAGNOSIS — E03.9 ACQUIRED HYPOTHYROIDISM: ICD-10-CM

## 2019-07-10 RX ORDER — LEVOTHYROXINE SODIUM 0.12 MG/1
TABLET ORAL
Qty: 30 TABLET | Refills: 0 | OUTPATIENT
Start: 2019-07-10

## 2019-07-11 DIAGNOSIS — E03.9 ACQUIRED HYPOTHYROIDISM: ICD-10-CM

## 2019-07-11 RX ORDER — LEVOTHYROXINE SODIUM 0.12 MG/1
TABLET ORAL
Qty: 30 TABLET | Refills: 0 | Status: SHIPPED | OUTPATIENT
Start: 2019-07-11 | End: 2019-07-26

## 2019-07-22 DIAGNOSIS — F41.9 ANXIETY: ICD-10-CM

## 2019-07-22 DIAGNOSIS — I10 ESSENTIAL HYPERTENSION: ICD-10-CM

## 2019-07-22 DIAGNOSIS — E61.1 IRON DEFICIENCY: ICD-10-CM

## 2019-07-23 LAB
ALBUMIN SERPL-MCNC: 4.4 G/DL (ref 3.5–5.2)
ALBUMIN/GLOB SERPL: 2.2 G/DL
ALP SERPL-CCNC: 106 U/L (ref 39–117)
ALT SERPL-CCNC: 14 U/L (ref 1–33)
AST SERPL-CCNC: 19 U/L (ref 1–32)
BILIRUB SERPL-MCNC: 0.2 MG/DL (ref 0.2–1.2)
BUN SERPL-MCNC: 24 MG/DL (ref 8–23)
BUN/CREAT SERPL: 32.9 (ref 7–25)
CALCIUM SERPL-MCNC: 9.2 MG/DL (ref 8.6–10.5)
CHLORIDE SERPL-SCNC: 101 MMOL/L (ref 98–107)
CO2 SERPL-SCNC: 23.4 MMOL/L (ref 22–29)
CREAT SERPL-MCNC: 0.73 MG/DL (ref 0.57–1)
GLOBULIN SER CALC-MCNC: 2 GM/DL
GLUCOSE SERPL-MCNC: 81 MG/DL (ref 65–99)
POTASSIUM SERPL-SCNC: 4.2 MMOL/L (ref 3.5–5.2)
PROT SERPL-MCNC: 6.4 G/DL (ref 6–8.5)
SODIUM SERPL-SCNC: 141 MMOL/L (ref 136–145)
TSH SERPL DL<=0.005 MIU/L-ACNC: 0.2 UIU/ML (ref 0.45–4.5)

## 2019-07-23 RX ORDER — LOSARTAN POTASSIUM 100 MG/1
TABLET ORAL
Qty: 30 TABLET | Refills: 0 | OUTPATIENT
Start: 2019-07-23

## 2019-07-23 RX ORDER — FERROUS SULFATE 325(65) MG
TABLET ORAL
Qty: 30 TABLET | Refills: 0 | OUTPATIENT
Start: 2019-07-23

## 2019-07-23 RX ORDER — VENLAFAXINE HYDROCHLORIDE 150 MG/1
CAPSULE, EXTENDED RELEASE ORAL
Qty: 30 CAPSULE | Refills: 0 | OUTPATIENT
Start: 2019-07-23

## 2019-07-23 NOTE — PROGRESS NOTES
[facility] GROUP BARIATRIC SURGERY  [address]  [facility] [address]  [phone]      Patient  Name:  Connie Lu  :  1955      Date of Visit: 2018      Chief Complaint:  weight gain; unable to maintain weight loss    History of Present Illness:  Connie Lu is a 62 y.o. female who presents today for evaluation, education and consultation regarding weight loss surgery. The patient is interested in sleeve gastrectomy.     Connie has been overweight for at least 56 years, has been 35 pounds or more overweight for at least 56 years, has been 100 pounds or more overweight for 40 or more years and started dieting at age 10.      Previous diet attempts include: Vivek Aguilar, Herbal Life, Low Carbohydrate, Low Fat, Calorie Counting, Cabbage Soup and Slim Fast; Nutri-System, Diet Center, Weight Watchers and medifast; Dexatrim, Amphetamines and Prozac.  The most weight Connie lost was 80  pounds on diet center but was only able to maintain that weight loss for less than 1 year.  Her maximum lifetime weight is 268 pounds.    As above, patient has been overweight for many years, with numerous failed dietary/weight loss attempts.  She now has obesity related comorbidities and as such has decided to pursue weight loss surgery.    GI history: Does have reflux controlled with esomeprazole.  Noted some tenderness of epigastric region on exam at Dr. Mustafa's office, has EGD scheduled at the end of the month for further eval.  Has not had EGD in the past. .    She has lower abdominal cramping with constipation, linzess has helped. Also c/o diffuse abdominal pain that my be worse with certain foods. Does note occasional nausea, usually in the mornings. Denies dysphagia, vomiting.  No h/o h pylori.        GFR was decreased on recent labs. PCP is monitoring. Last labs 3/5/18 GFR 51, BUN 23, Cr 1.10.    H/o breast cancer, was treated with mastectomy. Did not require  Detail Level: Zone "chemo/ radiation or further treatment.     Does have h/o asthma, worse in heat, rarely requires PRN albuterol.     Cardiac workup completed in the last year for preop clearance for knee surgery.     All other past medical, surgical, social and family history have been obtained and discussed as pertinent to bariatric surgery as below.     Past Medical History:   Diagnosis Date   • Anxiety    • Asthma     worse with heat, has inhaler for rare need   • Attention deficit disorder    • Boil     states it was not MRSA, once, many years ago   • Breast cancer 2010    estrogen driven. S/p left mastectomy and right reduction. No requirements for radiation or chemo.     • Carpal tunnel syndrome    • Constipation     on linzess   • Depression    • Diverticulosis     noted on colonoscopy   • GERD (gastroesophageal reflux disease)     controlled with esomeprazole.  EGD scheduled with Dr. Mustafa for evaluation of reflux, concern for HH.     • Heart murmur     Has seen cardiologist 2017, had preop workup for left TKR   • History of blood transfusion     as an infant, unknown season.    • Hypertension    • Hypothyroidism    • Insomnia    • Iron deficiency     on iron supplements   • Joint pain     effexor helps. No NSAIDS, no injections.    • Kidney function abnormal     \"little low\" on previous labs, advised increased hydration   • Lower back pain    • RADHA (obstructive sleep apnea)     Non CPAP compliant   • Polyp of sigmoid colon    • Prediabetes      Past Surgical History:   Procedure Laterality Date   • BREAST SURGERY Right 2010    Reduction   • COLONOSCOPY  2016    diverticulosis, h/p benign polyps   • MASTECTOMY WITH IMMEDIATE RECONSTRUCTION Left 2010   • REPLACEMENT TOTAL KNEE Left 08/2017    Dr Nova   • RETINAL DETACHMENT REPAIR Left 07/2017   • TONSILLECTOMY AND ADENOIDECTOMY  1960, 1962       Allergies   Allergen Reactions   • Cephalexin Hives and Itching     Tolerates PCN fine, tolerates other cephalosporins well.  " Detail Level: Simple       Current Outpatient Prescriptions:   •  docusate sodium (COLACE) 250 MG capsule, Take 250 mg by mouth Daily., Disp: , Rfl:   •  esomeprazole (NEXIUM 24HR) 20 MG capsule, Take 1 capsule by mouth Every Morning Before Breakfast., Disp: 30 capsule, Rfl: 1  •  ferrous sulfate (IRON SUPPLEMENT) 325 (65 FE) MG tablet, Take 1 tablet by mouth Daily With Breakfast., Disp: 30 tablet, Rfl: 2  •  levothyroxine (SYNTHROID, LEVOTHROID) 137 MCG tablet, TAKE ONE TABLET BY MOUTH EVERY DAY, Disp: 30 tablet, Rfl: 3  •  linaclotide (LINZESS) 290 MCG capsule capsule, Take 72 mcg by mouth Every Morning Before Breakfast., Disp: , Rfl:   •  losartan (COZAAR) 100 MG tablet, Take 1 tablet by mouth Daily., Disp: 30 tablet, Rfl: 5  •  traZODone (DESYREL) 50 MG tablet, Take 1-2 tablets by mouth every night at bedtime., Disp: 30 tablet, Rfl: 2  •  triamterene-hydrochlorothiazide (MAXZIDE-25) 37.5-25 MG per tablet, TAKE ONE TABLET BY MOUTH EVERY DAY, Disp: 30 tablet, Rfl: 2  •  venlafaxine XR (EFFEXOR-XR) 150 MG 24 hr capsule, Take 1 capsule by mouth Daily., Disp: 30 capsule, Rfl: 2    Social History     Social History   • Marital status: Single     Spouse name: N/A   • Number of children: N/A   • Years of education: N/A     Occupational History   • unemployed      Social History Main Topics   • Smoking status: Never Smoker   • Smokeless tobacco: Never Used   • Alcohol use No   • Drug use: No   • Sexual activity: Not on file      Comment: no hormones     Other Topics Concern   • Not on file     Social History Narrative    Lives in [place] with mother.  Works part times as  for [employer] in [place].      Family History   Problem Relation Age of Onset   • Diabetes Father    • Leukemia Father    • Skin cancer Father    • Heart attack Father    • Heart disease Father    • Sleep apnea Father    • Obesity Mother    • Hypertension Mother    • Skin cancer Brother    • Kidney failure Maternal Grandmother    • Hypertension Maternal  Patient Specific Counseling (Will Not Stick From Patient To Patient): Treat 2 weeks beyond clearing. Grandmother    • Hypertension Maternal Grandfather    • Stomach cancer Maternal Grandfather    • Hypertension Paternal Grandmother    • Obesity Paternal Grandmother    • Heart disease Paternal Grandmother    • Hypertension Paternal Grandfather    • Heart disease Paternal Grandfather        Review of Systems:  Constitutional:  The patient reports fatigue, weight gain and denies fevers and chills.  Cardiovascular:  The patient reports HTN, heart murmur and denies HLD, CP, MI, heart disease and DVT, edema.  Respiratory:  The patient reports asthma, apnea and denies PE.  Gastrointestinal:  The patient reports heartburn, constipation, nausea and denies pancreatitis, liver disease.  Genitourinary:  The patient reports renal insufficiency.    Musculoskeletal:  The patient reports joint pain, back pain, arthritis and denies fibromyalgia and autoimmune disease.  Neurological:  The patient reports none and denies seizure and stroke.  Psychiatric:  The patient reports anxiety, depression and denies bipolar disorder.  Endocrine:  The patient reports glucose intolerance, thyroid disease and denies gout.  Hematologic:  The patient reports hx blood transfusion and denies bleeding disorder.  Skin:  The patient reports h/o boils, last was many years ago.    Physical Exam:  Vital Signs:  Weight: 107 kg (236 lb)   Body mass index is 46.87 kg/m².  Temp: 97.8 °F (36.6 °C)   Heart Rate: 84   BP: 110/72     Physical Exam   Constitutional: She is oriented to person, place, and time. She appears well-developed and well-nourished.   HENT:   Head: Normocephalic and atraumatic.   Mouth/Throat: Oropharynx is clear and moist.   Eyes: EOM are normal.   Neck: Normal range of motion. Neck supple. No thyromegaly present.   Cardiovascular: Normal rate, regular rhythm and normal heart sounds.    Pulmonary/Chest: Effort normal and breath sounds normal. No respiratory distress. She has no wheezes.   Abdominal: Soft. Bowel sounds are normal. She exhibits  no distension. There is tenderness (RUQ tenderness).   Musculoskeletal: Normal range of motion.   Neurological: She is alert and oriented to person, place, and time.   Skin: Skin is warm and dry.   Psychiatric: She has a normal mood and affect. Her behavior is normal. Judgment and thought content normal.   Vitals reviewed.      Patient Active Problem List   Diagnosis   • Allergic rhinitis   • Malignant neoplasm of breast   • Carpal tunnel syndrome   • Depression   • Hyperlipidemia   • Hypertension   • Hypothyroidism   • Obstructive sleep apnea syndrome   • Morbid obesity   • Knee pain   • Vitamin D deficiency   • Hyperglycemia   • Primary insomnia   • Anxiety   • Diverticulosis   • Insomnia   • Heart murmur   • Lower back pain   • Kidney function abnormal   • Joint pain   • Iron deficiency   • History of blood transfusion   • GERD (gastroesophageal reflux disease)   • Constipation   • Boil   • Attention deficit disorder   • Asthma   • Prediabetes   • RADHA (obstructive sleep apnea)       Assessment:    Connie Lu is a 62 y.o. year old female with medically complicated obesity pursuing sleeve gastrectomy.    Weight loss surgery is deemed medically necessary given the following obesity related comorbidities including hypertension, osteoarthritis, back pain, knee pain, GERD, asthma and depression with current Weight: 107 kg (236 lb) and Body mass index is 46.87 kg/m²..    Plan:  The consultation plan and program requirements were reviewed with the patient.  The patient has been advised that a letter of medical support must be obtained from her primary care physician or referring provider. A psychological evaluation will be arranged.  A nutritional evaluation will be performed.  The patient was advised to start a high protein and low carbohydrate diet.  Necessary lifestyle modifications were discussed.  Instructions on how to access ROSA was given to the patient.  ROSA is an internet based educational video that  explains the surgical procedure chosen and answers basic questions regarding that procedure.     Preoperative testing will include: CBC, CMP, Fasting Lipids, TSH, HgA1C, H.Pylori, Pulmonary Function Testing, CXR, EKG and EGD- will proceed with EGD with Dr. Jhaveri, GB workup.       Additional preop clearances required prior to surgery: Cardiac. Patient will schedule with established specialist.  PCP is monitoring kidney function.     The patient has been educated on expected postoperative lifestyle changes, including commitment to high protein diet, vitamin regimen, and exercise program.  They are aware that support groups are encouraged for optimal weight loss results. Patient understands that bariatric surgery is not cosmetic surgery but rather a tool to help make a lifelong commitment to lifestyle changes including diet, exercise, behavior modifications, and healthy habits. The procedure was discussed with the patient and all questions were answered. The importance of avoiding ASA/ NSAIDS/ steroids/ tobacco/ hormones/ immunomodulators perioperatively was discussed.         Emma Cid PA-C    Addendum: GBUS 5/11/18 IMPRESSION: Cholelithiasis without sonographic evidence for cholecystitis.     Will discuss concomitant lap lalit at MD consult.                                             Detail Level: Detailed Detail Level: Generalized

## 2019-07-24 DIAGNOSIS — F41.9 ANXIETY: ICD-10-CM

## 2019-07-24 DIAGNOSIS — I10 ESSENTIAL HYPERTENSION: ICD-10-CM

## 2019-07-24 DIAGNOSIS — E61.1 IRON DEFICIENCY: ICD-10-CM

## 2019-07-26 ENCOUNTER — OFFICE VISIT (OUTPATIENT)
Dept: FAMILY MEDICINE CLINIC | Facility: CLINIC | Age: 64
End: 2019-07-26

## 2019-07-26 VITALS
HEART RATE: 76 BPM | HEIGHT: 60 IN | WEIGHT: 188 LBS | TEMPERATURE: 98.1 F | SYSTOLIC BLOOD PRESSURE: 120 MMHG | RESPIRATION RATE: 18 BRPM | DIASTOLIC BLOOD PRESSURE: 74 MMHG | BODY MASS INDEX: 36.91 KG/M2

## 2019-07-26 DIAGNOSIS — I10 ESSENTIAL HYPERTENSION: ICD-10-CM

## 2019-07-26 DIAGNOSIS — E03.9 ACQUIRED HYPOTHYROIDISM: Primary | ICD-10-CM

## 2019-07-26 DIAGNOSIS — R23.2 HOT FLASHES: ICD-10-CM

## 2019-07-26 DIAGNOSIS — R51.9 SINUS HEADACHE: ICD-10-CM

## 2019-07-26 PROCEDURE — 99214 OFFICE O/P EST MOD 30 MIN: CPT | Performed by: FAMILY MEDICINE

## 2019-07-26 RX ORDER — LEVOTHYROXINE SODIUM 112 UG/1
112 TABLET ORAL DAILY
Qty: 30 TABLET | Refills: 3 | Status: SHIPPED | OUTPATIENT
Start: 2019-07-26 | End: 2019-11-11 | Stop reason: SDUPTHER

## 2019-07-26 RX ORDER — FERROUS SULFATE 325(65) MG
TABLET ORAL
Qty: 30 TABLET | Refills: 5 | Status: SHIPPED | OUTPATIENT
Start: 2019-07-26 | End: 2020-01-06

## 2019-07-26 RX ORDER — LOSARTAN POTASSIUM 100 MG/1
TABLET ORAL
Qty: 30 TABLET | Refills: 5 | Status: SHIPPED | OUTPATIENT
Start: 2019-07-26 | End: 2020-01-30

## 2019-07-26 RX ORDER — VENLAFAXINE HYDROCHLORIDE 150 MG/1
CAPSULE, EXTENDED RELEASE ORAL
Qty: 30 CAPSULE | Refills: 5 | Status: SHIPPED | OUTPATIENT
Start: 2019-07-26 | End: 2020-06-26 | Stop reason: SDUPTHER

## 2019-07-26 NOTE — PROGRESS NOTES
Assessment/Plan       Problems Addressed this Visit        Cardiovascular and Mediastinum    Hypertension       Endocrine    Hypothyroidism - Primary    Relevant Medications    levothyroxine (SYNTHROID) 112 MCG tablet      Other Visit Diagnoses     Sinus headache                Follow up: Return in about 3 months (around 10/26/2019).     DISCUSSION  Hypothyroidism.  TSH is on the overactive side.  Decrease dose to 112 mcg daily.  This could be contributing to some of her hot flashes.  If not improving, she is to call.    Hypertension.  Blood pressure doing well.    Sinus headache.  Okay to try Excedrin since that is helped in the past.  No evidence of active infection at this time.  Call if worsening or not improving.      MEDICATIONS PRESCRIBED  Requested Prescriptions     Signed Prescriptions Disp Refills   • levothyroxine (SYNTHROID) 112 MCG tablet 30 tablet 3     Sig: Take 1 tablet by mouth Daily.          -------------------------------------------    Subjective     Chief Complaint   Patient presents with   • Hypertension     f/u    • Sinus Problem   • Hot Flashes         Sinus Problem   This is a new problem. The current episode started today. The problem is unchanged. There has been no fever. The pain is mild. Associated symptoms include congestion (clear), ear pain (left a little this am), headaches (frontal and eyes) and sinus pressure. Pertinent negatives include no shortness of breath. Treatments tried: no meds for it  The treatment provided no relief.   Hypertension   This is a chronic problem. The current episode started more than 1 year ago. The problem is unchanged. The problem is controlled. Associated symptoms include headaches (frontal and eyes). Pertinent negatives include no chest pain or shortness of breath.   Hypothyroidism   This is a chronic problem. The current episode started more than 1 year ago. The problem occurs daily. The problem has been unchanged. Associated symptoms include  "congestion (clear), fatigue and headaches (frontal and eyes). Pertinent negatives include no chest pain or vomiting. Associated symptoms comments: Hot flashes. Nothing aggravates the symptoms. Treatments tried: Levothyroxine. The treatment provided moderate relief.       Hot flashes  More this year  No period since 2011  Will get in a sweat at times  Does not wake her up  Just during the day and has a surge and lasts 6-10 min  Daily 3-4 times per day    On effexor XR   No vaginal bleeding                Social History     Tobacco Use   Smoking Status Never Smoker   Smokeless Tobacco Never Used        Past Medical History,Medications, Allergies, and social history was reviewed.      Review of Systems   Constitutional: Positive for fatigue.   HENT: Positive for congestion (clear), ear pain (left a little this am) and sinus pressure.    Respiratory: Negative for shortness of breath.    Cardiovascular: Negative for chest pain.   Gastrointestinal: Negative for vomiting.       Objective     Vitals:    07/26/19 1110   BP: 120/74   Pulse: 76   Resp: 18   Temp: 98.1 °F (36.7 °C)   Weight: 85.3 kg (188 lb)   Height: 151.1 cm (59.5\")          Physical Exam   Constitutional: She appears well-developed and well-nourished.   HENT:   Head: Normocephalic and atraumatic.   Right Ear: Hearing, tympanic membrane, external ear and ear canal normal.   Left Ear: Hearing, tympanic membrane, external ear and ear canal normal.   Mouth/Throat: Oropharynx is clear and moist.   Eyes: Conjunctivae and EOM are normal. Pupils are equal, round, and reactive to light.   Neck: Normal range of motion. Neck supple. No thyromegaly present.   Cardiovascular: Normal rate, regular rhythm and normal heart sounds. Exam reveals no gallop and no friction rub.   No murmur heard.  Pulmonary/Chest: Effort normal and breath sounds normal. No respiratory distress. She has no wheezes. She has no rales.   Abdominal: Soft. Bowel sounds are normal. She exhibits no " distension. There is no tenderness. There is no rebound and no guarding.   Musculoskeletal: She exhibits no edema.   Neurological: She is alert.   Skin: Skin is warm and dry.   Psychiatric: She has a normal mood and affect.   Nursing note and vitals reviewed.                Masoud Martin MD

## 2019-09-16 ENCOUNTER — OFFICE VISIT (OUTPATIENT)
Dept: BARIATRICS/WEIGHT MGMT | Facility: CLINIC | Age: 64
End: 2019-09-16

## 2019-09-16 VITALS
DIASTOLIC BLOOD PRESSURE: 76 MMHG | WEIGHT: 183.5 LBS | HEIGHT: 60 IN | OXYGEN SATURATION: 98 % | HEART RATE: 77 BPM | TEMPERATURE: 97 F | RESPIRATION RATE: 18 BRPM | SYSTOLIC BLOOD PRESSURE: 130 MMHG | BODY MASS INDEX: 36.02 KG/M2

## 2019-09-16 DIAGNOSIS — Z90.3 POSTGASTRECTOMY MALABSORPTION: ICD-10-CM

## 2019-09-16 DIAGNOSIS — R53.83 FATIGUE, UNSPECIFIED TYPE: ICD-10-CM

## 2019-09-16 DIAGNOSIS — Z98.84 STATUS POST BARIATRIC SURGERY: ICD-10-CM

## 2019-09-16 DIAGNOSIS — E55.9 HYPOVITAMINOSIS D: ICD-10-CM

## 2019-09-16 DIAGNOSIS — Z13.21 MALNUTRITION SCREEN: ICD-10-CM

## 2019-09-16 DIAGNOSIS — E66.9 OBESITY, CLASS II, BMI 35-39.9: Primary | ICD-10-CM

## 2019-09-16 DIAGNOSIS — Z13.0 SCREENING, IRON DEFICIENCY ANEMIA: ICD-10-CM

## 2019-09-16 DIAGNOSIS — K91.2 POSTGASTRECTOMY MALABSORPTION: ICD-10-CM

## 2019-09-16 PROCEDURE — 99214 OFFICE O/P EST MOD 30 MIN: CPT | Performed by: PHYSICIAN ASSISTANT

## 2019-09-16 NOTE — PROGRESS NOTES
"[facility] Bariatric Surgery  [address]  [facility] [id number] [phone]        Patient Name:  Connie Lu.  :  1955        Reason for Visit:   10 months postop      HPI: Connie Lu is a 64 y.o. female  s/p LSG/HHR/lalit by [facility] on 18     LOV 2019- frustated with limited diet, nausa with eggs, breakthrough reflux on omeprazole 20mg . UGI ordered for eval. Increased omeprazole 40mg daily. UGI below, advised EGD if symptoms persist.     UGI 19 at [facility] IMPRESSION:  1. Status post vertical sleeve gastrectomy x7 months. There was no  evidence of extraluminal contrast. No postoperative strictures were  seen.  2. Moderate gastroesophageal reflux to the level of the thoracic inlet  3. Small sized sliding-type hiatal hernia     Doing ok. Says she is really tired, exhausted, has been very busy. Is in process of selling her house and showings are a lot of work, also  of [employer] and working really hard there.   Has occasional dysphagia, no different than what predates LSG, intermittent and infrequent. Reflux controlled with omeprazole 20mg daily, did not increase to 40mg. Nausea resolved. Not pleased with herself in regards to weightloss progress. Meant to talk to dietician LOV but forgot, asking if she can see her today.  Denies vomiting.  Getting 70 prot/day, occasional protein shake, daily protein water. Coffee, cottage cheese/ fruit in morning. Typically gets excruciating lower abdominal \"intestinal\" pain after her breakfast that precedes loose stools, UTD on colonoscopy, symptoms also predate LSG. Midmorning protein water. Cottage cheese and fruit for lunch. Doesn't cook, almost always eats out- [employer] burger without bun or [employer] salad with chicken, [employer]. Snack on nuts/ cheese/ fruit or Cheese/ PB crackers.     Drinking 64+ fluid oz/day.  6 month labs revealed bariatric levels wnl .  Taking MVI, B12, Vit D, iron and Vit C.  On " "Omeprazole .  Not exercising.     Presurgery weight: 226 pounds.  Today's weight is 83.2 kg (183 lb 8 oz) pounds, today's  Body mass index is 36.44 kg/m²., and her weight loss since surgery is 43 pounds.      Past Medical History:   Diagnosis Date   • ADHD (attention deficit hyperactivity disorder) 30+ yrs ago    no action taken   • Allergic annual    sinus infections   • Anemia 8/01/2018    approximately   • Anxiety    • Arthritis 2017    Knee replaced Dr Fontenot   • Asthma     worse with heat, has inhaler for rare need   • Attention deficit disorder    • Boil     states it was not MRSA, once, many years ago   • Breast cancer (CMS/[facility]) 2010    estrogen driven. S/p left mastectomy and right reduction. No requirements for radiation or chemo.  5 yrs tamoxifen   • Carpal tunnel syndrome    • Chronic cholecystitis with calculus     US stones Has epi pain, nausea   • Constipation     on linzess   • Depression    • Diverticulosis     noted on colonoscopy   • Gallstone    • Heart murmur     Has seen cardiologist 2017, had preop workup for left TKR   • Hiatal hernia with gastroesophageal reflux disease and esophagitis     controlled with esomeprazole.  EGD [facility] 6/18 HH, gr II esophagitis, superf antral ulcerations, 35 cm zline, path anturm neg h. pyl, stool ag 5/18 neg h. pyl.  path DE reflux   • History of blood transfusion     as an infant, unknown season.    • Hypertension    • Hypothyroidism    • Insomnia    • Iron deficiency     on iron supplements   • Joint pain     effexor helps. No NSAIDS, no injections.    • Kidney function abnormal     \"little low\" on previous labs, advised increased hydration   • Lower back pain    • Obesity 1978   • RADHA (obstructive sleep apnea)     Non CPAP compliant   • Polyp of sigmoid colon    • Prediabetes     Hb A1C 6.10 5/18   • Visual impairment 50 years    wear contacts/glasses     Past Surgical History:   Procedure Laterality Date   • ADENOIDECTOMY  1960 or 1962   • BARIATRIC SURGERY  " 11/2018   • BREAST SURGERY Right 2010    Reduction   • CHOLECYSTECTOMY N/A 11/21/2018    Procedure: CHOLECYSTECTOMY LAPAROSCOPIC;  Surgeon: Maksim Jhaveri MD;  Location:  [facility];  Service: Bariatric   • COLONOSCOPY  2016    diverticulosis, h/p benign polyps   • COSMETIC SURGERY     • ENDOSCOPY     • ENDOSCOPY N/A 11/21/2018    Procedure: ESOPHAGOGASTRODUODENOSCOPY;  Surgeon: Maksim Jhaveri MD;  Location:  [facility];  Service: Bariatric   • EYE SURGERY  2017    left retina detach   • FRACTURE SURGERY  1965    broken left arm/wrist   • GASTRIC SLEEVE LAPAROSCOPIC N/A 11/21/2018    Procedure: GASTRIC SLEEVE LAPAROSCOPIC;  Surgeon: Maksim Jhaveri MD;  Location:  [facility];  Service: Bariatric   • HIATAL HERNIA REPAIR N/A 11/21/2018    Procedure: HIATAL HERNIA REPAIR LAPAROSCOPIC;  Surgeon: Maksim Jhaveri MD;  Location:  [facility];  Service: Bariatric   • JOINT REPLACEMENT  2017    left knee   • LYMPH NODE BIOPSY  2010   • MASTECTOMY WITH IMMEDIATE RECONSTRUCTION Left 2010   • REPLACEMENT TOTAL KNEE Left 08/2017    Dr Nova   • RETINAL DETACHMENT REPAIR Left 07/2017   • SKIN BIOPSY     • TONSILLECTOMY AND ADENOIDECTOMY  1960, 1962     Outpatient Medications Marked as Taking for the 9/16/19 encounter (Office Visit) with Emma Cid PA-C   Medication Sig Dispense Refill   • Ascorbic Acid (VITAMIN C ER PO) Take  by mouth.     • Cholecalciferol (VITAMIN D) 2000 units tablet      • Cyanocobalamin (VITAMIN B-12) 500 MCG lozenge      • FEROSUL 325 (65 Fe) MG tablet TAKE ONE TABLET BY MOUTH EVERY DAY WITH BREAKFAST 30 tablet 5   • FIBER ADULT GUMMIES PO      • levothyroxine (SYNTHROID) 112 MCG tablet Take 1 tablet by mouth Daily. 30 tablet 3   • losartan (COZAAR) 100 MG tablet TAKE ONE TABLET BY MOUTH EVERY DAY 30 tablet 5   • Multiple Vitamin (CALCIUM COMPLEX PO) Take  by mouth.     • omeprazole (priLOSEC) 20 MG capsule Take 1 capsule by mouth Daily. 30 capsule 5   • traZODone (DESYREL) 50 MG tablet  "Take 1-2 tablets by mouth Every Night. 60 tablet 5   • venlafaxine XR (EFFEXOR-XR) 150 MG 24 hr capsule TAKE ONE CAPSULE BY MOUTH EVERY DAY 30 capsule 5       Allergies   Allergen Reactions   • Cephalexin Hives and Itching     Tolerates PCN fine, tolerates other cephalosporins well.    • Sudafed [Pseudoephedrine Hcl] Itching     Scalp itching       Social History     Socioeconomic History   • Marital status: Single     Spouse name: Not on file   • Number of children: Not on file   • Years of education: Not on file   • Highest education level: Not on file   Occupational History   • Occupation: unemployed   Tobacco Use   • Smoking status: Never Smoker   • Smokeless tobacco: Never Used   Substance and Sexual Activity   • Alcohol use: No   • Drug use: No   • Sexual activity: No     Comment: no hormones   Social History Narrative    Lives in [place] with mother.  Works part times as  for [employer] in [place].        /76 (BP Location: Right arm, Patient Position: Sitting, Cuff Size: Adult)   Pulse 77   Temp 97 °F (36.1 °C) (Temporal)   Resp 18   Ht 151.1 cm (59.5\")   Wt 83.2 kg (183 lb 8 oz)   SpO2 98%   BMI 36.44 kg/m²     Physical Exam   Constitutional: She is oriented to person, place, and time. She appears well-developed and well-nourished.   HENT:   Head: Normocephalic and atraumatic.   Cardiovascular: Normal rate, regular rhythm and normal heart sounds.   Pulmonary/Chest: Effort normal and breath sounds normal. No respiratory distress. She has no wheezes.   Abdominal: Soft. Bowel sounds are normal. She exhibits no distension. There is no tenderness.   Neurological: She is alert and oriented to person, place, and time.   Skin: Skin is warm and dry.   Psychiatric: She has a normal mood and affect. Her behavior is normal. Judgment and thought content normal.         Assessment:  10 months s/p LSG/HHR/lalit by [facility] on 11/21/18    ICD-10-CM ICD-9-CM   1. Obesity, Class II, BMI 35-39.9 E66.9 278.00 "   2. Fatigue, unspecified type R53.83 780.79   3. Hypovitaminosis D E55.9 268.9   4. Screening, iron deficiency anemia Z13.0 V78.0   5. Malnutrition screen Z13.21 V77.2   6. Postgastrectomy malabsorption K91.2 579.3    Z90.3    7. Status post bariatric surgery Z98.84 V45.86         Plan:  Discussed goal per brandy, advised getting dietician info on checkout for consult. Continue w/ good food choices and healthy habits.  Continue to focus on high protein, low carb, cautioned eating out so often.  Keep tracking intake.  Encouraged routine exercise.  Routine bariatric labs ordered.  Continue vitamins w/ adjustments pending lab results.  Call w/ problems/concerns.     The patient was instructed to follow up in 3 months, sooner if needed.      Total time spent w/ patient 25 minutes and 15 minutes spent counseling the patient on nutrition and necessary dietary/lifestyle modifications.

## 2019-09-20 LAB
25(OH)D3+25(OH)D2 SERPL-MCNC: 56.3 NG/ML (ref 30–100)
ALBUMIN SERPL-MCNC: 4.4 G/DL (ref 3.6–4.8)
ALBUMIN/GLOB SERPL: 2.1 {RATIO} (ref 1.2–2.2)
ALP SERPL-CCNC: 101 IU/L (ref 39–117)
ALT SERPL-CCNC: 14 IU/L (ref 0–32)
AST SERPL-CCNC: 21 IU/L (ref 0–40)
BILIRUB SERPL-MCNC: 0.4 MG/DL (ref 0–1.2)
BUN SERPL-MCNC: 26 MG/DL (ref 8–27)
BUN/CREAT SERPL: 38 (ref 12–28)
CALCIUM SERPL-MCNC: 9.5 MG/DL (ref 8.7–10.3)
CHLORIDE SERPL-SCNC: 105 MMOL/L (ref 96–106)
CO2 SERPL-SCNC: 22 MMOL/L (ref 20–29)
CREAT SERPL-MCNC: 0.68 MG/DL (ref 0.57–1)
ERYTHROCYTE [DISTWIDTH] IN BLOOD BY AUTOMATED COUNT: 14.7 % (ref 12.3–15.4)
FERRITIN SERPL-MCNC: 83 NG/ML (ref 15–150)
FOLATE SERPL-MCNC: 18 NG/ML
GLOBULIN SER CALC-MCNC: 2.1 G/DL (ref 1.5–4.5)
GLUCOSE SERPL-MCNC: 87 MG/DL (ref 65–99)
HCT VFR BLD AUTO: 41.8 % (ref 34–46.6)
HGB BLD-MCNC: 14 G/DL (ref 11.1–15.9)
Lab: NORMAL
MCH RBC QN AUTO: 29.7 PG (ref 26.6–33)
MCHC RBC AUTO-ENTMCNC: 33.5 G/DL (ref 31.5–35.7)
MCV RBC AUTO: 89 FL (ref 79–97)
METHYLMALONATE SERPL-SCNC: 149 NMOL/L (ref 0–378)
PLATELET # BLD AUTO: 257 X10E3/UL (ref 150–450)
POTASSIUM SERPL-SCNC: 4.9 MMOL/L (ref 3.5–5.2)
PROT SERPL-MCNC: 6.5 G/DL (ref 6–8.5)
RBC # BLD AUTO: 4.72 X10E6/UL (ref 3.77–5.28)
SODIUM SERPL-SCNC: 144 MMOL/L (ref 134–144)
VIT B1 BLD-SCNC: 151.9 NMOL/L (ref 66.5–200)
WBC # BLD AUTO: 9 X10E3/UL (ref 3.4–10.8)

## 2019-10-29 ENCOUNTER — OFFICE VISIT (OUTPATIENT)
Dept: FAMILY MEDICINE CLINIC | Facility: CLINIC | Age: 64
End: 2019-10-29

## 2019-10-29 VITALS
OXYGEN SATURATION: 97 % | BODY MASS INDEX: 36.63 KG/M2 | TEMPERATURE: 98.2 F | DIASTOLIC BLOOD PRESSURE: 70 MMHG | HEIGHT: 60 IN | RESPIRATION RATE: 16 BRPM | HEART RATE: 80 BPM | SYSTOLIC BLOOD PRESSURE: 112 MMHG | WEIGHT: 186.6 LBS

## 2019-10-29 DIAGNOSIS — F41.9 ANXIETY: ICD-10-CM

## 2019-10-29 DIAGNOSIS — I10 ESSENTIAL HYPERTENSION: Primary | ICD-10-CM

## 2019-10-29 DIAGNOSIS — E03.9 ACQUIRED HYPOTHYROIDISM: ICD-10-CM

## 2019-10-29 LAB — TSH SERPL DL<=0.005 MIU/L-ACNC: 2.76 UIU/ML (ref 0.27–4.2)

## 2019-10-29 PROCEDURE — 99213 OFFICE O/P EST LOW 20 MIN: CPT | Performed by: FAMILY MEDICINE

## 2019-10-29 NOTE — PROGRESS NOTES
Assessment/Plan       Problems Addressed this Visit        Cardiovascular and Mediastinum    Hypertension - Primary       Endocrine    Hypothyroidism    Relevant Orders    TSH       Other    Anxiety            Follow up: Return for follow up depends on review of labs and testing.     DISCUSSION  Hypertension.  Blood pressure controlled.  Continue current medications.    Hypothyroidism.  Check TSH.    Anxiety.  Has had increased anxiety with recent stress with pending move.    Continue follow-up with bariatrics.    Recent blood work with him was good.      MEDICATIONS PRESCRIBED  Requested Prescriptions      No prescriptions requested or ordered in this encounter          -------------------------------------------    Subjective     Chief Complaint   Patient presents with   • Hypothyroidism     3 month follow up    • Hypertension         Hypothyroidism   This is a chronic problem. The current episode started more than 1 year ago. The problem occurs daily. Associated symptoms include fatigue (at times) and headaches (sinus headaches.  excedrin helps). Pertinent negatives include no chest pain, congestion, coughing, nausea or vomiting. Treatments tried: levothyroxine. The treatment provided moderate relief.   Hypertension   This is a chronic problem. The current episode started more than 1 year ago. The problem is unchanged. The problem is controlled. Associated symptoms include headaches (sinus headaches.  excedrin helps), malaise/fatigue and palpitations (occ and brief). Pertinent negatives include no chest pain or shortness of breath. There are no associated agents to hypertension. Current antihypertension treatment includes angiotensin blockers. The current treatment provides moderate improvement. There are no compliance problems.  There is no history of angina, kidney disease or CAD/MI. There is no history of chronic renal disease.       Is not on a good diet right now  Does not like to cook  Going to follow up  "with bariatric next month  Up 3 pounds since last time    Anxiety  Increased stress  On meds  Stable on meds  Moving in a month or so and stress with that        Social History     Tobacco Use   Smoking Status Never Smoker   Smokeless Tobacco Never Used          Past Medical History,Medications, Allergies, and social history was reviewed.             Review of Systems   Constitutional: Positive for fatigue (at times) and malaise/fatigue.   HENT: Negative for congestion.    Respiratory: Negative for cough and shortness of breath.    Cardiovascular: Positive for palpitations (occ and brief). Negative for chest pain.   Gastrointestinal: Negative for nausea and vomiting.       Objective     Vitals:    10/29/19 1045   BP: 112/70   Pulse: 80   Resp: 16   Temp: 98.2 °F (36.8 °C)   TempSrc: Temporal   SpO2: 97%   Weight: 84.6 kg (186 lb 9.6 oz)   Height: 151.1 cm (59.5\")          Physical Exam   Constitutional: She appears well-developed and well-nourished.   HENT:   Head: Normocephalic and atraumatic.   Right Ear: Hearing, tympanic membrane, external ear and ear canal normal.   Left Ear: Hearing, tympanic membrane, external ear and ear canal normal.   Mouth/Throat: Oropharynx is clear and moist.   Eyes: Conjunctivae and EOM are normal. Pupils are equal, round, and reactive to light.   Neck: Normal range of motion. Neck supple. No thyromegaly present.   Cardiovascular: Normal rate, regular rhythm and normal heart sounds. Exam reveals no gallop and no friction rub.   No murmur heard.  Pulmonary/Chest: Effort normal and breath sounds normal. No respiratory distress. She has no wheezes. She has no rales.   Abdominal: Soft. Bowel sounds are normal. She exhibits no distension. There is no tenderness. There is no rebound and no guarding.   Musculoskeletal: She exhibits no edema.   Neurological: She is alert.   Skin: Skin is warm and dry.   Psychiatric: She has a normal mood and affect.   Nursing note and vitals reviewed.          "       Masoud Martin MD

## 2019-11-11 DIAGNOSIS — E03.9 ACQUIRED HYPOTHYROIDISM: ICD-10-CM

## 2019-11-11 RX ORDER — LEVOTHYROXINE SODIUM 112 UG/1
TABLET ORAL
Qty: 30 TABLET | Refills: 5 | Status: SHIPPED | OUTPATIENT
Start: 2019-11-11 | End: 2020-04-27

## 2019-11-12 DIAGNOSIS — E03.9 ACQUIRED HYPOTHYROIDISM: ICD-10-CM

## 2019-11-12 RX ORDER — LEVOTHYROXINE SODIUM 112 UG/1
TABLET ORAL
Qty: 30 TABLET | Refills: 0 | OUTPATIENT
Start: 2019-11-12

## 2019-11-13 DIAGNOSIS — E03.9 ACQUIRED HYPOTHYROIDISM: ICD-10-CM

## 2019-11-13 RX ORDER — LEVOTHYROXINE SODIUM 112 UG/1
TABLET ORAL
Qty: 30 TABLET | Refills: 0 | OUTPATIENT
Start: 2019-11-13

## 2019-12-10 ENCOUNTER — OFFICE VISIT (OUTPATIENT)
Dept: FAMILY MEDICINE CLINIC | Facility: CLINIC | Age: 64
End: 2019-12-10

## 2019-12-10 VITALS
BODY MASS INDEX: 36.52 KG/M2 | SYSTOLIC BLOOD PRESSURE: 118 MMHG | HEIGHT: 60 IN | WEIGHT: 186 LBS | DIASTOLIC BLOOD PRESSURE: 62 MMHG | HEART RATE: 74 BPM | RESPIRATION RATE: 18 BRPM | TEMPERATURE: 97.8 F

## 2019-12-10 DIAGNOSIS — M25.50 ARTHRALGIA OF MULTIPLE JOINTS: ICD-10-CM

## 2019-12-10 DIAGNOSIS — J06.9 PROTRACTED URI: Primary | ICD-10-CM

## 2019-12-10 PROCEDURE — 99214 OFFICE O/P EST MOD 30 MIN: CPT | Performed by: FAMILY MEDICINE

## 2019-12-10 RX ORDER — AZITHROMYCIN 250 MG/1
TABLET, FILM COATED ORAL
Qty: 6 TABLET | Refills: 0 | Status: SHIPPED | OUTPATIENT
Start: 2019-12-10 | End: 2020-02-28

## 2019-12-10 NOTE — PROGRESS NOTES
Assessment/Plan       Problems Addressed this Visit     None      Visit Diagnoses     Protracted URI    -  Primary    Relevant Medications    azithromycin (ZITHROMAX) 250 MG tablet    Arthralgia of multiple joints        Relevant Orders    CBC & Differential    Sedimentation Rate    Uric Acid    Rheumatoid Factor    C-reactive Protein            Follow up: Return if symptoms worsen or fail to improve.     DISCUSSION  Persistent upper respiratory infection.  Start Z-Justin.  Continue increase fluids.  Rest.  Call follow-up if not improving.    Arthralgia of multiple joints.  Check labs to evaluate for inflammatory arthritis or rheumatoid arthritis.  Further plan once labs back.           MEDICATIONS PRESCRIBED  Requested Prescriptions     Signed Prescriptions Disp Refills   • azithromycin (ZITHROMAX) 250 MG tablet 6 tablet 0     Sig: Take 2 tablets the first day, then 1 tablet daily for 4 days.            -------------------------------------------    Subjective     Chief Complaint   Patient presents with   • Nasal Congestion   • URI         URI    This is a new problem. The current episode started in the past 7 days. The problem has been gradually worsening. There has been no fever (felt hot). Associated symptoms include congestion (green), coughing ( green), ear pain (alternate), a plugged ear sensation, a sore throat (started as sore throat and better now) and wheezing (little). Pertinent negatives include no nausea, sinus pain or vomiting. Treatments tried: has been taking Nadya seltzer and no help. The treatment provided no relief.     =================  Joint pain   In hands and feet  proximal joints  And big toe  swelling at the end if the day   FH: no RA that she knows of   + OA  Has bunion left foot  Right foot is ok          Social History     Tobacco Use   Smoking Status Never Smoker   Smokeless Tobacco Never Used          Past Medical History,Medications, Allergies, and social history was  "reviewed.          Review of Systems   Constitutional: Negative.    HENT: Positive for congestion (green), ear pain (alternate) and sore throat (started as sore throat and better now).    Respiratory: Positive for cough ( green) and wheezing (little).    Cardiovascular: Negative.    Gastrointestinal: Negative.  Negative for nausea and vomiting.   Musculoskeletal: Negative.    Neurological: Negative.    Psychiatric/Behavioral: Negative.        Objective     Vitals:    12/10/19 1144   BP: 118/62   Pulse: 74   Resp: 18   Temp: 97.8 °F (36.6 °C)   Weight: 84.4 kg (186 lb)   Height: 151.1 cm (59.5\")          Physical Exam   Constitutional: She is oriented to person, place, and time. She appears well-developed and well-nourished.   HENT:   Head: Normocephalic and atraumatic.   Right Ear: Hearing and external ear normal.   Left Ear: Hearing and external ear normal.   Mouth/Throat: Oropharynx is clear and moist.   Eyes: Pupils are equal, round, and reactive to light. Conjunctivae and EOM are normal.   Cardiovascular: Normal rate, regular rhythm and normal heart sounds. Exam reveals no friction rub.   No murmur heard.  Pulmonary/Chest: Effort normal. No respiratory distress. She has no wheezes. She has rhonchi (with cough). She has no rales.   Neurological: She is alert and oriented to person, place, and time.   Skin: Skin is warm.   Psychiatric: She has a normal mood and affect. Her behavior is normal.   Nursing note and vitals reviewed.    + tenderness of the proximal joints of the hands and at base of thumb  + mild swelling  Not red or hot    Significant bunion left foot medial                Masoud Martin MD    "

## 2019-12-11 LAB
BASOPHILS # BLD AUTO: 0.04 10*3/MM3 (ref 0–0.2)
BASOPHILS NFR BLD AUTO: 0.5 % (ref 0–1.5)
CRP SERPL-MCNC: 6.57 MG/DL (ref 0–0.5)
EOSINOPHIL # BLD AUTO: 0.3 10*3/MM3 (ref 0–0.4)
EOSINOPHIL NFR BLD AUTO: 3.5 % (ref 0.3–6.2)
ERYTHROCYTE [DISTWIDTH] IN BLOOD BY AUTOMATED COUNT: 13.1 % (ref 12.3–15.4)
ERYTHROCYTE [SEDIMENTATION RATE] IN BLOOD BY WESTERGREN METHOD: 25 MM/HR (ref 0–30)
HCT VFR BLD AUTO: 40.9 % (ref 34–46.6)
HGB BLD-MCNC: 13.7 G/DL (ref 12–15.9)
IMM GRANULOCYTES # BLD AUTO: 0.03 10*3/MM3 (ref 0–0.05)
IMM GRANULOCYTES NFR BLD AUTO: 0.3 % (ref 0–0.5)
LYMPHOCYTES # BLD AUTO: 2.31 10*3/MM3 (ref 0.7–3.1)
LYMPHOCYTES NFR BLD AUTO: 26.6 % (ref 19.6–45.3)
MCH RBC QN AUTO: 30.3 PG (ref 26.6–33)
MCHC RBC AUTO-ENTMCNC: 33.5 G/DL (ref 31.5–35.7)
MCV RBC AUTO: 90.5 FL (ref 79–97)
MONOCYTES # BLD AUTO: 0.72 10*3/MM3 (ref 0.1–0.9)
MONOCYTES NFR BLD AUTO: 8.3 % (ref 5–12)
NEUTROPHILS # BLD AUTO: 5.27 10*3/MM3 (ref 1.7–7)
NEUTROPHILS NFR BLD AUTO: 60.8 % (ref 42.7–76)
NRBC BLD AUTO-RTO: 0 /100 WBC (ref 0–0.2)
PLATELET # BLD AUTO: 232 10*3/MM3 (ref 140–450)
RBC # BLD AUTO: 4.52 10*6/MM3 (ref 3.77–5.28)
RHEUMATOID FACT SERPL-ACNC: <10 IU/ML (ref 0–13.9)
URATE SERPL-MCNC: 2.9 MG/DL (ref 2.4–5.7)
WBC # BLD AUTO: 8.67 10*3/MM3 (ref 3.4–10.8)

## 2020-01-06 DIAGNOSIS — E61.1 IRON DEFICIENCY: ICD-10-CM

## 2020-01-06 RX ORDER — FERROUS SULFATE 325(65) MG
TABLET ORAL
Qty: 30 TABLET | Refills: 1 | Status: SHIPPED | OUTPATIENT
Start: 2020-01-06 | End: 2020-01-30

## 2020-01-29 DIAGNOSIS — I10 ESSENTIAL HYPERTENSION: ICD-10-CM

## 2020-01-29 DIAGNOSIS — E61.1 IRON DEFICIENCY: ICD-10-CM

## 2020-01-30 RX ORDER — FERROUS SULFATE 325(65) MG
TABLET ORAL
Qty: 30 TABLET | Refills: 0 | Status: SHIPPED | OUTPATIENT
Start: 2020-01-30 | End: 2020-02-03

## 2020-01-30 RX ORDER — LOSARTAN POTASSIUM 100 MG/1
TABLET ORAL
Qty: 30 TABLET | Refills: 0 | Status: SHIPPED | OUTPATIENT
Start: 2020-01-30 | End: 2020-02-03

## 2020-02-01 DIAGNOSIS — I10 ESSENTIAL HYPERTENSION: ICD-10-CM

## 2020-02-01 DIAGNOSIS — E61.1 IRON DEFICIENCY: ICD-10-CM

## 2020-02-03 RX ORDER — LOSARTAN POTASSIUM 100 MG/1
TABLET ORAL
Qty: 30 TABLET | Refills: 0 | Status: SHIPPED | OUTPATIENT
Start: 2020-02-03 | End: 2020-06-24

## 2020-02-03 RX ORDER — FERROUS SULFATE 325(65) MG
TABLET ORAL
Qty: 30 TABLET | Refills: 0 | Status: SHIPPED | OUTPATIENT
Start: 2020-02-03 | End: 2020-02-04 | Stop reason: SDUPTHER

## 2020-02-04 ENCOUNTER — TELEPHONE (OUTPATIENT)
Dept: FAMILY MEDICINE CLINIC | Facility: CLINIC | Age: 65
End: 2020-02-04

## 2020-02-04 DIAGNOSIS — F51.01 PRIMARY INSOMNIA: ICD-10-CM

## 2020-02-04 DIAGNOSIS — E61.1 IRON DEFICIENCY: ICD-10-CM

## 2020-02-04 RX ORDER — TRAZODONE HYDROCHLORIDE 50 MG/1
50-100 TABLET ORAL NIGHTLY
Qty: 60 TABLET | Refills: 2 | Status: SHIPPED | OUTPATIENT
Start: 2020-02-04 | End: 2020-04-27

## 2020-02-04 RX ORDER — FERROUS SULFATE 325(65) MG
1 TABLET ORAL
Qty: 30 TABLET | Refills: 2 | Status: SHIPPED | OUTPATIENT
Start: 2020-02-04 | End: 2020-04-27

## 2020-02-04 NOTE — TELEPHONE ENCOUNTER
PT called to request refills on the following medications:  · FEROSUL 325 (65 Fe) MG tablet  · traZODone (DESYREL) 50 MG tablet    Confirmed Pharmacy: [facility] - [address] [phone] [facility] [phone]

## 2020-02-28 ENCOUNTER — OFFICE VISIT (OUTPATIENT)
Dept: BARIATRICS/WEIGHT MGMT | Facility: CLINIC | Age: 65
End: 2020-02-28

## 2020-02-28 VITALS
BODY MASS INDEX: 35.24 KG/M2 | SYSTOLIC BLOOD PRESSURE: 118 MMHG | OXYGEN SATURATION: 98 % | WEIGHT: 179.5 LBS | DIASTOLIC BLOOD PRESSURE: 82 MMHG | TEMPERATURE: 97.1 F | HEART RATE: 92 BPM | HEIGHT: 60 IN | RESPIRATION RATE: 18 BRPM

## 2020-02-28 DIAGNOSIS — R79.0 ABNORMAL BLOOD LEVEL OF IRON: ICD-10-CM

## 2020-02-28 DIAGNOSIS — E55.9 VITAMIN D DEFICIENCY: ICD-10-CM

## 2020-02-28 DIAGNOSIS — R53.83 FATIGUE, UNSPECIFIED TYPE: ICD-10-CM

## 2020-02-28 DIAGNOSIS — E66.9 OBESITY, CLASS II, BMI 35-39.9: ICD-10-CM

## 2020-02-28 DIAGNOSIS — I10 ESSENTIAL HYPERTENSION: ICD-10-CM

## 2020-02-28 DIAGNOSIS — F41.9 ANXIETY: Primary | ICD-10-CM

## 2020-02-28 PROCEDURE — 99214 OFFICE O/P EST MOD 30 MIN: CPT | Performed by: PHYSICIAN ASSISTANT

## 2020-02-28 RX ORDER — DICLOFENAC SODIUM 1 MG/ML
SOLUTION/ DROPS OPHTHALMIC
COMMUNITY
Start: 2020-02-12 | End: 2020-05-28

## 2020-02-28 RX ORDER — MOXIFLOXACIN 5 MG/ML
SOLUTION/ DROPS OPHTHALMIC
COMMUNITY
Start: 2020-02-13 | End: 2020-05-28

## 2020-02-28 RX ORDER — PREDNISOLONE ACETATE 10 MG/ML
SUSPENSION/ DROPS OPHTHALMIC
COMMUNITY
Start: 2020-02-13 | End: 2020-05-28

## 2020-02-28 NOTE — PROGRESS NOTES
[facility] Bariatric Surgery  [address]  [address]  [facility] [address]  [phone]      Patient Name:  Connie Lu.  :  1955      Date of Visit:  2020      Reason for Visit:  Annual Eval       HPI:  Connie Lu is a 64 y.o. female s/p LSG/HHR/lalit by [facility] on 18    Feeling good, but wishes she had made better progress at this point.  Admits she continues to struggle w/ stress, emotional eating and lack of motivation.  Met w/ the dietitian earlier, but says gets overwhelmed w/ the instructions.  Really just needs someone to tell her exactly what to eat.  Does not cook, does not really want to, but says she will if it could be simple.  As such, not tracking intake.  Also still not exercising.  Has some equipment at home but lacks the motivation to use it.  Really bothered by the excess skin on her arms and abdomen.  Says she knows that likely will not improve w/ exercise, thus this discourages her further.     Denies dysphagia, reflux, nausea, vomiting and abdominal pain.  Continues on Omeprazole 20mg daily (was taking prior to surgery as well), has not attempted to DC.  Continues on MVI, Vit B12, Vit D and iron.  Last bariatric labs 19 - WNL.       Presurgery weight: 226 pounds.  Today's weight is 81.4 kg (179 lb 8 oz) pounds, today's  Body mass index is 35.65 kg/m²., and her weight loss since surgery is 47 pounds.         Past Medical History:   Diagnosis Date   • ADHD (attention deficit hyperactivity disorder) 30+ yrs ago    no action taken   • Allergic annual    sinus infections   • Anemia 2018    approximately   • Anxiety    • Arthritis 2017    Knee replaced Dr Fontenot   • Asthma     worse with heat, has inhaler for rare need   • Attention deficit disorder    • Boil     states it was not MRSA, once, many years ago   • Breast cancer (CMS/[facility]) 2010    estrogen driven. S/p left mastectomy and right reduction. No requirements for  "radiation or chemo.  5 yrs tamoxifen   • Carpal tunnel syndrome    • Chronic cholecystitis with calculus     US stones Has epi pain, nausea   • Constipation     on linzess   • Depression    • Diverticulosis     noted on colonoscopy   • Gallstone    • Heart murmur     Has seen cardiologist 2017, had preop workup for left TKR   • Hiatal hernia with gastroesophageal reflux disease and esophagitis     controlled with esomeprazole.  EGD [facility] 6/18 HH, gr II esophagitis, superf antral ulcerations, 35 cm zline, path anturm neg h. pyl, stool ag 5/18 neg h. pyl.  path DE reflux   • History of blood transfusion     as an infant, unknown season.    • Hypertension    • Hypothyroidism    • Insomnia    • Iron deficiency     on iron supplements   • Joint pain     effexor helps. No NSAIDS, no injections.    • Kidney function abnormal     \"little low\" on previous labs, advised increased hydration   • Lower back pain    • Obesity 1978   • RADHA (obstructive sleep apnea)     Non CPAP compliant   • Polyp of sigmoid colon    • Prediabetes     Hb A1C 6.10 5/18   • Visual impairment 50 years    wear contacts/glasses     Past Surgical History:   Procedure Laterality Date   • ADENOIDECTOMY  1960 or 1962   • BARIATRIC SURGERY  11/2018   • BREAST SURGERY Right 2010    Reduction   • CATARACT EXTRACTION     • CHOLECYSTECTOMY N/A 11/21/2018    Procedure: CHOLECYSTECTOMY LAPAROSCOPIC;  Surgeon: Maksim Jhaveri MD;  Location:  [facility];  Service: Bariatric   • COLONOSCOPY  2016    diverticulosis, h/p benign polyps   • COSMETIC SURGERY     • ENDOSCOPY     • ENDOSCOPY N/A 11/21/2018    Procedure: ESOPHAGOGASTRODUODENOSCOPY;  Surgeon: Maksim Jhaveri MD;  Location:  [facility];  Service: Bariatric   • EYE SURGERY  2017    left retina detach   • FRACTURE SURGERY  1965    broken left arm/wrist   • GASTRIC SLEEVE LAPAROSCOPIC N/A 11/21/2018    Procedure: GASTRIC SLEEVE LAPAROSCOPIC;  Surgeon: Maksim Jhaveri MD;  Location:  [facility];  Service: " Bariatric   • HIATAL HERNIA REPAIR N/A 11/21/2018    Procedure: HIATAL HERNIA REPAIR LAPAROSCOPIC;  Surgeon: Maksim Jhaveri MD;  Location: [facility];  Service: Bariatric   • JOINT REPLACEMENT  2017    left knee   • LYMPH NODE BIOPSY  2010   • MASTECTOMY WITH IMMEDIATE RECONSTRUCTION Left 2010   • REPLACEMENT TOTAL KNEE Left 08/2017    Dr Nova   • RETINAL DETACHMENT REPAIR Left 07/2017   • SKIN BIOPSY     • TONSILLECTOMY AND ADENOIDECTOMY  1960, 1962     Outpatient Medications Marked as Taking for the 2/28/20 encounter (Office Visit) with Ashlee Encarnacion PA   Medication Sig Dispense Refill   • Ascorbic Acid (VITAMIN C ER PO) Take  by mouth.     • Cholecalciferol (VITAMIN D) 2000 units tablet      • Cyanocobalamin (VITAMIN B-12) 500 MCG lozenge      • diclofenac (VOLTAREN) 0.1 % ophthalmic solution      • ferrous sulfate (FEROSUL) 325 (65 FE) MG tablet Take 1 tablet by mouth Daily With Breakfast. 30 tablet 2   • FIBER ADULT GUMMIES PO      • levothyroxine (SYNTHROID, LEVOTHROID) 112 MCG tablet TAKE ONE TABLET BY MOUTH EVERY DAY 30 tablet 5   • losartan (COZAAR) 100 MG tablet TAKE ONE TABLET BY MOUTH EVERY DAY 30 tablet 0   • moxifloxacin (VIGAMOX) 0.5 % ophthalmic solution      • Multiple Vitamin (CALCIUM COMPLEX PO) Take  by mouth.     • omeprazole (priLOSEC) 20 MG capsule Take 1 capsule by mouth Daily. 30 capsule 5   • ondansetron ODT (ZOFRAN-ODT) 4 MG disintegrating tablet Take 4 mg by mouth.     • prednisoLONE acetate (PRED FORTE) 1 % ophthalmic suspension      • traZODone (DESYREL) 50 MG tablet Take 1-2 tablets by mouth Every Night. 60 tablet 2   • venlafaxine XR (EFFEXOR-XR) 150 MG 24 hr capsule TAKE ONE CAPSULE BY MOUTH EVERY DAY 30 capsule 5     Allergies   Allergen Reactions   • Cephalexin Hives and Itching     Tolerates PCN fine, tolerates other cephalosporins well.    • Sudafed [Pseudoephedrine Hcl] Itching     Scalp itching       Social History     Socioeconomic History   • Marital status:  "Single     Spouse name: Not on file   • Number of children: Not on file   • Years of education: Not on file   • Highest education level: Not on file   Occupational History   • Occupation: unemployed   Tobacco Use   • Smoking status: Never Smoker   • Smokeless tobacco: Never Used   Substance and Sexual Activity   • Alcohol use: No   • Drug use: No   • Sexual activity: Never     Comment: no hormones   Social History Narrative    Lives in [place] with mother.  Works part times as  for [employer] in [place].        /82   Pulse 92   Temp 97.1 °F (36.2 °C) (Temporal)   Resp 18   Ht 151.1 cm (59.5\")   Wt 81.4 kg (179 lb 8 oz)   SpO2 98%   BMI 35.65 kg/m²   Physical Exam   Constitutional: She appears well-developed and well-nourished.   Cardiovascular: Normal rate.   Pulmonary/Chest: Effort normal.   Musculoskeletal: Normal range of motion.   Neurological: She is alert.   Psychiatric: She has a normal mood and affect. Judgment and thought content normal.         Assessment:  15 months s/p LSG/HHR/lalit by [facility] on 11/21/18    ICD-10-CM ICD-9-CM   1. Fatigue, unspecified type R53.83 780.79   2. Vitamin D deficiency E55.9 268.9   3. Abnormal blood level of iron R79.0 790.6   4. Essential hypertension I10 401.9   5. Obesity, Class II, BMI 35-39.9 E66.9 278.00       Plan:   Encouraged patient to refocus on healthy habits.  Will refer to nutrition counseling.  Also given information regarding [employer] online program.  Encouraged to commit to daily exercise and handout for home exercises shared w/ patient.  Contact information for "[employer] Plastics given to patient - encouraged to schedule a consultation to discuss options further.  Lastly, encouraged to pursue psych counseling given ongoing stress/eating issues - website for psychology today shared w/ patient.  Bariatric labs ordered.  Simplify vitamin regimen to MVI (+ B12 per patient preference).  Further input pending results.     The patient was " instructed to follow up in 3 months, sooner if needed.

## 2020-03-04 LAB
25(OH)D3+25(OH)D2 SERPL-MCNC: 49.2 NG/ML (ref 30–100)
ALBUMIN SERPL-MCNC: 4.2 G/DL (ref 3.8–4.8)
ALBUMIN/GLOB SERPL: 1.7 {RATIO} (ref 1.2–2.2)
ALP SERPL-CCNC: 86 IU/L (ref 39–117)
ALT SERPL-CCNC: 16 IU/L (ref 0–32)
AST SERPL-CCNC: 21 IU/L (ref 0–40)
BASOPHILS # BLD AUTO: 0 X10E3/UL (ref 0–0.2)
BASOPHILS NFR BLD AUTO: 0 %
BILIRUB SERPL-MCNC: 0.3 MG/DL (ref 0–1.2)
BUN SERPL-MCNC: 27 MG/DL (ref 8–27)
BUN/CREAT SERPL: 35 (ref 12–28)
CALCIUM SERPL-MCNC: 10.1 MG/DL (ref 8.7–10.3)
CHLORIDE SERPL-SCNC: 102 MMOL/L (ref 96–106)
CO2 SERPL-SCNC: 27 MMOL/L (ref 20–29)
CREAT SERPL-MCNC: 0.77 MG/DL (ref 0.57–1)
EOSINOPHIL # BLD AUTO: 0.2 X10E3/UL (ref 0–0.4)
EOSINOPHIL NFR BLD AUTO: 3 %
ERYTHROCYTE [DISTWIDTH] IN BLOOD BY AUTOMATED COUNT: 13.6 % (ref 11.7–15.4)
FERRITIN SERPL-MCNC: 76 NG/ML (ref 15–150)
FOLATE SERPL-MCNC: 14.1 NG/ML
GLOBULIN SER CALC-MCNC: 2.5 G/DL (ref 1.5–4.5)
GLUCOSE SERPL-MCNC: 87 MG/DL (ref 65–99)
HCT VFR BLD AUTO: 46.2 % (ref 34–46.6)
HGB BLD-MCNC: 15.5 G/DL (ref 11.1–15.9)
IMM GRANULOCYTES # BLD AUTO: 0 X10E3/UL (ref 0–0.1)
IMM GRANULOCYTES NFR BLD AUTO: 0 %
IRON SERPL-MCNC: 109 UG/DL (ref 27–139)
LYMPHOCYTES # BLD AUTO: 2.3 X10E3/UL (ref 0.7–3.1)
LYMPHOCYTES NFR BLD AUTO: 27 %
Lab: NORMAL
MCH RBC QN AUTO: 30.7 PG (ref 26.6–33)
MCHC RBC AUTO-ENTMCNC: 33.5 G/DL (ref 31.5–35.7)
MCV RBC AUTO: 92 FL (ref 79–97)
METHYLMALONATE SERPL-SCNC: 187 NMOL/L (ref 0–378)
MONOCYTES # BLD AUTO: 0.7 X10E3/UL (ref 0.1–0.9)
MONOCYTES NFR BLD AUTO: 8 %
NEUTROPHILS # BLD AUTO: 5.1 X10E3/UL (ref 1.4–7)
NEUTROPHILS NFR BLD AUTO: 62 %
PLATELET # BLD AUTO: 258 X10E3/UL (ref 150–450)
POTASSIUM SERPL-SCNC: 5.7 MMOL/L (ref 3.5–5.2)
PREALB SERPL-MCNC: 21 MG/DL (ref 10–36)
PROT SERPL-MCNC: 6.7 G/DL (ref 6–8.5)
RBC # BLD AUTO: 5.05 X10E6/UL (ref 3.77–5.28)
SODIUM SERPL-SCNC: 142 MMOL/L (ref 134–144)
VIT B1 BLD-SCNC: 176.7 NMOL/L (ref 66.5–200)
WBC # BLD AUTO: 8.3 X10E3/UL (ref 3.4–10.8)

## 2020-03-23 ENCOUNTER — APPOINTMENT (OUTPATIENT)
Dept: NUTRITION | Facility: HOSPITAL | Age: 65
End: 2020-03-23

## 2020-04-24 ENCOUNTER — APPOINTMENT (OUTPATIENT)
Dept: NUTRITION | Facility: HOSPITAL | Age: 65
End: 2020-04-24

## 2020-04-27 DIAGNOSIS — E61.1 IRON DEFICIENCY: ICD-10-CM

## 2020-04-27 DIAGNOSIS — E03.9 ACQUIRED HYPOTHYROIDISM: ICD-10-CM

## 2020-04-27 DIAGNOSIS — F51.01 PRIMARY INSOMNIA: ICD-10-CM

## 2020-04-27 RX ORDER — PNV NO.95/FERROUS FUM/FOLIC AC 28MG-0.8MG
TABLET ORAL
Qty: 30 TABLET | Refills: 0 | Status: SHIPPED | OUTPATIENT
Start: 2020-04-27 | End: 2020-05-26

## 2020-04-27 RX ORDER — LEVOTHYROXINE SODIUM 112 UG/1
TABLET ORAL
Qty: 30 TABLET | Refills: 0 | Status: SHIPPED | OUTPATIENT
Start: 2020-04-27 | End: 2020-05-26

## 2020-04-27 RX ORDER — TRAZODONE HYDROCHLORIDE 50 MG/1
TABLET ORAL
Qty: 60 TABLET | Refills: 0 | Status: SHIPPED | OUTPATIENT
Start: 2020-04-27 | End: 2020-05-26

## 2020-05-20 ENCOUNTER — HOSPITAL ENCOUNTER (OUTPATIENT)
Dept: NUTRITION | Facility: HOSPITAL | Age: 65
Setting detail: RECURRING SERIES
Discharge: HOME | End: 2020-05-20

## 2020-05-20 VITALS — HEIGHT: 60 IN | BODY MASS INDEX: 35.34 KG/M2 | WEIGHT: 180 LBS

## 2020-05-20 PROCEDURE — 97802 MEDICAL NUTRITION INDIV IN: CPT

## 2020-05-26 DIAGNOSIS — F51.01 PRIMARY INSOMNIA: ICD-10-CM

## 2020-05-26 DIAGNOSIS — E61.1 IRON DEFICIENCY: ICD-10-CM

## 2020-05-26 DIAGNOSIS — E03.9 ACQUIRED HYPOTHYROIDISM: ICD-10-CM

## 2020-05-26 RX ORDER — PNV NO.95/FERROUS FUM/FOLIC AC 28MG-0.8MG
TABLET ORAL
Qty: 30 TABLET | Refills: 0 | Status: SHIPPED | OUTPATIENT
Start: 2020-05-26 | End: 2020-06-22

## 2020-05-26 RX ORDER — PNV NO.95/FERROUS FUM/FOLIC AC 28MG-0.8MG
TABLET ORAL
Qty: 30 TABLET | Refills: 0 | OUTPATIENT
Start: 2020-05-26

## 2020-05-26 RX ORDER — LEVOTHYROXINE SODIUM 112 UG/1
TABLET ORAL
Qty: 30 TABLET | Refills: 0 | Status: SHIPPED | OUTPATIENT
Start: 2020-05-26 | End: 2020-06-22

## 2020-05-26 RX ORDER — TRAZODONE HYDROCHLORIDE 50 MG/1
TABLET ORAL
Qty: 60 TABLET | Refills: 0 | Status: SHIPPED | OUTPATIENT
Start: 2020-05-26 | End: 2020-06-22

## 2020-05-28 ENCOUNTER — OFFICE VISIT (OUTPATIENT)
Dept: BARIATRICS/WEIGHT MGMT | Facility: CLINIC | Age: 65
End: 2020-05-28

## 2020-05-28 VITALS
HEIGHT: 60 IN | RESPIRATION RATE: 18 BRPM | DIASTOLIC BLOOD PRESSURE: 70 MMHG | HEART RATE: 74 BPM | SYSTOLIC BLOOD PRESSURE: 122 MMHG | BODY MASS INDEX: 36.02 KG/M2 | TEMPERATURE: 97.6 F | OXYGEN SATURATION: 99 % | WEIGHT: 183.5 LBS

## 2020-05-28 DIAGNOSIS — Z98.84 STATUS POST BARIATRIC SURGERY: ICD-10-CM

## 2020-05-28 DIAGNOSIS — E66.9 OBESITY, CLASS II, BMI 35-39.9: Primary | ICD-10-CM

## 2020-05-28 PROCEDURE — 99214 OFFICE O/P EST MOD 30 MIN: CPT | Performed by: PHYSICIAN ASSISTANT

## 2020-05-28 NOTE — PROGRESS NOTES
[facility] Bariatric Surgery  [address]  [address]  [facility] [address]  [phone]        Patient Name:  Connie Lu.  :  1955        Reason for Visit:   Annual Eval  18 months postop    HPI: Connie Lu is a 65 y.o. female  s/p LSG/HHR/llait by [facility] on 18    LOV 2020- doing well, struggling with eating habits, weight 179lb    Doing well.  Discouraged with her weight gain. Having a hard time with motivation to keep her habits in check. Finding herself eating a lot of sweets/ chocolate PB M&Ms. Thinks she may need a counselor/ accountability couch. Has some lower GI cramping preceding BM usually after coffee with creamer, has seen GI. No other issues/concerns. Denies dysphagia, reflux, nausea and vomiting.  Goal of 70g prot/day, occ protein supplements, not really tracking. Cottage cheese and fruit for breakfast every day. occ egg/ gonzalez biscuit. Lunch: sandwich with meat and fruit. Dinner: salad, cauliflower pizza.  Evening- peanut mix or ice cream.  M&Ms as snacks. Not many vegetables.  Drinking 64+ fluid oz/day, water or protein water.  Last labs revealed potassium elevated .  Taking MVI, B12, Vit D and Vit C.  On Omeprazole 20mg.  Not exercising, is doing a lot of mowing/ yard work. Bought exercise videos to start but lacks motivation.      Presurgery weight: 226 pounds.  Today's weight is 83.2 kg (183 lb 8 oz) pounds, today's  Body mass index is 36.44 kg/m²., and her weight loss since surgery is 43 pounds.      Past Medical History:   Diagnosis Date   • ADHD (attention deficit hyperactivity disorder) 30+ yrs ago    no action taken   • Allergic annual    sinus infections   • Anemia 2018    approximately   • Anxiety    • Arthritis 2017    Knee replaced Dr Fontenot   • Asthma     worse with heat, has inhaler for rare need   • Attention deficit disorder    • Boil     states it was not MRSA, once, many years ago   • Breast cancer (CMS/[facility])  "2010    estrogen driven. S/p left mastectomy and right reduction. No requirements for radiation or chemo.  5 yrs tamoxifen   • Carpal tunnel syndrome    • Chronic cholecystitis with calculus     US stones Has epi pain, nausea   • Constipation     on linzess   • Depression    • Diverticulosis     noted on colonoscopy   • Gallstone    • Heart murmur     Has seen cardiologist 2017, had preop workup for left TKR   • Hiatal hernia with gastroesophageal reflux disease and esophagitis     controlled with esomeprazole.  EGD [facility] 6/18 HH, gr II esophagitis, superf antral ulcerations, 35 cm zline, path anturm neg h. pyl, stool ag 5/18 neg h. pyl.  path DE reflux   • History of blood transfusion     as an infant, unknown season.    • Hypertension    • Hypothyroidism    • Insomnia    • Iron deficiency     on iron supplements   • Joint pain     effexor helps. No NSAIDS, no injections.    • Kidney function abnormal     \"little low\" on previous labs, advised increased hydration   • Lower back pain    • Obesity 1978   • RADHA (obstructive sleep apnea)     Non CPAP compliant   • Polyp of sigmoid colon    • Prediabetes     Hb A1C 6.10 5/18   • Visual impairment 50 years    wear contacts/glasses     Past Surgical History:   Procedure Laterality Date   • ADENOIDECTOMY  1960 or 1962   • BARIATRIC SURGERY  11/2018   • BREAST SURGERY Right 2010    Reduction   • CATARACT EXTRACTION     • CHOLECYSTECTOMY N/A 11/21/2018    Procedure: CHOLECYSTECTOMY LAPAROSCOPIC;  Surgeon: Maksim Jhaveri MD;  Location:  [facility];  Service: Bariatric   • COLONOSCOPY  2016    diverticulosis, h/p benign polyps   • COSMETIC SURGERY     • ENDOSCOPY     • ENDOSCOPY N/A 11/21/2018    Procedure: ESOPHAGOGASTRODUODENOSCOPY;  Surgeon: Maksim Jhaveri MD;  Location:  [facility];  Service: Bariatric   • EYE SURGERY  2017    left retina detach   • FRACTURE SURGERY  1965    broken left arm/wrist   • GASTRIC SLEEVE LAPAROSCOPIC N/A 11/21/2018    Procedure: GASTRIC " SLEEVE LAPAROSCOPIC;  Surgeon: Maksim Jhaveri MD;  Location:  [facility];  Service: Bariatric   • HIATAL HERNIA REPAIR N/A 11/21/2018    Procedure: HIATAL HERNIA REPAIR LAPAROSCOPIC;  Surgeon: Maksim Jhaveri MD;  Location:  [facility];  Service: Bariatric   • JOINT REPLACEMENT  2017    left knee   • LYMPH NODE BIOPSY  2010   • MASTECTOMY WITH IMMEDIATE RECONSTRUCTION Left 2010   • REPLACEMENT TOTAL KNEE Left 08/2017    Dr Nova   • RETINAL DETACHMENT REPAIR Left 07/2017   • SKIN BIOPSY     • TONSILLECTOMY AND ADENOIDECTOMY  1960, 1962     Outpatient Medications Marked as Taking for the 5/28/20 encounter (Office Visit) with Emma Cid PA-C   Medication Sig Dispense Refill   • Ascorbic Acid (VITAMIN C ER PO) Take  by mouth.     • Cholecalciferol (VITAMIN D) 2000 units tablet      • Cyanocobalamin (VITAMIN B-12) 500 MCG lozenge      • ferrous sulfate 325 (65 Fe) MG tablet TAKE ONE TABLET BY MOUTH EVERY DAY WITH BREAKFAST 30 tablet 0   • FIBER ADULT GUMMIES PO      • levothyroxine (SYNTHROID, LEVOTHROID) 112 MCG tablet TAKE ONE TABLET BY MOUTH EVERY DAY 30 tablet 0   • losartan (COZAAR) 100 MG tablet TAKE ONE TABLET BY MOUTH EVERY DAY 30 tablet 0   • Multiple Vitamin (CALCIUM COMPLEX PO) Take  by mouth.     • omeprazole (priLOSEC) 20 MG capsule Take 1 capsule by mouth Daily. 30 capsule 5   • traZODone (DESYREL) 50 MG tablet TAKE 1 TO 2 TABLETS BY MOUTH EVERY EVENING 60 tablet 0   • venlafaxine XR (EFFEXOR-XR) 150 MG 24 hr capsule TAKE ONE CAPSULE BY MOUTH EVERY DAY 30 capsule 5   • [DISCONTINUED] diclofenac (VOLTAREN) 0.1 % ophthalmic solution          Allergies   Allergen Reactions   • Cephalexin Hives and Itching     Tolerates PCN fine, tolerates other cephalosporins well.    • Sudafed [Pseudoephedrine Hcl] Itching     Scalp itching       Social History     Socioeconomic History   • Marital status: Single     Spouse name: Not on file   • Number of children: Not on file   • Years of education: Not on  "file   • Highest education level: Not on file   Occupational History   • Occupation: unemployed   Tobacco Use   • Smoking status: Never Smoker   • Smokeless tobacco: Never Used   Substance and Sexual Activity   • Alcohol use: No   • Drug use: No   • Sexual activity: Never     Comment: no hormones   Social History Narrative    Lives in [place] with mother.  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        /70 (BP Location: Left arm, Patient Position: Sitting, Cuff Size: Large Adult)   Pulse 74   Temp 97.6 °F (36.4 °C) (Temporal)   Resp 18   Ht 151.1 cm (59.5\")   Wt 83.2 kg (183 lb 8 oz)   SpO2 99%   BMI 36.44 kg/m²     Physical Exam   Constitutional: She is oriented to person, place, and time. She appears well-developed and well-nourished.   HENT:   Head: Normocephalic and atraumatic.   Cardiovascular: Normal rate and regular rhythm.   Pulmonary/Chest: Effort normal and breath sounds normal.   Abdominal: Soft. Bowel sounds are normal. She exhibits no distension. There is no tenderness.   Neurological: She is alert and oriented to person, place, and time.   Skin: Skin is warm and dry.   Psychiatric: She has a normal mood and affect. Her behavior is normal. Judgment and thought content normal.         Assessment:  18 months s/p LSG/HHR/lalit by [facility] on 11/21/18    ICD-10-CM ICD-9-CM   1. Obesity, Class II, BMI 35-39.9 E66.9 278.00   2. Status post bariatric surgery Z98.84 V45.86         Plan:  Doing well. Discussed ways to make improvement in diet.  Cut back sugar, more protein/ vegetable/  Healthy fat snacks.  Target calories per tanita. Start exercise. Continue w/ good food choices and healthy habits.  Continue to focus on high protein, low carb.  Start tracking intake, protein 70-100g. Routine bariatric labs deferred.  Continue vitamins, will check next visit. Call w/ problems/concerns.     The patient was instructed to follow up in 6 months, sooner if needed.      Total time spent w/ " patient 25 minutes and 15 minutes spent counseling the patient on nutrition and necessary dietary/lifestyle modifications.

## 2020-06-16 ENCOUNTER — APPOINTMENT (OUTPATIENT)
Dept: NUTRITION | Facility: HOSPITAL | Age: 65
End: 2020-06-16

## 2020-06-22 DIAGNOSIS — E61.1 IRON DEFICIENCY: ICD-10-CM

## 2020-06-22 DIAGNOSIS — F51.01 PRIMARY INSOMNIA: ICD-10-CM

## 2020-06-22 DIAGNOSIS — E03.9 ACQUIRED HYPOTHYROIDISM: ICD-10-CM

## 2020-06-22 RX ORDER — LEVOTHYROXINE SODIUM 112 UG/1
TABLET ORAL
Qty: 30 TABLET | Refills: 0 | Status: SHIPPED | OUTPATIENT
Start: 2020-06-22 | End: 2020-07-20

## 2020-06-22 RX ORDER — TRAZODONE HYDROCHLORIDE 50 MG/1
TABLET ORAL
Qty: 60 TABLET | Refills: 0 | Status: SHIPPED | OUTPATIENT
Start: 2020-06-22 | End: 2020-07-20

## 2020-06-22 RX ORDER — PNV NO.95/FERROUS FUM/FOLIC AC 28MG-0.8MG
TABLET ORAL
Qty: 30 TABLET | Refills: 0 | Status: SHIPPED | OUTPATIENT
Start: 2020-06-22 | End: 2020-07-13 | Stop reason: SDUPTHER

## 2020-06-23 ENCOUNTER — TELEMEDICINE (OUTPATIENT)
Dept: FAMILY MEDICINE CLINIC | Facility: CLINIC | Age: 65
End: 2020-06-23

## 2020-06-23 ENCOUNTER — TELEPHONE (OUTPATIENT)
Dept: FAMILY MEDICINE CLINIC | Facility: CLINIC | Age: 65
End: 2020-06-23

## 2020-06-23 DIAGNOSIS — J02.9 ACUTE PHARYNGITIS, UNSPECIFIED ETIOLOGY: Primary | ICD-10-CM

## 2020-06-23 DIAGNOSIS — R09.81 SINUS CONGESTION: ICD-10-CM

## 2020-06-23 DIAGNOSIS — H92.03 EARACHE SYMPTOMS, BILATERAL: ICD-10-CM

## 2020-06-23 PROCEDURE — 99213 OFFICE O/P EST LOW 20 MIN: CPT | Performed by: PHYSICIAN ASSISTANT

## 2020-06-23 RX ORDER — AZITHROMYCIN 250 MG/1
TABLET, FILM COATED ORAL
Qty: 6 TABLET | Refills: 0 | Status: SHIPPED | OUTPATIENT
Start: 2020-06-23 | End: 2020-07-13

## 2020-06-23 NOTE — PATIENT INSTRUCTIONS
Pharyngitis    Pharyngitis is redness, pain, and swelling (inflammation) of the throat (pharynx). It is a very common cause of sore throat. Pharyngitis can be caused by a bacteria, but it is usually caused by a virus. Most cases of pharyngitis get better on their own without treatment.  What are the causes?  This condition may be caused by:  · Infection by viruses (viral). Viral pharyngitis spreads from person to person (is contagious) through coughing, sneezing, and sharing of personal items or utensils such as cups, forks, spoons, and toothbrushes.  · Infection by bacteria (bacterial). Bacterial pharyngitis may be spread by touching the nose or face after coming in contact with the bacteria, or through more intimate contact, such as kissing.  · Allergies. Allergies can cause buildup of mucus in the throat (post-nasal drip), leading to inflammation and irritation. Allergies can also cause blocked nasal passages, forcing breathing through the mouth, which dries and irritates the throat.  What increases the risk?  You are more likely to develop this condition if:  · You are 5-24 years old.  · You are exposed to crowded environments such as , school, or dormitory living.  · You live in a cold climate.  · You have a weakened disease-fighting (immune) system.  What are the signs or symptoms?  Symptoms of this condition vary by the cause (viral, bacterial, or allergies) and can include:  · Sore throat.  · Fatigue.  · Low-grade fever.  · Headache.  · Joint pain and muscle aches.  · Skin rashes.  · Swollen glands in the throat (lymph nodes).  · Plaque-like film on the throat or tonsils. This is often a symptom of bacterial pharyngitis.  · Vomiting.  · Stuffy nose (nasal congestion).  · Cough.  · Red, itchy eyes (conjunctivitis).  · Loss of appetite.  How is this diagnosed?  This condition is often diagnosed based on your medical history and a physical exam. Your health care provider will ask you questions about your  illness and your symptoms. A swab of your throat may be done to check for bacteria (rapid strep test). Other lab tests may also be done, depending on the suspected cause, but these are rare.  How is this treated?  This condition usually gets better in 3-4 days without medicine. Bacterial pharyngitis may be treated with antibiotic medicines.  Follow these instructions at home:  · Take over-the-counter and prescription medicines only as told by your health care provider.  ? If you were prescribed an antibiotic medicine, take it as told by your health care provider. Do not stop taking the antibiotic even if you start to feel better.  ? Do not give children aspirin because of the association with Reye syndrome.  · Drink enough water and fluids to keep your urine clear or pale yellow.  · Get a lot of rest.  · Gargle with a salt-water mixture 3-4 times a day or as needed. To make a salt-water mixture, completely dissolve ½-1 tsp of salt in 1 cup of warm water.  · If your health care provider approves, you may use throat lozenges or sprays to soothe your throat.  Contact a health care provider if:  · You have large, tender lumps in your neck.  · You have a rash.  · You cough up green, yellow-brown, or bloody spit.  Get help right away if:  · Your neck becomes stiff.  · You drool or are unable to swallow liquids.  · You cannot drink or take medicines without vomiting.  · You have severe pain that does not go away, even after you take medicine.  · You have trouble breathing, and it is not caused by a stuffy nose.  · You have new pain and swelling in your joints such as the knees, ankles, wrists, or elbows.  Summary  · Pharyngitis is redness, pain, and swelling (inflammation) of the throat (pharynx).  · While pharyngitis can be caused by a bacteria, the most common causes are viral.  · Most cases of pharyngitis get better on their own without treatment.  · Bacterial pharyngitis is treated with antibiotic medicines.  This  information is not intended to replace advice given to you by your health care provider. Make sure you discuss any questions you have with your health care provider.  Document Released: 12/18/2006 Document Revised: 11/30/2018 Document Reviewed: 01/23/2018  Elsevier Patient Education © 2020 [employer]  How to Quarantine at Home  Information for Patients and Families    These instructions are for people with confirmed or suspected COVID-19 who do not need to be hospitalized and those with confirmed COVID-19 who were hospitalized and discharged to care for themselves at home.    If you were tested through the Health Department  The Health Department will monitor your wellbeing.  If it is determined that you do not need to be hospitalized and can be isolated at home, you will be monitored by staff from your local or state health department.     If you were tested through a Commercial [facility]  You will need to monitor yourself and report changes in your symptoms to your doctor.  See the section below called Monitor Your Symptoms.    Follow these steps until a healthcare provider or local or state health department says you can return to your normal activities.    Stay home except to get medical care  • Restrict activities outside your home, except for getting medical care.   • Do not go to work, school, or public areas.   • Avoid using public transportation, ride-sharing, or taxis.    Separate yourself from other people and animals in your home  People  As much as possible, you should stay in a specific room and away from other people in your home. Also, you should use a separate bathroom, if available.    Animals  You should restrict contact with pets and other animals while you are sick with COVID-19, just like you would around other people. When possible, have another member of your household care for your animals while you are sick. If you are sick with COVID-19, avoid contact with your pet, including petting,  snuggling, being kissed or licked, and sharing food. If you must care for your pet or be around animals while you are sick, wash your hands before and after you interact with pets and wear a facemask. See COVID-19 and Animals for more information.    Call ahead before visiting your doctor  If you have a medical appointment, call the healthcare provider and tell them that you have or may have COVID-19. This information will help the healthcare provider’s office take steps to keep other people from getting infected or exposed.    Wear a facemask  You should wear a facemask when you are around other people (e.g., sharing a room or vehicle) or pets and before you enter a healthcare provider’s office.     If you are not able to wear a facemask (for example, because it causes trouble breathing), then people who live with you should not stay in the same room with you, or they should wear a facemask if they enter your room.    Cover your coughs and sneezes  • Cover your mouth and nose with a tissue when you cough or sneeze.   • Throw used tissues in a lined trash can.   • Immediately wash your hands with soap and water for at least 20 seconds or, if soap and water are not available, clean your hands with an alcohol-based hand  that contains at least 60% alcohol.    Clean your hands often  • Wash your hands often with soap and water for at least 20 seconds, especially after blowing your nose, coughing, or sneezing; going to the bathroom; and before eating or preparing food.     • If soap and water are not readily available, use an alcohol-based hand  with at least 60% alcohol, covering all surfaces of your hands and rubbing them together until they feel dry.    • Soap and water are the best option if hands are visibly dirty. Avoid touching your eyes, nose, and mouth with unwashed hands.    Avoid sharing personal household items  • You should not share dishes, drinking glasses, cups, eating utensils, towels,  or bedding with other people or pets in your home.   • After using these items, they should be washed thoroughly with soap and water.    Clean all “high-touch” surfaces everyday  • High touch surfaces include counters, tabletops, doorknobs, bathroom fixtures, toilets, phones, keyboards, tablets, and bedside tables.   • Also, clean any surfaces that may have blood, stool, or body fluids on them.   • Use a household cleaning spray or wipe, according to the label instructions. Labels contain instructions for safe and effective use of the cleaning product, including precautions you should take when applying the product, such as wearing gloves and making sure you have good ventilation during use of the product.    Monitor your symptoms  • Seek prompt medical attention if your illness is worsening (e.g., difficulty breathing).   • Before seeking care, call your healthcare provider and tell them that you have, or are being evaluated for, COVID-19.   • Put on a facemask before you enter the facility.     • These steps will help the healthcare provider’s office to keep other people in the office or waiting room from getting infected or exposed.   • Persons who are placed under active monitoring or facilitated self-monitoring should follow instructions provided by their local health department or occupational health professionals, as appropriate.  • If you have a medical emergency and need to call 911, notify the dispatch personnel that you have, or are being evaluated for COVID-19. If possible, put on a facemask before emergency medical services arrive.    Discontinuing home isolation  Patients with confirmed COVID-19 should remain under home isolation precautions until the risk of secondary transmission to others is thought to be low. The decision to discontinue home isolation precautions should be made on a case-by-case basis, in consultation with healthcare providers and state and local health departments.    The below  content are for household members, intimate partners, and caregivers of a patient with symptomatic laboratory-confirmed COVID-19 or a patient under investigation:    Household members, intimate partners, and caregivers may have close contact with a person with symptomatic, laboratory-confirmed COVID-19 or a person under investigation.     Close contacts should monitor their health; they should call their healthcare provider right away if they develop symptoms suggestive of COVID-19 (e.g., fever, cough, shortness of breath)     Close contacts should also follow these recommendations:  • Make sure that you understand and can help the patient follow their healthcare provider’s instructions for medication(s) and care. You should help the patient with basic needs in the home and provide support for getting groceries, prescriptions, and other personal needs.  • Monitor the patient’s symptoms. If the patient is getting sicker, call his or her healthcare provider and tell them that the patient has laboratory-confirmed COVID-19. This will help the healthcare provider’s office take steps to keep other people in the office or waiting room from getting infected. Ask the healthcare provider to call the local or [facility] health department for additional guidance. If the patient has a medical emergency and you need to call 911, notify the dispatch personnel that the patient has, or is being evaluated for COVID-19.  • Household members should stay in another room or be  from the patient as much as possible. Household members should use a separate bedroom and bathroom, if available.  • Prohibit visitors who do not have an essential need to be in the home.  • Household members should care for any pets in the home. Do not handle pets or other animals while sick.  For more information, see COVID-19 and Animals.  • Make sure that shared spaces in the home have good air flow, such as by an air conditioner or an opened window,  weather permitting.  • Perform hand hygiene frequently. Wash your hands often with soap and water for at least 20 seconds or use an alcohol-based hand  that contains 60 to 95% alcohol, covering all surfaces of your hands and rubbing them together until they feel dry. Soap and water should be used preferentially if hands are visibly dirty.  • Avoid touching your eyes, nose, and mouth with unwashed hands.  • The patient should wear a facemask when you are around other people. If the patient is not able to wear a facemask (for example, because it causes trouble breathing), you, as the caregiver, should wear a mask when you are in the same room as the patient.  • Wear a disposable facemask and gloves when you touch or have contact with the patient’s blood, stool, or body fluids, such as saliva, sputum, nasal mucus, vomit, or urine.   o Throw out disposable facemasks and gloves after using them. Do not reuse.  o When removing personal protective equipment, first remove and dispose of gloves. Then, immediately clean your hands with soap and water or alcohol-based hand . Next, remove and dispose of facemask, and immediately clean your hands again with soap and water or alcohol-based hand .  • Avoid sharing household items with the patient. You should not share dishes, drinking glasses, cups, eating utensils, towels, bedding, or other items. After the patient uses these items, you should wash them thoroughly (see below “Wash laundry thoroughly”).  • Clean all “high-touch” surfaces, such as counters, tabletops, doorknobs, bathroom fixtures, toilets, phones, keyboards, tablets, and bedside tables, every day. Also, clean any surfaces that may have blood, stool, or body fluids on them.   o Use a household cleaning spray or wipe, according to the label instructions. Labels contain instructions for safe and effective use of the cleaning product including precautions you should take when applying the  product, such as wearing gloves and making sure you have good ventilation during use of the product.  • Wash laundry thoroughly.   o Immediately remove and wash clothes or bedding that have blood, stool, or body fluids on them.  o Wear disposable gloves while handling soiled items and keep soiled items away from your body. Clean your hands (with soap and water or an alcohol-based hand ) immediately after removing your gloves.  o Read and follow directions on labels of laundry or clothing items and detergent. In general, using a normal laundry detergent according to washing machine instructions and dry thoroughly using the warmest temperatures recommended on the clothing label.  • Place all used disposable gloves, facemasks, and other contaminated items in a lined container before disposing of them with other household waste. Clean your hands (with soap and water or an alcohol-based hand ) immediately after handling these items. Soap and water should be used preferentially if hands are visibly dirty.  • Discuss any additional questions with your state or local health department or healthcare provider.    Adapted from information provided by the Centers for Disease Control and Prevention.  For more information, visit https://www.cdc.gov/coronavirus/2019-ncov/hcp/guidance-prevent-spread.html  Earache, Adult  An earache, or ear pain, can be caused by many things, including:  An infection.  Ear wax buildup.  Ear pressure.  Something in the ear that should not be there (foreign body).  A sore throat.  Tooth problems.  Jaw problems.  Treatment of the earache will depend on the cause. If the cause is not clear or cannot be determined, you may need to watch your symptoms until your earache goes away or until a cause is found.  Follow these instructions at home:  Pay attention to any changes in your symptoms. Take these actions to help with your pain:  Take or apply over-the-counter and prescription  medicines only as told by your health care provider.  If you were prescribed an antibiotic medicine, use it as told by your health care provider. Do not stop using the antibiotic even if you start to feel better.  Do not put anything in your ear other than medicine that is prescribed by your health care provider.  If directed, apply heat to the affected area as often as told by your health care provider. Use the heat source that your health care provider recommends, such as a moist heat pack or a heating pad.  Place a towel between your skin and the heat source.  Leave the heat on for 20-30 minutes.  Remove the heat if your skin turns bright red. This is especially important if you are unable to feel pain, heat, or cold. You may have a greater risk of getting burned.  If directed, put ice on the ear:  Put ice in a plastic bag.  Place a towel between your skin and the bag.  Leave the ice on for 20 minutes, 2-3 times a day.  Try resting in an upright position instead of lying down. This may help to reduce pressure in your ear and relieve pain.  Chew gum if it helps to relieve your ear pain.  Treat any allergies as told by your health care provider.  Keep all follow-up visits as told by your health care provider. This is important.  Contact a health care provider if:  Your pain does not improve within 2 days.  Your earache gets worse.  You have new symptoms.  You have a fever.  Get help right away if:  You have a severe headache.  You have a stiff neck.  You have trouble swallowing.  You have redness or swelling behind your ear.  You have fluid or blood coming from your ear.  You have hearing loss.  You feel dizzy.  This information is not intended to replace advice given to you by your health care provider. Make sure you discuss any questions you have with your health care provider.  Document Released: 08/04/2005 Document Revised: 11/30/2018 Document Reviewed: 06/12/2017  Elsevier Patient Education © 2020 [employer]     72.6

## 2020-06-23 NOTE — PROGRESS NOTES
Subjective   Connie Lu is a 65 y.o. female.   Video visit    You have chosen to receive care through a telehealth visit.  Do you consent to use a video/audio connection for your medical care today? Yes    History of Present Illness   Pt presents for video visit with CC of sore throat X 1 week   LGF 99 on Friday. No additional fever.   Bilateral earache, R>L. Some drainage  Pain with swallowing. Worse with laying down and getting up in morning    Sinus pressure that comes and goes   HAs off an on, none today   Retired. Staying close to home   Outside a lot over the weekend   Allergies have been flaring up   No sudden loss of taste and smell   Has had tonsils removed.   No SOB   Mild cough with some production     Has been in contact with mother who she lives with. No one else with similar symptoms   Goes to grocery store (wears mask)   No known contact with COVID 19      The following portions of the patient's history were reviewed and updated as appropriate: allergies, current medications, past family history, past medical history, past social history, past surgical history and problem list.    Review of Systems   Constitutional: Positive for fever. Negative for chills, diaphoresis and fatigue.   HENT: Positive for congestion, ear pain, postnasal drip, sinus pressure, sore throat and trouble swallowing. Negative for ear discharge, hearing loss, nosebleeds and sneezing.    Eyes: Negative.    Respiratory: Positive for cough. Negative for chest tightness, shortness of breath and wheezing.    Cardiovascular: Negative.  Negative for chest pain, palpitations and leg swelling.   Gastrointestinal: Negative for abdominal distention, abdominal pain, blood in stool, constipation, diarrhea, nausea and vomiting.   Genitourinary: Negative.  Negative for difficulty urinating, dysuria, flank pain, frequency, hematuria and urgency.   Skin: Negative.  Negative for color change, pallor, rash and wound.    Allergic/Immunologic: Positive for environmental allergies.   Neurological: Positive for headaches. Negative for dizziness, syncope, weakness, light-headedness and numbness.       Objective    There were no vitals taken for this visit.     Physical Exam   Constitutional: She is oriented to person, place, and time. She appears well-developed and well-nourished. No distress.   HENT:   Head: Normocephalic.   Eyes: Conjunctivae are normal.   Pulmonary/Chest: Effort normal.   Neurological: She is alert and oriented to person, place, and time.   Psychiatric: She has a normal mood and affect. Her behavior is normal. Judgment and thought content normal.     Additional physical exam unable to obtain due to video visit     Assessment/Plan   Connie was seen today for sore throat.    Diagnoses and all orders for this visit:    Acute pharyngitis, unspecified etiology  -     azithromycin (Zithromax Z-Justin) 250 MG tablet; Take 2 tablets the first day, then 1 tablet daily for 4 days.    Sinus congestion  -     azithromycin (Zithromax Z-Justin) 250 MG tablet; Take 2 tablets the first day, then 1 tablet daily for 4 days.    Earache symptoms, bilateral  -     azithromycin (Zithromax Z-Justin) 250 MG tablet; Take 2 tablets the first day, then 1 tablet daily for 4 days.      Cover empirically as outlined in plan. Encourage social distancing and self quarantine due to symptoms. Report to [facility] for point of care testing and evaluation if new or worsening symptoms develop. Pt aware and agrees.   Rest, fluids and OTC antihistamine     This visit has been scheduled as a video visit to comply with patient safety concerns in accordance with CDC recommendations. Total time of discussion was 15 minutes.

## 2020-06-23 NOTE — TELEPHONE ENCOUNTER
PT CALLED TO SCHEDULE AN APPT FOR A SORE THROAT AND SWOLLEN GLANDS    PT DECLINED VIDEO VISIT    PLEASE ADVISE AT: [phone]

## 2020-06-24 DIAGNOSIS — I10 ESSENTIAL HYPERTENSION: ICD-10-CM

## 2020-06-24 RX ORDER — LOSARTAN POTASSIUM 100 MG/1
TABLET ORAL
Qty: 30 TABLET | Refills: 1 | Status: SHIPPED | OUTPATIENT
Start: 2020-06-24 | End: 2020-10-08 | Stop reason: SDUPTHER

## 2020-06-25 ENCOUNTER — DOCUMENTATION (OUTPATIENT)
Dept: NUTRITION | Facility: HOSPITAL | Age: 65
End: 2020-06-25

## 2020-06-25 DIAGNOSIS — F43.20 ADJUSTMENT DISORDER, UNSPECIFIED TYPE: Primary | ICD-10-CM

## 2020-06-25 DIAGNOSIS — F41.9 ANXIETY: ICD-10-CM

## 2020-06-25 NOTE — PROGRESS NOTES
"Adult Outpatient Nutrition  Assessment    Patient Name:  Connie Lu  YOB: 1955  MRN: [id number]    Assessment Date:  6/25/2020    Comments: Received completed follow up letter from patient regarding her nutrition progress with weight loss. Patient indicated she has had problems following the suggestions made further indicating that she \"did not plan well.\" She also noted problems following the recommended calorie level as she had a relative staying with her and was unable to plan meals. Reports her current weight as 183 lbs, which represents no change since the initial appointment. Patient indicated that information provided was not helpful, stating that she felt the meal plan was a \"complete waste of time.\" Further states that she questions RD competence due to presence of hot dogs and bologna on food lists (under high fat meat- to be limited/avoided). Spoke with patient to followup on concerns and she stated that she intended to be scheduled with the bariatric dietitian with Dr. Jhaveri' office. Will contact Dr. Jhaveri' office to follow up.      Reported goal completion: none provided.  1) eat 3 servings of vegetables per day  2) exercise 30 minutes 4 days per week  3) weight loss- 0% met based on reported weight     Follow up letter sent to medical records. Thank you again for this referral.     Electronically signed by:  Tegan Mullen RD  06/25/20 14:32   "

## 2020-06-26 DIAGNOSIS — F41.9 ANXIETY: ICD-10-CM

## 2020-06-26 RX ORDER — VENLAFAXINE HYDROCHLORIDE 150 MG/1
150 CAPSULE, EXTENDED RELEASE ORAL DAILY
Qty: 30 CAPSULE | Refills: 5 | Status: SHIPPED | OUTPATIENT
Start: 2020-06-26 | End: 2020-07-31

## 2020-07-13 ENCOUNTER — OFFICE VISIT (OUTPATIENT)
Dept: FAMILY MEDICINE CLINIC | Facility: CLINIC | Age: 65
End: 2020-07-13

## 2020-07-13 VITALS
HEART RATE: 78 BPM | WEIGHT: 186 LBS | DIASTOLIC BLOOD PRESSURE: 76 MMHG | BODY MASS INDEX: 36.52 KG/M2 | HEIGHT: 60 IN | RESPIRATION RATE: 18 BRPM | TEMPERATURE: 97.6 F | SYSTOLIC BLOOD PRESSURE: 126 MMHG

## 2020-07-13 DIAGNOSIS — I10 ESSENTIAL HYPERTENSION: Primary | ICD-10-CM

## 2020-07-13 DIAGNOSIS — F34.1 DYSTHYMIA: ICD-10-CM

## 2020-07-13 DIAGNOSIS — F51.01 PRIMARY INSOMNIA: ICD-10-CM

## 2020-07-13 DIAGNOSIS — F41.9 ANXIETY: ICD-10-CM

## 2020-07-13 DIAGNOSIS — E61.1 IRON DEFICIENCY: ICD-10-CM

## 2020-07-13 DIAGNOSIS — R73.9 HYPERGLYCEMIA: ICD-10-CM

## 2020-07-13 DIAGNOSIS — E03.9 ACQUIRED HYPOTHYROIDISM: ICD-10-CM

## 2020-07-13 PROCEDURE — 99214 OFFICE O/P EST MOD 30 MIN: CPT | Performed by: FAMILY MEDICINE

## 2020-07-13 RX ORDER — PNV NO.95/FERROUS FUM/FOLIC AC 28MG-0.8MG
1 TABLET ORAL
Qty: 30 TABLET | Refills: 5 | Status: SHIPPED | OUTPATIENT
Start: 2020-07-13 | End: 2021-01-04

## 2020-07-13 NOTE — PROGRESS NOTES
Assessment/Plan       Problems Addressed this Visit        Cardiovascular and Mediastinum    Hypertension - Primary    Relevant Orders    Comprehensive Metabolic Panel       Digestive    Iron deficiency    Relevant Medications    ferrous sulfate 325 (65 Fe) MG tablet       Endocrine    Hypothyroidism    Relevant Orders    TSH       Other    Depression    Hyperglycemia    Relevant Orders    Hemoglobin A1c    Primary insomnia    Anxiety            Follow up: Return for follow up depends on review of labs and testing.     DISCUSSION  Essential hypertension.  Blood pressure is stable.  Continue medication.    Hypothyroidism.  Check TSH.    Depression and anxiety.  Continue current medications.  Stable.    Iron deficiency.  Recheck blood work.    Insomnia.  Continue trazodone.    Hyperglycemia.  Check A1c.          MEDICATIONS PRESCRIBED  Requested Prescriptions     Signed Prescriptions Disp Refills   • ferrous sulfate 325 (65 Fe) MG tablet 30 tablet 5     Sig: Take 1 tablet by mouth Daily With Breakfast.          -------------------------------------------    Subjective     Chief Complaint   Patient presents with   • Hypertension     f/u          Hypertension   This is a chronic problem. The current episode started more than 1 year ago. The problem is unchanged. The problem is controlled. Pertinent negatives include no chest pain, headaches, malaise/fatigue (at times but usually doing ok), peripheral edema or shortness of breath. Current antihypertension treatment includes angiotensin blockers. The current treatment provides moderate improvement. There are no compliance problems.  There is no history of angina, kidney disease or CAD/MI. There is no history of chronic renal disease.   Hypothyroidism   This is a chronic problem. The current episode started more than 1 year ago. The problem occurs daily. The problem has been unchanged. Pertinent negatives include no change in bowel habit (off and on ), chest pain,  "fatigue, headaches, nausea or vomiting. Nothing aggravates the symptoms. Treatments tried: levothyroxine. The treatment provided moderate relief.   Depression   Visit Type: follow-up (depression and anxiety. doing ok on meds)  Patient presents with the following symptoms: depressed mood and nervousness/anxiety.  Patient is not experiencing: shortness of breath and suicidal ideas.  Frequency of symptoms: occasionally   Severity: mild   Sleep quality: good          Sore throat and ear pain is better now but took a month to get better  No pain now  Took awhile after the antibiotic             Social History     Tobacco Use   Smoking Status Never Smoker   Smokeless Tobacco Never Used          Past Medical History,Medications, Allergies, and social history was reviewed.          Review of Systems   Constitutional: Negative for fatigue and malaise/fatigue (at times but usually doing ok).   Respiratory: Negative for shortness of breath.    Cardiovascular: Negative for chest pain.   Gastrointestinal: Negative for change in bowel habit (off and on ), nausea and vomiting.   Psychiatric/Behavioral: Positive for depressed mood. Negative for suicidal ideas. The patient is nervous/anxious.        Objective     Vitals:    07/13/20 1213   BP: 126/76   Pulse: 78   Resp: 18   Temp: 97.6 °F (36.4 °C)   Weight: 84.4 kg (186 lb)   Height: 151.1 cm (59.5\")          Physical Exam   Constitutional: She appears well-developed and well-nourished.   HENT:   Head: Normocephalic and atraumatic.   Right Ear: Hearing, tympanic membrane, external ear and ear canal normal.   Left Ear: Hearing, tympanic membrane, external ear and ear canal normal.   Mouth/Throat: Oropharynx is clear and moist.   Eyes: Pupils are equal, round, and reactive to light. Conjunctivae and EOM are normal.   Neck: Normal range of motion. Neck supple. No thyromegaly present.   Cardiovascular: Normal rate, regular rhythm and normal heart sounds. Exam reveals no gallop and no " friction rub.   No murmur heard.  Pulmonary/Chest: Effort normal and breath sounds normal. No respiratory distress. She has no wheezes. She has no rales.   Abdominal: Soft. Bowel sounds are normal. She exhibits no distension. There is no tenderness. There is no rebound and no guarding.   Musculoskeletal: She exhibits no edema.   Neurological: She is alert.   Skin: Skin is warm and dry.   Psychiatric: She has a normal mood and affect.   Nursing note and vitals reviewed.                Masoud Martin MD

## 2020-07-14 LAB
ALBUMIN SERPL-MCNC: 4.5 G/DL (ref 3.5–5.2)
ALBUMIN/GLOB SERPL: 2 G/DL
ALP SERPL-CCNC: 88 U/L (ref 39–117)
ALT SERPL-CCNC: 20 U/L (ref 1–33)
AST SERPL-CCNC: 21 U/L (ref 1–32)
BILIRUB SERPL-MCNC: 0.3 MG/DL (ref 0–1.2)
BUN SERPL-MCNC: 27 MG/DL (ref 8–23)
BUN/CREAT SERPL: 38 (ref 7–25)
CALCIUM SERPL-MCNC: 9.5 MG/DL (ref 8.6–10.5)
CHLORIDE SERPL-SCNC: 102 MMOL/L (ref 98–107)
CO2 SERPL-SCNC: 28.1 MMOL/L (ref 22–29)
CREAT SERPL-MCNC: 0.71 MG/DL (ref 0.57–1)
GLOBULIN SER CALC-MCNC: 2.2 GM/DL
GLUCOSE SERPL-MCNC: 80 MG/DL (ref 65–99)
HBA1C MFR BLD: 5.6 % (ref 4.8–5.6)
POTASSIUM SERPL-SCNC: 5 MMOL/L (ref 3.5–5.2)
PROT SERPL-MCNC: 6.7 G/DL (ref 6–8.5)
SODIUM SERPL-SCNC: 141 MMOL/L (ref 136–145)
TSH SERPL DL<=0.005 MIU/L-ACNC: 2.6 UIU/ML (ref 0.27–4.2)

## 2020-07-20 DIAGNOSIS — E03.9 ACQUIRED HYPOTHYROIDISM: ICD-10-CM

## 2020-07-20 DIAGNOSIS — F51.01 PRIMARY INSOMNIA: ICD-10-CM

## 2020-07-20 RX ORDER — LEVOTHYROXINE SODIUM 112 UG/1
TABLET ORAL
Qty: 30 TABLET | Refills: 5 | Status: SHIPPED | OUTPATIENT
Start: 2020-07-20 | End: 2021-01-04

## 2020-07-20 RX ORDER — TRAZODONE HYDROCHLORIDE 50 MG/1
TABLET ORAL
Qty: 60 TABLET | Refills: 5 | Status: SHIPPED | OUTPATIENT
Start: 2020-07-20 | End: 2021-05-27

## 2020-07-22 DIAGNOSIS — E61.1 IRON DEFICIENCY: ICD-10-CM

## 2020-07-22 RX ORDER — PNV NO.95/FERROUS FUM/FOLIC AC 28MG-0.8MG
TABLET ORAL
Qty: 30 TABLET | Refills: 0 | OUTPATIENT
Start: 2020-07-22

## 2020-07-31 DIAGNOSIS — F41.9 ANXIETY: ICD-10-CM

## 2020-07-31 RX ORDER — VENLAFAXINE HYDROCHLORIDE 150 MG/1
CAPSULE, EXTENDED RELEASE ORAL
Qty: 30 CAPSULE | Refills: 2 | Status: SHIPPED | OUTPATIENT
Start: 2020-07-31 | End: 2021-03-30

## 2020-10-08 ENCOUNTER — OFFICE VISIT (OUTPATIENT)
Dept: FAMILY MEDICINE CLINIC | Facility: CLINIC | Age: 65
End: 2020-10-08

## 2020-10-08 VITALS
HEIGHT: 60 IN | TEMPERATURE: 98 F | DIASTOLIC BLOOD PRESSURE: 80 MMHG | RESPIRATION RATE: 18 BRPM | BODY MASS INDEX: 35.53 KG/M2 | HEART RATE: 76 BPM | WEIGHT: 181 LBS | SYSTOLIC BLOOD PRESSURE: 116 MMHG

## 2020-10-08 DIAGNOSIS — I10 ESSENTIAL HYPERTENSION: ICD-10-CM

## 2020-10-08 DIAGNOSIS — Z23 NEED FOR INFLUENZA VACCINATION: ICD-10-CM

## 2020-10-08 DIAGNOSIS — R68.84 JAW PAIN: Primary | ICD-10-CM

## 2020-10-08 PROCEDURE — 90471 IMMUNIZATION ADMIN: CPT | Performed by: FAMILY MEDICINE

## 2020-10-08 PROCEDURE — 90686 IIV4 VACC NO PRSV 0.5 ML IM: CPT | Performed by: FAMILY MEDICINE

## 2020-10-08 PROCEDURE — 99213 OFFICE O/P EST LOW 20 MIN: CPT | Performed by: FAMILY MEDICINE

## 2020-10-08 RX ORDER — LOSARTAN POTASSIUM 100 MG/1
100 TABLET ORAL DAILY
Qty: 30 TABLET | Refills: 5 | Status: SHIPPED | OUTPATIENT
Start: 2020-10-08 | End: 2021-03-29

## 2020-10-08 RX ORDER — OMEPRAZOLE 20 MG/1
20 CAPSULE, DELAYED RELEASE ORAL DAILY
Qty: 30 CAPSULE | Refills: 5 | Status: SHIPPED | OUTPATIENT
Start: 2020-10-08 | End: 2021-03-29

## 2020-10-08 NOTE — PROGRESS NOTES
Assessment/Plan       Problems Addressed this Visit        Cardiovascular and Mediastinum    Hypertension    Relevant Medications    losartan (COZAAR) 100 MG tablet      Other Visit Diagnoses     Jaw pain    -  Primary    Need for influenza vaccination        Relevant Orders    Fluarix/Fluzone/Afluria Quad>6 Months (Completed)      Diagnoses       Codes Comments    Jaw pain    -  Primary ICD-10-CM: R68.84  ICD-9-CM: 784.92     Essential hypertension     ICD-10-CM: I10  ICD-9-CM: 401.9     Need for influenza vaccination     ICD-10-CM: Z23  ICD-9-CM: V04.81             Follow up: Return if symptoms worsen or fail to improve.     DISCUSSION  Suspect jaw / dental issue.   Rec see her dentist and if no cause found, may need to see oral surgeon    Blood pressure stable. Refilled meds      Flu shot today    > 10 yrs since last Td, she will check with insurance on coverage      MEDICATIONS PRESCRIBED  Requested Prescriptions     Signed Prescriptions Disp Refills   • losartan (COZAAR) 100 MG tablet 30 tablet 5     Sig: Take 1 tablet by mouth Daily.   • omeprazole (priLOSEC) 20 MG capsule 30 capsule 5     Sig: Take 1 capsule by mouth Daily.              -------------------------------------------    Subjective     Chief Complaint   Patient presents with   • Earache     Right ear and jaw pain         Earache   There is pain in the right ear. This is a recurrent problem. Episode onset: x 1 yr. Episode frequency: at times. The problem has been waxing and waning. There has been no fever. Associated symptoms include coughing (sl due to weather), hearing loss (? has some) and neck pain (right side). Pertinent negatives include no rhinorrhea. She has tried nothing for the symptoms. The treatment provided no relief. has had intermittent infection       Grinds teeth and wears mouth guard but lost it, 2018 ( had one made for her)  Bite down hard during the day    Pain in the right ear and jaw  Hard to open mouth wide  Takes gummy  "vitamins and hard to chew on that side    Right side of neck hurts as well    Sees Dr Person for dentist  Has not checked this out    HTN  On losartan  Doing well  Refilled meds      Social History     Tobacco Use   Smoking Status Never Smoker   Smokeless Tobacco Never Used          Past Medical History,Medications, Allergies, and social history was reviewed.          Review of Systems   Constitutional: Negative.    HENT: Positive for ear pain and hearing loss (? has some). Negative for rhinorrhea.    Respiratory: Positive for cough (sl due to weather).    Cardiovascular: Negative.    Gastrointestinal: Negative.    Musculoskeletal: Positive for neck pain (right side).   Neurological: Negative.    Psychiatric/Behavioral: Negative.        Objective     Vitals:    10/08/20 1111   BP: 116/80   Pulse: 76   Resp: 18   Temp: 98 °F (36.7 °C)   Weight: 82.1 kg (181 lb)   Height: 151.1 cm (59.5\")          Physical Exam  Vitals signs and nursing note reviewed.   Constitutional:       General: She is not in acute distress.     Appearance: Normal appearance. She is well-developed. She is not ill-appearing.   HENT:      Head: Normocephalic and atraumatic.      Right Ear: Hearing, ear canal and external ear normal. No tenderness. Tympanic membrane is retracted. Tympanic membrane is not erythematous.      Left Ear: Hearing, ear canal and external ear normal. No tenderness. Tympanic membrane is retracted. Tympanic membrane is not erythematous.      Mouth/Throat:      Mouth: Mucous membranes are moist.      Pharynx: No oropharyngeal exudate or posterior oropharyngeal erythema.   Eyes:      Conjunctiva/sclera: Conjunctivae normal.      Pupils: Pupils are equal, round, and reactive to light.   Neck:      Musculoskeletal: Normal range of motion and neck supple.      Thyroid: No thyromegaly.   Cardiovascular:      Rate and Rhythm: Normal rate and regular rhythm.      Heart sounds: Normal heart sounds. No murmur. No friction rub. No " gallop.    Pulmonary:      Effort: Pulmonary effort is normal. No respiratory distress.      Breath sounds: Normal breath sounds. No wheezing or rales.   Musculoskeletal:      Right lower leg: No edema.      Left lower leg: No edema.   Skin:     General: Skin is warm and dry.   Neurological:      General: No focal deficit present.      Mental Status: She is alert.       + tenderness right TMJ  Normal alignment of jaw              Masoud Martin MD

## 2020-10-14 DIAGNOSIS — I10 ESSENTIAL HYPERTENSION: ICD-10-CM

## 2020-10-14 RX ORDER — LOSARTAN POTASSIUM 100 MG/1
TABLET ORAL
Qty: 30 TABLET | Refills: 0 | OUTPATIENT
Start: 2020-10-14

## 2020-11-19 ENCOUNTER — OFFICE VISIT (OUTPATIENT)
Dept: FAMILY MEDICINE CLINIC | Facility: CLINIC | Age: 65
End: 2020-11-19

## 2020-11-19 VITALS
HEIGHT: 60 IN | RESPIRATION RATE: 18 BRPM | WEIGHT: 182 LBS | SYSTOLIC BLOOD PRESSURE: 138 MMHG | HEART RATE: 78 BPM | BODY MASS INDEX: 35.73 KG/M2 | TEMPERATURE: 98.2 F | DIASTOLIC BLOOD PRESSURE: 84 MMHG

## 2020-11-19 DIAGNOSIS — J06.9 ACUTE URI: Primary | ICD-10-CM

## 2020-11-19 DIAGNOSIS — R51.9 ACUTE NONINTRACTABLE HEADACHE, UNSPECIFIED HEADACHE TYPE: ICD-10-CM

## 2020-11-19 DIAGNOSIS — R05.9 COUGH: ICD-10-CM

## 2020-11-19 PROCEDURE — 99213 OFFICE O/P EST LOW 20 MIN: CPT | Performed by: FAMILY MEDICINE

## 2020-11-19 RX ORDER — AZITHROMYCIN 250 MG/1
TABLET, FILM COATED ORAL
Qty: 6 TABLET | Refills: 0 | Status: SHIPPED | OUTPATIENT
Start: 2020-11-19 | End: 2021-02-02

## 2020-11-19 NOTE — PROGRESS NOTES
Assessment/Plan       Diagnoses and all orders for this visit:    1. Acute URI (Primary)  -     azithromycin (Zithromax) 250 MG tablet; Take 2 tablets the first day, then 1 tablet daily for 4 days.  Dispense: 6 tablet; Refill: 0    2. Cough  -     COVID-19,[facility] ROUTINE, NP/OP SWAB IN TRANSPORT MEDIA OR ESWAB 72 HR TAT - Swab, Nasopharynx    3. Acute nonintractable headache, unspecified headache type  -     COVID-19,[facility] ROUTINE, NP/OP SWAB IN TRANSPORT MEDIA OR ESWAB 72 HR TAT - Swab, Nasopharynx           Follow up: Return if symptoms worsen or fail to improve.     DISCUSSION  Acute URI with cough and headache.  May be early sinus infection.  Start Z-Justin but with coronavirus pandemic, check Covid testing today as well.  Recommend quarantine/isolation until released.  Increase fluids and rest.  Call or follow-up if not improving.    3:38-3:46, spent 8 minutes with the patient      MEDICATIONS PRESCRIBED  Requested Prescriptions     Signed Prescriptions Disp Refills   • azithromycin (Zithromax) 250 MG tablet 6 tablet 0     Sig: Take 2 tablets the first day, then 1 tablet daily for 4 days.            -------------------------------------------    Subjective     Chief Complaint   Patient presents with   • Sinus Problem   • Cough     in morning   • Headache         Sinus Problem  This is a new problem. Episode onset: x 2 days. The problem has been gradually worsening since onset. There has been no fever. Associated symptoms include congestion (yellow and green in the am), coughing ( in the am), headaches ( back of neck to top of head) and sinus pressure (comes and goes. ). Pertinent negatives include no shortness of breath or sore throat. (Eyes hurt at times)       No ill contacts  No chest pain    No shortness of breath  Occ diarrhea and normal for her  No loss of taste or smell  No covid exposure    1 cc coffee per day  Causes cramping in the colon             Social History     Tobacco Use   Smoking Status  "Never Smoker   Smokeless Tobacco Never Used          Past Medical History,Medications, Allergies, and social history was reviewed.          Review of Systems   Constitutional: Negative for fever.   HENT: Positive for congestion (yellow and green in the am) and sinus pressure (comes and goes. ). Negative for sore throat.    Respiratory: Positive for cough ( in the am). Negative for shortness of breath.    Cardiovascular: Negative.    Gastrointestinal: Positive for diarrhea (occ).   Psychiatric/Behavioral: Negative.        Objective     Vitals:    11/19/20 1530   BP: 138/84   Pulse: 78   Resp: 18   Temp: 98.2 °F (36.8 °C)   Weight: 82.6 kg (182 lb)   Height: 151.1 cm (59.5\")          Physical Exam  Vitals signs and nursing note reviewed.   Constitutional:       General: She is not in acute distress.     Appearance: Normal appearance. She is well-developed. She is not ill-appearing.   HENT:      Head: Normocephalic and atraumatic.      Right Ear: Hearing, ear canal and external ear normal. Tympanic membrane is retracted. Tympanic membrane is not erythematous.      Left Ear: Hearing, ear canal and external ear normal. Tympanic membrane is retracted. Tympanic membrane is not erythematous.      Nose:      Right Sinus: No maxillary sinus tenderness.      Left Sinus: No maxillary sinus tenderness.      Mouth/Throat:      Mouth: Mucous membranes are moist.      Pharynx: No oropharyngeal exudate or posterior oropharyngeal erythema.   Eyes:      Conjunctiva/sclera: Conjunctivae normal.      Pupils: Pupils are equal, round, and reactive to light.   Neck:      Musculoskeletal: Normal range of motion and neck supple.      Thyroid: No thyromegaly.   Cardiovascular:      Rate and Rhythm: Normal rate and regular rhythm.      Heart sounds: Normal heart sounds. No murmur. No friction rub. No gallop.    Pulmonary:      Effort: Pulmonary effort is normal. No respiratory distress.      Breath sounds: Normal breath sounds. No wheezing or " rales.   Musculoskeletal:      Right lower leg: No edema.      Left lower leg: No edema.   Skin:     General: Skin is warm and dry.   Neurological:      General: No focal deficit present.      Mental Status: She is alert.                     Masoud Martin MD

## 2020-11-20 LAB — SARS-COV-2 RNA RESP QL NAA+PROBE: NOT DETECTED

## 2020-12-24 ENCOUNTER — TELEMEDICINE (OUTPATIENT)
Dept: FAMILY MEDICINE CLINIC | Facility: CLINIC | Age: 65
End: 2020-12-24

## 2020-12-24 DIAGNOSIS — M25.511 CHRONIC RIGHT SHOULDER PAIN: Primary | ICD-10-CM

## 2020-12-24 DIAGNOSIS — M79.645 CHRONIC PAIN OF LEFT THUMB: ICD-10-CM

## 2020-12-24 DIAGNOSIS — G89.29 CHRONIC RIGHT SHOULDER PAIN: Primary | ICD-10-CM

## 2020-12-24 DIAGNOSIS — G89.29 CHRONIC PAIN OF LEFT THUMB: ICD-10-CM

## 2020-12-24 PROCEDURE — 99442 PR PHYS/QHP TELEPHONE EVALUATION 11-20 MIN: CPT | Performed by: PHYSICIAN ASSISTANT

## 2020-12-24 RX ORDER — PREDNISONE 10 MG/1
TABLET ORAL
Qty: 21 EACH | Refills: 0 | Status: SHIPPED | OUTPATIENT
Start: 2020-12-24 | End: 2021-02-02

## 2020-12-24 RX ORDER — CYCLOBENZAPRINE HCL 10 MG
5-10 TABLET ORAL 3 TIMES DAILY PRN
Qty: 30 TABLET | Refills: 1 | Status: SHIPPED | OUTPATIENT
Start: 2020-12-24 | End: 2021-01-11

## 2020-12-24 NOTE — PROGRESS NOTES
Subjective   Connie Lu is a 65 y.o. female.     You have chosen to receive care through a telephone visit. Do you consent to use a telephone visit for your medical care today? Yes    History of Present Illness   Pt presents for telephone visit with CC of R should pain and L thumb pain for the last several months     R shoulder- constant dull ache   If using arm a lot will cause worsening pain   Sleeps on R side   Pain waking her up from sleep   Heat helps some   Pain with raising arm. Decreased ROM   Having problems with neck as well  Neck has been popping   No hx of shoulder injury or previous surgery     Takes tylenol occassionally  Ibuprofen occassionally (sleeve surgery)     L hand pain, worst at L thumb joint    No known injury   Some lifting she does regularly   Decreased  strength   Heat helps some   Some swelling by thenar prominence of L hand       (R hand dominant) hx of carpal tunnel and trigger finger release in R hand     The following portions of the patient's history were reviewed and updated as appropriate: allergies, current medications, past family history, past medical history, past social history, past surgical history and problem list.    Review of Systems   Constitutional: Negative for chills, diaphoresis and fever.   HENT: Negative.  Negative for congestion, ear discharge, ear pain, hearing loss, nosebleeds, postnasal drip, sinus pressure, sneezing and sore throat.    Eyes: Negative.    Respiratory: Negative.  Negative for cough, chest tightness, shortness of breath and wheezing.    Cardiovascular: Negative.  Negative for chest pain, palpitations and leg swelling.   Gastrointestinal: Negative for abdominal distention, abdominal pain, blood in stool, constipation, diarrhea, nausea and vomiting.   Musculoskeletal: Positive for arthralgias, joint swelling, neck pain and neck stiffness. Negative for back pain, gait problem and myalgias.   Skin: Negative.  Negative for color  change, pallor, rash and wound.   Neurological: Positive for weakness. Negative for dizziness, syncope, light-headedness, numbness and headaches.       Objective   Physical Exam  Unable to obtain physical exam due to telephone visit     Assessment/Plan   Diagnoses and all orders for this visit:    1. Chronic right shoulder pain (Primary)  -     XR Shoulder 2+ View Right  -     predniSONE (DELTASONE) 10 MG (21) dose pack; Use as directed on package  Dispense: 21 each; Refill: 0  -     cyclobenzaprine (FLEXERIL) 10 MG tablet; Take 0.5-1 tablets by mouth 3 (Three) Times a Day As Needed for Muscle Spasms.  Dispense: 30 tablet; Refill: 1    2. Chronic pain of left thumb  -     XR Hand 3+ View Left  -     predniSONE (DELTASONE) 10 MG (21) dose pack; Use as directed on package  Dispense: 21 each; Refill: 0  -     cyclobenzaprine (FLEXERIL) 10 MG tablet; Take 0.5-1 tablets by mouth 3 (Three) Times a Day As Needed for Muscle Spasms.  Dispense: 30 tablet; Refill: 1    pt will obtain imaging next week after the holidays   Will manage discomfort with steroid taper, muscle relaxer, and alternating ice and heat   Encourage thumb stabilizer brace for left hand. Pt agrees  F/u pending imaging     This visit has been rescheduled as a phone visit to comply with patient safety concerns in accordance with CDC recommendations. Total time of discussion was 15 minutes.

## 2020-12-28 ENCOUNTER — HOSPITAL ENCOUNTER (OUTPATIENT)
Dept: GENERAL RADIOLOGY | Facility: HOSPITAL | Age: 65
Discharge: HOME | End: 2020-12-28
Admitting: PHYSICIAN ASSISTANT

## 2020-12-28 PROCEDURE — 73030 X-RAY EXAM OF SHOULDER: CPT

## 2020-12-28 PROCEDURE — 73130 X-RAY EXAM OF HAND: CPT

## 2020-12-30 DIAGNOSIS — R93.6 ABNORMAL X-RAY OF SHOULDER: ICD-10-CM

## 2020-12-30 DIAGNOSIS — M25.611 DECREASED ROM OF RIGHT SHOULDER: ICD-10-CM

## 2020-12-30 DIAGNOSIS — G89.29 CHRONIC RIGHT SHOULDER PAIN: Primary | ICD-10-CM

## 2020-12-30 DIAGNOSIS — M25.511 CHRONIC RIGHT SHOULDER PAIN: Primary | ICD-10-CM

## 2020-12-31 NOTE — PROGRESS NOTES
I have reviewed the notes, assessments, and/or procedures performed by BRUCE Lang, I concur with her/his documentation of Connie Lu.

## 2021-01-04 ENCOUNTER — TELEPHONE (OUTPATIENT)
Dept: FAMILY MEDICINE CLINIC | Facility: CLINIC | Age: 66
End: 2021-01-04

## 2021-01-04 DIAGNOSIS — E03.9 ACQUIRED HYPOTHYROIDISM: ICD-10-CM

## 2021-01-04 DIAGNOSIS — E61.1 IRON DEFICIENCY: ICD-10-CM

## 2021-01-04 RX ORDER — FERROUS SULFATE 325(65) MG
TABLET ORAL
Qty: 30 TABLET | Refills: 1 | Status: SHIPPED | OUTPATIENT
Start: 2021-01-04 | End: 2021-03-29

## 2021-01-04 RX ORDER — LEVOTHYROXINE SODIUM 112 UG/1
TABLET ORAL
Qty: 30 TABLET | Refills: 1 | Status: SHIPPED | OUTPATIENT
Start: 2021-01-04 | End: 2021-03-29

## 2021-01-05 ENCOUNTER — TELEPHONE (OUTPATIENT)
Dept: FAMILY MEDICINE CLINIC | Facility: CLINIC | Age: 66
End: 2021-01-05

## 2021-01-05 NOTE — TELEPHONE ENCOUNTER
PAT[facility] IS RE[facility]ING [facility]IOR AUTHORIZ[facility] FOR A MRI OF HER SHOULDER.     CALL BACK NUMBER: [phone]

## 2021-01-09 DIAGNOSIS — G89.29 CHRONIC PAIN OF LEFT THUMB: ICD-10-CM

## 2021-01-09 DIAGNOSIS — M79.645 CHRONIC PAIN OF LEFT THUMB: ICD-10-CM

## 2021-01-09 DIAGNOSIS — G89.29 CHRONIC RIGHT SHOULDER PAIN: ICD-10-CM

## 2021-01-09 DIAGNOSIS — M25.511 CHRONIC RIGHT SHOULDER PAIN: ICD-10-CM

## 2021-01-11 RX ORDER — CYCLOBENZAPRINE HCL 10 MG
TABLET ORAL
Qty: 30 TABLET | Refills: 0 | Status: SHIPPED | OUTPATIENT
Start: 2021-01-11 | End: 2021-09-21

## 2021-01-12 DIAGNOSIS — G89.29 CHRONIC PAIN OF LEFT THUMB: ICD-10-CM

## 2021-01-12 DIAGNOSIS — M79.645 CHRONIC PAIN OF LEFT THUMB: ICD-10-CM

## 2021-01-12 DIAGNOSIS — G89.29 CHRONIC RIGHT SHOULDER PAIN: ICD-10-CM

## 2021-01-12 DIAGNOSIS — M25.511 CHRONIC RIGHT SHOULDER PAIN: ICD-10-CM

## 2021-01-12 RX ORDER — CYCLOBENZAPRINE HCL 10 MG
TABLET ORAL
Qty: 30 TABLET | Refills: 0 | OUTPATIENT
Start: 2021-01-12

## 2021-01-14 ENCOUNTER — HOSPITAL ENCOUNTER (OUTPATIENT)
Dept: MRI IMAGING | Facility: HOSPITAL | Age: 66
Discharge: HOME | End: 2021-01-14
Admitting: PHYSICIAN ASSISTANT

## 2021-01-14 DIAGNOSIS — M25.511 CHRONIC RIGHT SHOULDER PAIN: ICD-10-CM

## 2021-01-14 DIAGNOSIS — G89.29 CHRONIC RIGHT SHOULDER PAIN: ICD-10-CM

## 2021-01-14 DIAGNOSIS — R93.6 ABNORMAL X-RAY OF SHOULDER: ICD-10-CM

## 2021-01-14 DIAGNOSIS — M25.611 DECREASED ROM OF RIGHT SHOULDER: ICD-10-CM

## 2021-01-14 PROCEDURE — 73221 MRI JOINT UPR EXTREM W/O DYE: CPT

## 2021-01-18 ENCOUNTER — OFFICE VISIT (OUTPATIENT)
Dept: ORTHOPEDIC SURGERY | Facility: CLINIC | Age: 66
End: 2021-01-18

## 2021-01-18 VITALS — BODY MASS INDEX: 35.34 KG/M2 | OXYGEN SATURATION: 95 % | WEIGHT: 180 LBS | HEART RATE: 86 BPM | HEIGHT: 60 IN

## 2021-01-18 DIAGNOSIS — M75.101 NONTRAUMATIC TEAR OF RIGHT ROTATOR CUFF, UNSPECIFIED TEAR EXTENT: Primary | ICD-10-CM

## 2021-01-18 DIAGNOSIS — S46.811A FULL THICKNESS TEAR OF RIGHT SUBSCAPULARIS TENDON, INITIAL ENCOUNTER: ICD-10-CM

## 2021-01-18 DIAGNOSIS — S46.011A RUPTURE OF RIGHT SUPRASPINATUS TENDON, INITIAL ENCOUNTER: Primary | ICD-10-CM

## 2021-01-18 PROCEDURE — 99204 OFFICE O/P NEW MOD 45 MIN: CPT | Performed by: ORTHOPAEDIC SURGERY

## 2021-01-18 NOTE — PROGRESS NOTES
[facility] Note    Subjective     Chief Complaint   Patient presents with   • Right Shoulder - Pain        HPI    Connie Lu is a 65 y.o. female who presents with new problem of: right shoulder pain.  Onset: atraumatic and gradual in nature. The issue has been ongoing for 2 month(s). Pain is a 8/10 on the pain scale. Pain is described as dull, aching, burning, throbbing, stabbing and shooting. Associated symptoms include pain and stiffness. The pain is worse with any movement of the joint; resting, heat and pain medication and/or NSAID improve the pain. Previous treatments have included: NSAIDS.    I have reviewed the following portions of the patient's history:History of Present Illness and review of systems.            Past Medical History:   Diagnosis Date   • ADHD (attention deficit hyperactivity disorder) 30+ yrs ago    no action taken   • Allergic annual    sinus infections   • Anemia 8/01/2018    approximately   • Anxiety    • Arthritis 2017    Knee replaced Dr Fontenot   • Asthma     worse with heat, has inhaler for rare need   • Attention deficit disorder    • Boil     states it was not MRSA, once, many years ago   • Breast cancer (CMS/HCC) 2010    estrogen driven. S/p left mastectomy and right reduction. No requirements for radiation or chemo.  5 yrs tamoxifen   • Carpal tunnel syndrome    • Chronic cholecystitis with calculus     US stones Has epi pain, nausea   • Constipation     on linzess   • Depression    • Diverticulosis     noted on colonoscopy   • Gallstone    • Heart murmur     Has seen cardiologist 2017, had preop workup for left TKR   • Hiatal hernia with gastroesophageal reflux disease and esophagitis     controlled with esomeprazole.  EGD [facility] 6/18 HH, gr II esophagitis, superf antral ulcerations, 35 cm zline, path anturm neg h. pyl, stool ag 5/18 neg h. pyl.  path DE reflux   • History of blood transfusion     as an infant, unknown season.    • Hypertension   "  • Hypothyroidism    • Insomnia    • Iron deficiency     on iron supplements   • Joint pain     effexor helps. No NSAIDS, no injections.    • Kidney function abnormal     \"little low\" on previous labs, advised increased hydration   • Lower back pain    • Obesity 1978   • RADHA (obstructive sleep apnea)     Non CPAP compliant   • Polyp of sigmoid colon    • Prediabetes     Hb A1C 6.10 5/18   • Visual impairment 50 years    wear contacts/glasses      Past Surgical History:   Procedure Laterality Date   • ADENOIDECTOMY  1960 or 1962   • BARIATRIC SURGERY  11/2018   • BREAST SURGERY Right 2010    Reduction   • CATARACT EXTRACTION     • CHOLECYSTECTOMY N/A 11/21/2018    Procedure: CHOLECYSTECTOMY LAPAROSCOPIC;  Surgeon: Maksim Jhaveri MD;  Location:  [facility];  Service: Bariatric   • COLONOSCOPY  2016    diverticulosis, h/p benign polyps   • COSMETIC SURGERY     • ENDOSCOPY     • ENDOSCOPY N/A 11/21/2018    Procedure: ESOPHAGOGASTRODUODENOSCOPY;  Surgeon: Maksim Jhaveri MD;  Location:  [facility];  Service: Bariatric   • EYE SURGERY  2017    left retina detach   • FRACTURE SURGERY  1965    broken left arm/wrist   • GASTRIC SLEEVE LAPAROSCOPIC N/A 11/21/2018    Procedure: GASTRIC SLEEVE LAPAROSCOPIC;  Surgeon: Maksim Jhaveri MD;  Location:  [facility];  Service: Bariatric   • HIATAL HERNIA REPAIR N/A 11/21/2018    Procedure: HIATAL HERNIA REPAIR LAPAROSCOPIC;  Surgeon: Maksim Jhaveri MD;  Location:  [facility];  Service: Bariatric   • JOINT REPLACEMENT  2017    left knee   • LYMPH NODE BIOPSY  2010   • MASTECTOMY WITH IMMEDIATE RECONSTRUCTION Left 2010   • REPLACEMENT TOTAL KNEE Left 08/2017    Dr Nova   • RETINAL DETACHMENT REPAIR Left 07/2017   • SKIN BIOPSY     • TONSILLECTOMY AND ADENOIDECTOMY  1960, 1962      Family History   Problem Relation Age of Onset   • Diabetes Father         developed late in life   • Leukemia Father    • Skin cancer Father    • Heart attack Father    • Heart disease Father  "   • Sleep apnea Father    • Cancer Father         skin   • Hearing loss Father    • Obesity Mother    • Hypertension Mother    • Arthritis Mother         arthritis in legs   • Hearing loss Mother    • Thyroid disease Mother    • Skin cancer Brother    • Kidney failure Maternal Grandmother    • Hypertension Maternal Grandmother    • Heart disease Maternal Grandmother    • Kidney disease Maternal Grandmother    • Hypertension Maternal Grandfather    • Stomach cancer Maternal Grandfather    • Hypertension Paternal Grandmother    • Obesity Paternal Grandmother    • Heart disease Paternal Grandmother    • Hearing loss Paternal Grandmother    • Hypertension Paternal Grandfather    • Heart disease Paternal Grandfather    • Cancer Brother         skin     Social History     Socioeconomic History   • Marital status: Single     Spouse name: Not on file   • Number of children: Not on file   • Years of education: Not on file   • Highest education level: Not on file   Occupational History   • Occupation: unemployed   Tobacco Use   • Smoking status: Never Smoker   • Smokeless tobacco: Never Used   Substance and Sexual Activity   • Alcohol use: No   • Drug use: No   • Sexual activity: Never     Comment: no hormones   Social History Narrative    Lives in [place] with mother.  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       Current Outpatient Medications on File Prior to Visit   Medication Sig Dispense Refill   • Ascorbic Acid (VITAMIN C ER PO) Take  by mouth.     • azithromycin (Zithromax) 250 MG tablet Take 2 tablets the first day, then 1 tablet daily for 4 days. 6 tablet 0   • Cholecalciferol (VITAMIN D) 2000 units tablet      • Cyanocobalamin (VITAMIN B-12) 500 MCG lozenge      • cyclobenzaprine (FLEXERIL) 10 MG tablet TAKE 1/2 TO 1 TABLET BY MOUTH 3 TIMES A DAY AS NEEDED FOR MUSCLE SPASMS 30 tablet 0   • FeroSul 325 (65 Fe) MG tablet TAKE ONE TABLET BY MOUTH EVERY MORNING WITH BREAKFAST 30 tablet 1   • FIBER  "ADULT GUMMIES PO      • levothyroxine (SYNTHROID, LEVOTHROID) 112 MCG tablet TAKE ONE TABLET BY MOUTH EVERY DAY 30 tablet 1   • losartan (COZAAR) 100 MG tablet Take 1 tablet by mouth Daily. 30 tablet 5   • Multiple Vitamin (CALCIUM COMPLEX PO) Take  by mouth.     • omeprazole (priLOSEC) 20 MG capsule Take 1 capsule by mouth Daily. 30 capsule 5   • predniSONE (DELTASONE) 10 MG (21) dose pack Use as directed on package 21 each 0   • traZODone (DESYREL) 50 MG tablet TAKE 1 TO 2 TABLETS BY MOUTH EVERY EVENING 60 tablet 5   • venlafaxine XR (EFFEXOR-XR) 150 MG 24 hr capsule TAKE ONE CAPSULE BY MOUTH EVERY DAY 30 capsule 2     No current facility-administered medications on file prior to visit.       Allergies   Allergen Reactions   • Cephalexin Hives and Itching     Tolerates PCN fine, tolerates other cephalosporins well.    • Sudafed [Pseudoephedrine Hcl] Itching     Scalp itching        The following portions of the patient's history were reviewed and updated as appropriate: allergies, current medications, past family history, past medical history, past social history, past surgical history and problem list.    Review of Systems   Constitutional: Positive for activity change and appetite change.   HENT: Positive for postnasal drip.    Eyes: Positive for pain and itching.   Respiratory: Negative.    Cardiovascular: Negative.    Gastrointestinal: Positive for constipation and diarrhea.   Endocrine: Negative.    Genitourinary: Negative.    Musculoskeletal: Positive for arthralgias, back pain and neck pain.   Skin: Negative.    Allergic/Immunologic: Negative.    Neurological: Positive for syncope, numbness and headaches.   Hematological: Negative.    Psychiatric/Behavioral: Negative.         Objective      Physical Exam  Pulse 86   Ht 151.1 cm (59.5\")   Wt 81.6 kg (180 lb)   SpO2 95%   BMI 35.75 kg/m²     Body mass index is 35.75 kg/m².    GENERAL APPEARANCE: awake, alert & oriented x 3, in no acute distress and well " developed, well nourished  PSYCH: normal mood and affect  LUNGS:  breathing nonlabored, no wheezing  EYES: sclera anicteric, pupils equal  CARDIOVASCULAR: palpable pulses. Capillary refill less than 2 seconds  INTEGUMENTARY: skin intact, no clubbing, cyanosis  NEUROLOGIC:  Normal Sensation and reflexes       Ortho Exam  Right shoulder has 160 degrees for flexion abduction.  Rotator cuff strength 4-5.  Positive impingement.    Imaging/Studies  Imaging Results (Last 7 Days)     ** No results found for the last 168 hours. **        I viewed her x-rays from December 28 which are negative for fracture she does have humeral head elevation.  I viewed her MRI from January 14 which shows a massive retracted tear of the rotator cuff supraspinatus infraspinatus and subscapularis.  Assessment/Plan        ICD-10-CM ICD-9-CM   1. Rupture of right supraspinatus tendon, initial encounter  S46.011A 840.6   2. Full thickness tear of right subscapularis tendon, initial encounter  S46.811A 840.5     The plan is for right shoulder arthroscopy with rotator cuff repair.  She has a least a 25% failure rate based upon the chronicity and size of the tear.  Plan B would be at reverse shoulder arthroplasty but it is reasonable to try rotator cuff repair first.Treatment options and alternatives were discussed with patient.  Surgical risks include but are not limited to pain, bleeding, infection, failure to relieve symptoms, need for further procedures, recurrence of symptoms, damage to healthy adjacent structures, hardware loosening/failure, stiffness, weakness, scar, blood clots/DVT/PE, loss of limb or life. We also discussed the postoperative protocol and expected outcome although no guarantees are possible with surgery. All questions were answered; the patient would like to proceed with surgical intervention.  I offered nonoperative treatment such as physical therapy injections but she would like to try to get it fixed  Medical Decision  Making  Management Options : over-the-counter medicine and major surgery with risk factors  Data/Risk: radiology tests and independent visualization of imaging, lab tests, or EMG/NCV    Hernando Fernández MD  01/18/21  14:35 EST         EMR Dragon/Transcription disclaimer:  Much of this encounter note is an electronic transcription of spoken language to printed text. Electronic transcription of spoken language may permit erroneous, or at times, nonsensical words or phrases to be inadvertently transcribed. Although I have reviewed the note for such errors, some may still exist.

## 2021-01-27 ENCOUNTER — TELEPHONE (OUTPATIENT)
Dept: ORTHOPEDIC SURGERY | Facility: CLINIC | Age: 66
End: 2021-01-27

## 2021-01-27 NOTE — TELEPHONE ENCOUNTER
PATIENT CALLED STATED SHE HAS SURGERY ON Friday AND WOULD LIKE TO KNOW IF SHE NEEDS TO STOP TAKING HER VITAMINS. PATIENT TAKES VITIAMIN B 12 AND A MULTIVITAMIN. PATIENT WOULD LIKE A CALL BACK [phone].

## 2021-02-01 ENCOUNTER — APPOINTMENT (OUTPATIENT)
Dept: PREADMISSION TESTING | Facility: HOSPITAL | Age: 66
End: 2021-02-01

## 2021-02-01 PROCEDURE — C9803 HOPD COVID-19 SPEC COLLECT: HCPCS

## 2021-02-01 PROCEDURE — U0004 COV-19 TEST NON-CDC HGH THRU: HCPCS

## 2021-02-02 ENCOUNTER — OFFICE VISIT (OUTPATIENT)
Dept: FAMILY MEDICINE CLINIC | Facility: CLINIC | Age: 66
End: 2021-02-02

## 2021-02-02 VITALS
HEIGHT: 60 IN | HEART RATE: 76 BPM | DIASTOLIC BLOOD PRESSURE: 62 MMHG | WEIGHT: 188 LBS | BODY MASS INDEX: 36.91 KG/M2 | RESPIRATION RATE: 18 BRPM | SYSTOLIC BLOOD PRESSURE: 100 MMHG | TEMPERATURE: 98.2 F

## 2021-02-02 DIAGNOSIS — E61.1 IRON DEFICIENCY: ICD-10-CM

## 2021-02-02 DIAGNOSIS — Z23 NEED FOR SHINGLES VACCINE: ICD-10-CM

## 2021-02-02 DIAGNOSIS — Z80.9 FAMILY HISTORY OF CANCER: ICD-10-CM

## 2021-02-02 DIAGNOSIS — E03.9 ACQUIRED HYPOTHYROIDISM: Primary | ICD-10-CM

## 2021-02-02 LAB — SARS-COV-2 RNA RESP QL NAA+PROBE: NOT DETECTED

## 2021-02-02 PROCEDURE — 99214 OFFICE O/P EST MOD 30 MIN: CPT | Performed by: FAMILY MEDICINE

## 2021-02-02 NOTE — PROGRESS NOTES
Assessment/Plan       Diagnoses and all orders for this visit:    1. Acquired hypothyroidism (Primary)  -     TSH    2. Iron deficiency  -     Comprehensive Metabolic Panel  -     CBC & Differential  -     Iron and TIBC  -     Ferritin    3. Need for shingles vaccine  -     Zoster Vac Recomb Adjuvanted 50 MCG/0.5ML reconstituted suspension; Inject 0.5 mL into the appropriate muscle as directed by prescriber 1 (One) Time for 1 dose.  Dispense: 1 each; Refill: 1    4. Family history of cancer  -     Ambulatory Referral to Genetic Counseling/Testing - [facility]           Follow up: Return if symptoms worsen or fail to improve, for follow up depends on review of labs and testing.     DISCUSSION  Hypothyroidism.  Stable on levothyroxine 100 mcg daily.  Due for TSH.    Iron deficiency anemia.  Recheck CBC, CMP, iron panel and ferritin.    Need for shingles vaccine.  Prescription for that was given.    Family history of multiple cancers.  Refer to genetic counseling.          MEDICATIONS PRESCRIBED  Requested Prescriptions     Signed Prescriptions Disp Refills   • Zoster Vac Recomb Adjuvanted 50 MCG/0.5ML reconstituted suspension 1 each 1     Sig: Inject 0.5 mL into the appropriate muscle as directed by prescriber 1 (One) Time for 1 dose.          -------------------------------------------    Subjective     Chief Complaint   Patient presents with   • Hypertension   • Hand Pain         History of Present Illness    Hypothyroidism  On levothyroxine 112 mcg daily.  No side effects.  Due for blood work.  Energy levels been okay.    Iron deficiency anemia.  Due for repeat blood work.  Currently taking iron.  No side effects.    Shoulder pain  Has 2 tears.  To have surgery to have this repaired.    Right shoulder pain   To have surg    Some pain in the right hand and left thumb    No parent with hip fx  + fx  No tobacco  No alcohol            Social History     Tobacco Use   Smoking Status Never Smoker   Smokeless Tobacco  "Never Used          Past Medical History,Medications, Allergies, and social history was reviewed.          Review of Systems   Constitutional: Negative.    HENT: Negative.    Respiratory: Negative.    Cardiovascular: Negative.    Gastrointestinal: Negative.    Musculoskeletal: Positive for arthralgias ( Hands and shoulders).   Neurological: Negative.    Psychiatric/Behavioral: Negative.  Positive for depressed mood ( During the winter).       Objective     Vitals:    02/02/21 1239   BP: 100/62   Pulse: 76   Resp: 18   Temp: 98.2 °F (36.8 °C)   Weight: 85.3 kg (188 lb)   Height: 151.1 cm (59.5\")          Physical Exam  Vitals signs and nursing note reviewed.   Constitutional:       Appearance: She is well-developed.   HENT:      Head: Normocephalic and atraumatic.      Right Ear: Hearing and external ear normal.      Left Ear: Hearing and external ear normal.   Eyes:      Conjunctiva/sclera: Conjunctivae normal.      Pupils: Pupils are equal, round, and reactive to light.   Neck:      Musculoskeletal: Normal range of motion.   Cardiovascular:      Rate and Rhythm: Normal rate and regular rhythm.      Heart sounds: Normal heart sounds. No murmur. No friction rub.   Pulmonary:      Effort: Pulmonary effort is normal. No respiratory distress.      Breath sounds: Normal breath sounds. No wheezing or rales.   Skin:     General: Skin is warm.   Neurological:      Mental Status: She is alert and oriented to person, place, and time.   Psychiatric:         Behavior: Behavior normal.       Reviewed MRI of shoulder              Masoud Mratin MD    "

## 2021-02-03 LAB
ALBUMIN SERPL-MCNC: 4.1 G/DL (ref 3.8–4.8)
ALBUMIN/GLOB SERPL: 1.5 {RATIO} (ref 1.2–2.2)
ALP SERPL-CCNC: 99 IU/L (ref 39–117)
ALT SERPL-CCNC: 17 IU/L (ref 0–32)
AST SERPL-CCNC: 27 IU/L (ref 0–40)
BASOPHILS # BLD AUTO: 0 X10E3/UL (ref 0–0.2)
BASOPHILS NFR BLD AUTO: 0 %
BILIRUB SERPL-MCNC: 0.3 MG/DL (ref 0–1.2)
BUN SERPL-MCNC: 19 MG/DL (ref 8–27)
BUN/CREAT SERPL: 27 (ref 12–28)
CALCIUM SERPL-MCNC: 9.6 MG/DL (ref 8.7–10.3)
CHLORIDE SERPL-SCNC: 100 MMOL/L (ref 96–106)
CO2 SERPL-SCNC: 27 MMOL/L (ref 20–29)
CREAT SERPL-MCNC: 0.71 MG/DL (ref 0.57–1)
EOSINOPHIL # BLD AUTO: 0.2 X10E3/UL (ref 0–0.4)
EOSINOPHIL NFR BLD AUTO: 2 %
ERYTHROCYTE [DISTWIDTH] IN BLOOD BY AUTOMATED COUNT: 13.1 % (ref 11.7–15.4)
FERRITIN SERPL-MCNC: 106 NG/ML (ref 15–150)
GLOBULIN SER CALC-MCNC: 2.7 G/DL (ref 1.5–4.5)
GLUCOSE SERPL-MCNC: 77 MG/DL (ref 65–99)
HCT VFR BLD AUTO: 43.5 % (ref 34–46.6)
HGB BLD-MCNC: 14.4 G/DL (ref 11.1–15.9)
IMM GRANULOCYTES # BLD AUTO: 0 X10E3/UL (ref 0–0.1)
IMM GRANULOCYTES NFR BLD AUTO: 0 %
IRON SATN MFR SERPL: 28 % (ref 15–55)
IRON SERPL-MCNC: 92 UG/DL (ref 27–139)
LYMPHOCYTES # BLD AUTO: 2.2 X10E3/UL (ref 0.7–3.1)
LYMPHOCYTES NFR BLD AUTO: 33 %
MCH RBC QN AUTO: 30 PG (ref 26.6–33)
MCHC RBC AUTO-ENTMCNC: 33.1 G/DL (ref 31.5–35.7)
MCV RBC AUTO: 91 FL (ref 79–97)
MONOCYTES # BLD AUTO: 0.5 X10E3/UL (ref 0.1–0.9)
MONOCYTES NFR BLD AUTO: 7 %
NEUTROPHILS # BLD AUTO: 3.9 X10E3/UL (ref 1.4–7)
NEUTROPHILS NFR BLD AUTO: 58 %
PLATELET # BLD AUTO: 270 X10E3/UL (ref 150–450)
POTASSIUM SERPL-SCNC: 5.1 MMOL/L (ref 3.5–5.2)
PROT SERPL-MCNC: 6.8 G/DL (ref 6–8.5)
RBC # BLD AUTO: 4.8 X10E6/UL (ref 3.77–5.28)
SODIUM SERPL-SCNC: 141 MMOL/L (ref 134–144)
TIBC SERPL-MCNC: 325 UG/DL (ref 250–450)
TSH SERPL DL<=0.005 MIU/L-ACNC: 2.83 UIU/ML (ref 0.45–4.5)
UIBC SERPL-MCNC: 233 UG/DL (ref 118–369)
WBC # BLD AUTO: 6.9 X10E3/UL (ref 3.4–10.8)

## 2021-02-08 ENCOUNTER — OFFICE VISIT (OUTPATIENT)
Dept: ORTHOPEDIC SURGERY | Facility: CLINIC | Age: 66
End: 2021-02-08

## 2021-02-08 DIAGNOSIS — Z98.890 STATUS POST RIGHT ROTATOR CUFF REPAIR: Primary | ICD-10-CM

## 2021-02-08 PROCEDURE — 99024 POSTOP FOLLOW-UP VISIT: CPT | Performed by: ORTHOPAEDIC SURGERY

## 2021-02-08 NOTE — PROGRESS NOTES
Chief Complaint   Patient presents with   • Post-op     4 days status post right shoulder arthroscopy with rotator cuff repair 02/04/2021           HPI  Op Note by Berna Mitchell RegSched Rep (02/08/2021 00:00)    She is doing well with no complaints.    There were no vitals filed for this visit.      Physical Exam:  Portals look good.  Neurologically intact.        ICD-10-CM ICD-9-CM   1. Status post right rotator cuff repair  Z98.890 V45.89     She had a massive retracted tear.  She will continue the sling and follow-up in 3 weeks to start physical therapy.

## 2021-03-01 ENCOUNTER — OFFICE VISIT (OUTPATIENT)
Dept: ORTHOPEDIC SURGERY | Facility: CLINIC | Age: 66
End: 2021-03-01

## 2021-03-01 DIAGNOSIS — Z98.890 STATUS POST RIGHT ROTATOR CUFF REPAIR: Primary | ICD-10-CM

## 2021-03-01 PROCEDURE — 99024 POSTOP FOLLOW-UP VISIT: CPT | Performed by: ORTHOPAEDIC SURGERY

## 2021-03-01 NOTE — PROGRESS NOTES
Chief Complaint   Patient presents with   • Post-op Follow-up     3 week follow up - 4 weeks status post right shoulder arthroscopy with rotator cuff repair 02/04/2021           HPI  She is doing well.  No new complaints.  She stopped wearing her sling couple weeks ago.      There were no vitals filed for this visit.      Physical Exam:    She has full motion.  Shoulder is weak.        ICD-10-CM ICD-9-CM   1. Status post right rotator cuff repair  Z98.890 V45.89       Orders Placed This Encounter   Procedures   • Ambulatory Referral to Physical Therapy     She will start physical therapy at [facility] physical therapy.  Follow-up in 1 month.

## 2021-03-29 DIAGNOSIS — E61.1 IRON DEFICIENCY: ICD-10-CM

## 2021-03-29 DIAGNOSIS — E03.9 ACQUIRED HYPOTHYROIDISM: ICD-10-CM

## 2021-03-29 DIAGNOSIS — I10 ESSENTIAL HYPERTENSION: ICD-10-CM

## 2021-03-29 RX ORDER — FERROUS SULFATE 325(65) MG
TABLET ORAL
Qty: 30 TABLET | Refills: 5 | Status: SHIPPED | OUTPATIENT
Start: 2021-03-29 | End: 2021-10-15

## 2021-03-29 RX ORDER — LOSARTAN POTASSIUM 100 MG/1
TABLET ORAL
Qty: 30 TABLET | Refills: 5 | Status: SHIPPED | OUTPATIENT
Start: 2021-03-29 | End: 2021-10-15

## 2021-03-29 RX ORDER — OMEPRAZOLE 20 MG/1
CAPSULE, DELAYED RELEASE ORAL
Qty: 30 CAPSULE | Refills: 5 | Status: SHIPPED | OUTPATIENT
Start: 2021-03-29 | End: 2021-10-15

## 2021-03-29 RX ORDER — LEVOTHYROXINE SODIUM 112 UG/1
TABLET ORAL
Qty: 30 TABLET | Refills: 5 | Status: SHIPPED | OUTPATIENT
Start: 2021-03-29 | End: 2021-10-15

## 2021-03-30 DIAGNOSIS — F41.9 ANXIETY: ICD-10-CM

## 2021-03-30 RX ORDER — VENLAFAXINE HYDROCHLORIDE 150 MG/1
CAPSULE, EXTENDED RELEASE ORAL
Qty: 30 CAPSULE | Refills: 5 | Status: SHIPPED | OUTPATIENT
Start: 2021-03-30 | End: 2021-10-13

## 2021-04-05 ENCOUNTER — OFFICE VISIT (OUTPATIENT)
Dept: ORTHOPEDIC SURGERY | Facility: CLINIC | Age: 66
End: 2021-04-05

## 2021-04-05 DIAGNOSIS — Z98.890 STATUS POST RIGHT ROTATOR CUFF REPAIR: Primary | ICD-10-CM

## 2021-04-05 PROCEDURE — 99024 POSTOP FOLLOW-UP VISIT: CPT | Performed by: ORTHOPAEDIC SURGERY

## 2021-04-05 NOTE — PROGRESS NOTES
Chief Complaint   Patient presents with   • Post-op     4 week recheck -  status post right shoulder arthroscopy with rotator cuff repair 02/04/2021           HPI  She is doing better.  She goes to physical therapy at [facility].      There were no vitals filed for this visit.      Physical Exam:    She has near full motion.        ICD-10-CM ICD-9-CM   1. Status post right rotator cuff repair  Z98.890 V45.89       Orders Placed This Encounter   Procedures   • Ambulatory Referral to Physical Therapy   She will continue physical therapy at [facility] physical therapy.  She is retired

## 2021-04-22 ENCOUNTER — CLINICAL SUPPORT (OUTPATIENT)
Dept: GENETICS | Facility: HOSPITAL | Age: 66
End: 2021-04-22

## 2021-04-22 DIAGNOSIS — Z80.1 FAMILY HISTORY OF LUNG CANCER: ICD-10-CM

## 2021-04-22 DIAGNOSIS — Z80.0 FAMILY HISTORY OF COLON CANCER: ICD-10-CM

## 2021-04-22 DIAGNOSIS — Z13.79 GENETIC TESTING: Primary | ICD-10-CM

## 2021-04-22 DIAGNOSIS — Z80.8 FAMILY HISTORY OF NONMELANOMA SKIN CANCER: ICD-10-CM

## 2021-04-22 DIAGNOSIS — Z85.3 HISTORY OF BREAST CANCER: ICD-10-CM

## 2021-04-22 PROCEDURE — 96040: CPT | Performed by: GENETIC COUNSELOR, MS

## 2021-04-28 NOTE — PROGRESS NOTES
SUMMARY: Genetic testing not pursued since Ms. Lu does not meet NCCN guidelines criteria for genetic testing at this time.    Connie Lu, a 65-year-old female, was seen for genetic counseling due to a personal history of breast cancer. Ms. Lu was diagnosed with an ER+ breast cancer of the left breast at age 54, for which she underwent a left mastectomy. Ms. Lu also has a history of skin cancer on her nose (basal cell carcinoma) in her late 50s. She retains her uterus and ovaries.  Ms. Lu has had screening colonoscopies every 3-5 years, and reports that she has had a couple of polyps removed in the past (less than five polyps cumulative). Ms. Lu was interested in discussing her risk for a hereditary cancer syndrome and genetic testing recommendations.     PERTINENT FAMILY HISTORY: (See attached pedigree)   Brother:  Skin cancer (BCC/SCC), 50s  Mother:  Skin cancer, 81  Mat. Grandfather: Colon cancer, 70s  Mat. Great Aunt: Lung cancer, 91  Father:   Leukemia, 62; Skin cancer (BCC/SCC)    We do not have medical records regarding any of these diagnoses.      RISK ASSESSMENT:  Ms. Lu’s personal and family history of cancer led to the question of a hereditary cancer syndrome.  The ages of diagnosis in the patient and the combination of types of cancers seen in the family do not reveal a pattern likely to be associated with a specific hereditary cancer syndrome.  We reviewed NCCN guidelines criteria for genetic testing.   Based on the family history information available at this time, including the ages and combination of diagnoses reported, Ms. Lu does not meet NCCN guidelines criteria for testing for a hereditary cancer syndrome, which indicates a low likelihood of a hereditary cancer syndrome.  Insurance typically defers to NCCN guidelines in determining coverage for genetic testing.  We discussed that there is variability in how hereditary cancer syndromes can  present, and that the incomplete penetrance that is seen with hereditary cancer syndromes may also impact family history presentation.  We discussed self-pay options for available for genetic testing in cases where an individual is interested in pursuing testing and insurance does not cover testing due to NCCN guidelines not being met.  Ms. Lu opted not to pursue testing at this time.  These risk assessments are based on the family history information provided at the time of the appointment, and could change in the future should new information be obtained.    GENETIC COUNSELING: (30 minutes) We reviewed the family history information in detail. Cases of cancer follow three general patterns: sporadic, familial, and hereditary.  While most cancer is sporadic, some cases appear to occur in family clusters.  These cases are said to be familial and account for 10-20% of cancer cases.  Familial cases may be due to a combination of shared genes and environmental factors among family members.  In even fewer families, the cancer is said to be inherited, and the genes responsible for the cancer are known.  Family histories typical of hereditary cancer syndromes usually include multiple first- and second-degree relatives diagnosed with cancer types that define a syndrome.  These cases tend to be diagnosed at younger-than-expected ages and can be bilateral or multifocal.  The cancer in these families follows an autosomal dominant inheritance pattern, which indicates the likely presence of a mutation in a cancer susceptibility gene.  Children and siblings of an individual believed to carry this mutation have a 50% chance of inheriting that mutation, thereby inheriting the increased risk to develop cancer.  These mutations can be passed down from the maternal or the paternal lineage.    Hereditary breast cancer accounts for 5-10% of all cases of breast cancer.  A significant proportion of hereditary breast and ovarian  cancer can be attributed to mutations in the BRCA1 and BRCA2 genes.  Mutations in these genes confer an increased risk for breast cancer, ovarian cancer, male breast cancer, prostate cancer, and pancreatic cancer.  We discussed that there are other genes that are known to be associated with an increased risk for cancer and discussed the comprehensive multigene panel approach to genetic testing.  Many of these genes evaluated have well defined cancer risks and established management guidelines, while other genes included have been more recently described, and there may be less data regarding the risks and therefore may not have established management guidelines. Ms. Lu did not pursue genetic testing at this time due to not meeting NCCN guidelines criteria for testing.      GENETIC TESTING:  The risks, benefits and limitations of genetic testing and implications for clinical management following testing were reviewed.  DNA test results can influence decisions regarding screening, prevention and surgical management.  Genetic testing can have significant psychological implications for both individuals and families. We discussed the option of genetic testing through a multigene panel, which would involve testing multiple genes that are associated with increased cancer risk. Given her personal and family history, a negative test result would not eliminate all cancer risk to her relatives, although the risk would not be as high as it would with positive genetic testing.  Due to the low likelihood of a hereditary cancer syndrome, and based on not meeting NCCN guidelines criteria for genetic testing, Ms. Lu did not pursue testing at this time.      CLINICAL MANAGEMENT GUIDELINES: Ms. Lu’s management and surveillance should be determined by her oncology team. Her female relatives may have a slightly increased lifetime risk for breast cancer based on her personal history of breast cancer. Female relatives  could have a risk assessment performed using a family history-based model, such as the Tyrer-Cuzick model, to determine their individual risks. Surveillance for individuals with a high lifetime risk of breast cancer (>20%, versus the average risk of 12%), based on NCCN guidelines, would consist of semi-annual clinical breast exams and monthly self-breast exams starting by age 18 and annual mammography starting 10 years younger than the earliest diagnosis in a close relative, or starting by age 40.  According to an American Cancer Society expert panel, annual breast MRI should be offered to women whose lifetime risk of breast cancer is 20-25 percent or more, also starting by age 40 or earlier if indicated by family history.      PLAN: Ms. Lu does not meet NCCN guidelines criteria for genetic testing, and did not opt to pursue genetic testing today.  Genetic counseling remains available to Ms. Lu.  If information changes regarding Ms. Lu’s personal or family history, we would be happy to reassess Ms. Lu’s risk or reevaluate the family’s risk for a hereditary cancer syndrome.  Ms. Lu is welcome to contact us with any questions at [phone].       Summer Stubbs MS, [facility] [place] [place]  Licensed Certified Genetic Counselor       Cc: Connie Martin MD

## 2021-05-03 ENCOUNTER — OFFICE VISIT (OUTPATIENT)
Dept: ORTHOPEDIC SURGERY | Facility: CLINIC | Age: 66
End: 2021-05-03

## 2021-05-03 DIAGNOSIS — Z98.890 STATUS POST RIGHT ROTATOR CUFF REPAIR: Primary | ICD-10-CM

## 2021-05-03 PROCEDURE — 99024 POSTOP FOLLOW-UP VISIT: CPT | Performed by: ORTHOPAEDIC SURGERY

## 2021-05-03 RX ORDER — LIFITEGRAST 50 MG/ML
SOLUTION/ DROPS OPHTHALMIC
COMMUNITY
Start: 2021-04-09 | End: 2021-09-21

## 2021-05-03 NOTE — PROGRESS NOTES
Chief Complaint   Patient presents with   • Follow-up     1 month follow up; 12 weeks status post right shoulder arthroscopy with rotator cuff repair 02/04/2021           HPI  She is doing well.  She goes to [facility] physical therapy.  She says that they believe she is doing well      There were no vitals filed for this visit.      Physical Exam:  He has full motion.  4+ out of 5 strength.      ICD-10-CM ICD-9-CM   1. Status post right rotator cuff repair  Z98.890 V45.89       Orders Placed This Encounter   Procedures   • Ambulatory Referral to Physical Therapy     She is doing well.  She will continue with physical therapy and follow-up in 1 month.

## 2021-05-26 ENCOUNTER — OFFICE VISIT (OUTPATIENT)
Dept: FAMILY MEDICINE CLINIC | Facility: CLINIC | Age: 66
End: 2021-05-26

## 2021-05-26 VITALS
SYSTOLIC BLOOD PRESSURE: 108 MMHG | BODY MASS INDEX: 39.6 KG/M2 | DIASTOLIC BLOOD PRESSURE: 75 MMHG | HEART RATE: 78 BPM | HEIGHT: 59 IN | WEIGHT: 196.4 LBS | TEMPERATURE: 98.2 F

## 2021-05-26 DIAGNOSIS — H66.002 NON-RECURRENT ACUTE SUPPURATIVE OTITIS MEDIA OF LEFT EAR WITHOUT SPONTANEOUS RUPTURE OF TYMPANIC MEMBRANE: Primary | ICD-10-CM

## 2021-05-26 DIAGNOSIS — J01.40 ACUTE NON-RECURRENT PANSINUSITIS: ICD-10-CM

## 2021-05-26 PROCEDURE — 99213 OFFICE O/P EST LOW 20 MIN: CPT | Performed by: FAMILY MEDICINE

## 2021-05-26 RX ORDER — LORATADINE 10 MG/1
10 TABLET ORAL DAILY
Qty: 30 TABLET | Refills: 0 | Status: SHIPPED | OUTPATIENT
Start: 2021-05-26 | End: 2021-06-23

## 2021-05-26 RX ORDER — AZITHROMYCIN 250 MG/1
TABLET, FILM COATED ORAL
Qty: 6 TABLET | Refills: 0 | Status: SHIPPED | OUTPATIENT
Start: 2021-05-26 | End: 2021-08-24

## 2021-05-26 NOTE — PROGRESS NOTES
"Chief Complaint  Right ear pain & HA    Subjective          Connie Lu presents to [facility]  Earache   There is pain in the right ear. This is a new problem. The current episode started in the past 7 days. The problem occurs constantly. There has been no fever. Associated symptoms include coughing and headaches. Pertinent negatives include no ear discharge or sore throat. She has tried nothing for the symptoms. The treatment provided no relief.   Sinusitis  This is a new problem. The current episode started in the past 7 days. There has been no fever. Associated symptoms include congestion, coughing, ear pain, headaches, a hoarse voice and sinus pressure. Pertinent negatives include no sore throat. Past treatments include nothing. The treatment provided no relief.       The following portions of the patient's history were reviewed and updated as appropriate: allergies, current medications, past family history, past medical history, past social history, past surgical history and problem list.    Objective   Vital Signs:   /75   Pulse 78   Temp 98.2 °F (36.8 °C)   Ht 151.1 cm (59.49\")   Wt 89.1 kg (196 lb 6.4 oz)   BMI 39.02 kg/m²     Physical Exam  Vitals and nursing note reviewed.   Constitutional:       Appearance: She is well-developed.   HENT:      Head: Normocephalic and atraumatic.      Right Ear: Tympanic membrane, ear canal and external ear normal.      Left Ear: External ear normal. A middle ear effusion is present. Tympanic membrane is injected.      Nose: Nasal tenderness and congestion present.      Mouth/Throat:      Mouth: Mucous membranes are moist.      Pharynx: Oropharyngeal exudate present.   Eyes:      Conjunctiva/sclera: Conjunctivae normal.   Cardiovascular:      Rate and Rhythm: Normal rate and regular rhythm.      Heart sounds: Normal heart sounds.   Pulmonary:      Effort: Pulmonary effort is normal.      Breath sounds: Normal breath " sounds. No wheezing or rhonchi.   Musculoskeletal:      Cervical back: Neck supple.   Lymphadenopathy:      Cervical: Cervical adenopathy present.   Skin:     General: Skin is warm and dry.   Neurological:      Mental Status: She is alert and oriented to person, place, and time.        Result Review :                 Assessment and Plan    Diagnoses and all orders for this visit:    1. Non-recurrent acute suppurative otitis media of left ear without spontaneous rupture of tympanic membrane (Primary)  -     azithromycin (Zithromax Z-Justin) 250 MG tablet; Take 2 tablets by mouth on day 1, then 1 tablet daily on days 2-5  Dispense: 6 tablet; Refill: 0  -     loratadine (Claritin) 10 MG tablet; Take 1 tablet by mouth Daily.  Dispense: 30 tablet; Refill: 0    2. Acute non-recurrent pansinusitis  -     azithromycin (Zithromax Z-Justin) 250 MG tablet; Take 2 tablets by mouth on day 1, then 1 tablet daily on days 2-5  Dispense: 6 tablet; Refill: 0  -     loratadine (Claritin) 10 MG tablet; Take 1 tablet by mouth Daily.  Dispense: 30 tablet; Refill: 0    Zpak and antihistamine to treat otitis media and sinusitis.  Recommended to use saline nasal gel or Vaseline to moisturize nose as needed.      Follow Up   Return if symptoms worsen or fail to improve.  Patient was given instructions and counseling regarding her condition or for health maintenance advice. Please see specific information pulled into the AVS if appropriate.

## 2021-05-27 DIAGNOSIS — F51.01 PRIMARY INSOMNIA: ICD-10-CM

## 2021-05-27 RX ORDER — TRAZODONE HYDROCHLORIDE 50 MG/1
TABLET ORAL
Qty: 60 TABLET | Refills: 5 | Status: SHIPPED | OUTPATIENT
Start: 2021-05-27 | End: 2021-11-12

## 2021-06-07 ENCOUNTER — OFFICE VISIT (OUTPATIENT)
Dept: ORTHOPEDIC SURGERY | Facility: CLINIC | Age: 66
End: 2021-06-07

## 2021-06-07 VITALS
DIASTOLIC BLOOD PRESSURE: 70 MMHG | BODY MASS INDEX: 39.6 KG/M2 | HEIGHT: 59 IN | SYSTOLIC BLOOD PRESSURE: 110 MMHG | WEIGHT: 196.43 LBS | HEART RATE: 80 BPM

## 2021-06-07 DIAGNOSIS — Z98.890 STATUS POST RIGHT ROTATOR CUFF REPAIR: Primary | ICD-10-CM

## 2021-06-07 PROCEDURE — 99212 OFFICE O/P EST SF 10 MIN: CPT | Performed by: ORTHOPAEDIC SURGERY

## 2021-06-07 NOTE — PROGRESS NOTES
[facility] Note    Subjective     CC: Post-op (5 week recheck ; 17 weeks status post right shoulder arthroscopy with rotator cuff repair 02/04/2021)      WM Lu is a 66 y.o. female.  She is doing well with no complaints.  She completely missed physical therapy last week and did not even miss it because she is doing so well.    Review of Systems   Constitutional: Negative.  Negative for chills, fatigue and fever.   HENT: Negative.  Negative for congestion and dental problem.    Eyes: Negative.  Negative for blurred vision.   Respiratory: Negative.  Negative for shortness of breath.    Cardiovascular: Negative.  Negative for leg swelling.   Gastrointestinal: Negative.  Negative for abdominal pain.   Endocrine: Negative.  Negative for polyuria.   Genitourinary: Negative.  Negative for difficulty urinating.   Musculoskeletal: Positive for arthralgias.   Skin: Negative.    Allergic/Immunologic: Negative.    Neurological: Negative.    Hematological: Negative.  Negative for adenopathy.   Psychiatric/Behavioral: Negative.  Negative for behavioral problems.       ROS:    Constiutional:Pt denies fever, chills, nausea, or vomiting.  MSK:as above      Objective      Past Medical History  Past Medical History:   Diagnosis Date   • ADHD (attention deficit hyperactivity disorder) 30+ yrs ago    no action taken   • Allergic annual    sinus infections   • Anemia 8/01/2018    approximately   • Anxiety    • Arthritis 2017    Knee replaced Dr Fontenot   • Asthma     worse with heat, has inhaler for rare need   • Attention deficit disorder    • Boil     states it was not MRSA, once, many years ago   • Breast cancer (CMS/[facility]) 2010    estrogen driven. S/p left mastectomy and right reduction. No requirements for radiation or chemo.  5 yrs tamoxifen   • Carpal tunnel syndrome    • Chronic cholecystitis with calculus     US stones Has epi pain, nausea   • Constipation     on linzess   •  "Depression    • Diverticulosis     noted on colonoscopy   • Gallstone    • Heart murmur     Has seen cardiologist 2017, had preop workup for left TKR   • Hiatal hernia with gastroesophageal reflux disease and esophagitis     controlled with esomeprazole.  EGD [facility] 6/18 HH, gr II esophagitis, superf antral ulcerations, 35 cm zline, path anturm neg h. pyl, stool ag 5/18 neg h. pyl.  path DE reflux   • History of blood transfusion     as an infant, unknown season.    • Hypertension    • Hypothyroidism    • Insomnia    • Iron deficiency     on iron supplements   • Joint pain     effexor helps. No NSAIDS, no injections.    • Kidney function abnormal     \"little low\" on previous labs, advised increased hydration   • Lower back pain    • Obesity 1978   • RADHA (obstructive sleep apnea)     Non CPAP compliant   • Polyp of sigmoid colon    • Prediabetes     Hb A1C 6.10 5/18   • Visual impairment 50 years    wear contacts/glasses         Physical Exam  /70   Pulse 80   Ht 151.1 cm (59.49\")   Wt 89.1 kg (196 lb 6.9 oz)   BMI 39.03 kg/m²     Body mass index is 39.03 kg/m².    Patient is well nourished and well developed.        Ortho Exam  Right shoulder has full motion full-strength.    Imaging/Labs/EMG Reviewed:  Imaging Results (Last 24 Hours)     ** No results found for the last 24 hours. **          Assessment:  1. Status post right rotator cuff repair        Plan:  1. Recommend over the counter anti-inflammatories for pain and/or swelling  2. She is doing great.  She will follow-up as needed.    Follow Up:   Return if symptoms worsen or fail to improve.      Medical Decision Making  Management Options : Low - OTC Drugs        Hernando Fernández M.D., FAAOS  Orthopedic Surgeon  Fellowship Trained Sports Medicine  [facility]  Orthopedics and Sports Medicine  [address], [address]  [place] [address]  "

## 2021-06-10 ENCOUNTER — TELEPHONE (OUTPATIENT)
Dept: FAMILY MEDICINE CLINIC | Facility: CLINIC | Age: 66
End: 2021-06-10

## 2021-06-10 DIAGNOSIS — L98.9 SKIN LESIONS: Primary | ICD-10-CM

## 2021-06-10 NOTE — TELEPHONE ENCOUNTER
Spoke with the patient, she said that the lesions have just developed and they are sore to the touch. She would like to be seen by someone in [place], Dr. Bolivar Fernández.

## 2021-06-10 NOTE — TELEPHONE ENCOUNTER
Please call. Have any of the lesions changed? Does she have a preference on where she wants to go.

## 2021-06-23 DIAGNOSIS — J01.40 ACUTE NON-RECURRENT PANSINUSITIS: ICD-10-CM

## 2021-06-23 DIAGNOSIS — H66.002 NON-RECURRENT ACUTE SUPPURATIVE OTITIS MEDIA OF LEFT EAR WITHOUT SPONTANEOUS RUPTURE OF TYMPANIC MEMBRANE: ICD-10-CM

## 2021-06-23 RX ORDER — LORATADINE 10 MG/1
TABLET ORAL
Qty: 30 TABLET | Refills: 0 | Status: SHIPPED | OUTPATIENT
Start: 2021-06-23 | End: 2021-07-22

## 2021-07-03 ENCOUNTER — HOSPITAL ENCOUNTER (EMERGENCY)
Age: 66
Discharge: HOME | End: 2021-07-03
Payer: COMMERCIAL

## 2021-07-03 VITALS
TEMPERATURE: 97.7 F | DIASTOLIC BLOOD PRESSURE: 66 MMHG | SYSTOLIC BLOOD PRESSURE: 150 MMHG | OXYGEN SATURATION: 95 % | HEART RATE: 82 BPM | RESPIRATION RATE: 16 BRPM

## 2021-07-03 VITALS
TEMPERATURE: 97.88 F | HEART RATE: 82 BPM | SYSTOLIC BLOOD PRESSURE: 150 MMHG | RESPIRATION RATE: 18 BRPM | DIASTOLIC BLOOD PRESSURE: 66 MMHG

## 2021-07-03 VITALS — BODY MASS INDEX: 39.6 KG/M2

## 2021-07-03 DIAGNOSIS — S52.531A: Primary | ICD-10-CM
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
## 2021-07-03 PROCEDURE — 99203 OFFICE O/P NEW LOW 30 MIN: CPT

## 2021-07-03 PROCEDURE — 73080 X-RAY EXAM OF ELBOW: CPT

## 2021-07-03 PROCEDURE — 73110 X-RAY EXAM OF WRIST: CPT

## 2021-07-03 PROCEDURE — 99202 OFFICE O/P NEW SF 15 MIN: CPT

## 2021-07-03 PROCEDURE — G0463 HOSPITAL OUTPT CLINIC VISIT: HCPCS

## 2021-07-03 PROCEDURE — 73030 X-RAY EXAM OF SHOULDER: CPT

## 2021-07-03 PROCEDURE — 29125 APPL SHORT ARM SPLINT STATIC: CPT

## 2021-07-06 ENCOUNTER — HOSPITAL ENCOUNTER (OUTPATIENT)
Age: 66
End: 2021-07-06
Payer: COMMERCIAL

## 2021-07-06 DIAGNOSIS — S52.531A: Primary | ICD-10-CM

## 2021-07-06 PROCEDURE — 73200 CT UPPER EXTREMITY W/O DYE: CPT

## 2021-07-07 ENCOUNTER — HOSPITAL ENCOUNTER (OUTPATIENT)
Age: 66
End: 2021-07-07
Payer: COMMERCIAL

## 2021-07-07 DIAGNOSIS — Z01.818: Primary | ICD-10-CM

## 2021-07-07 LAB
ALBUMIN LEVEL: 4.2 G/DL (ref 3.5–5)
ALBUMIN/GLOB SERPL: 1.6 {RATIO} (ref 1.1–1.8)
ALP ISO SERPL-ACNC: 106 U/L (ref 38–126)
ALT SERPLBLD-CCNC: 19 U/L (ref 12–78)
ANION GAP SERPL CALC-SCNC: 11.3 MEQ/L (ref 5–15)
AST SERPL QL: 31 U/L (ref 14–36)
BILIRUBIN,TOTAL: 0.6 MG/DL (ref 0.2–1.3)
BUN SERPL-MCNC: 18 MG/DL (ref 7–17)
CALCIUM SPEC-MCNC: 9.4 MG/DL (ref 8.4–10.2)
CHLORIDE SPEC-SCNC: 103 MMOL/L (ref 98–107)
CO2 SERPL-SCNC: 33 MMOL/L (ref 22–30)
CREAT BLD-SCNC: 0.7 MG/DL (ref 0.52–1.04)
ESTIMATED GLOMERULAR FILT RATE: 84 ML/MIN (ref 60–?)
GFR (AFRICAN AMERICAN): 102 ML/MIN (ref 60–?)
GLOBULIN SER CALC-MCNC: 2.6 G/DL (ref 1.3–3.2)
GLUCOSE: 90 MG/DL (ref 74–100)
HCT VFR BLD CALC: 43.8 % (ref 37–47)
HGB BLD-MCNC: 14.5 G/DL (ref 12.2–16.2)
MCHC RBC-ENTMCNC: 33.2 G/DL (ref 31.8–35.4)
MCV RBC: 93.1 FL (ref 81–99)
MEAN CORPUSCULAR HEMOGLOBIN: 30.9 PG (ref 27–31.2)
PLATELET # BLD: 273 K/MM3 (ref 142–424)
POTASSIUM: 5.3 MMOL/L (ref 3.5–5.1)
PROT SERPL-MCNC: 6.8 G/DL (ref 6.3–8.2)
RBC # BLD AUTO: 4.71 M/MM3 (ref 4.2–5.4)
SODIUM SPEC-SCNC: 142 MMOL/L (ref 136–145)
WBC # BLD AUTO: 7.3 K/MM3 (ref 4.8–10.8)

## 2021-07-07 PROCEDURE — 85025 COMPLETE CBC W/AUTO DIFF WBC: CPT

## 2021-07-07 PROCEDURE — 71046 X-RAY EXAM CHEST 2 VIEWS: CPT

## 2021-07-07 PROCEDURE — 36415 COLL VENOUS BLD VENIPUNCTURE: CPT

## 2021-07-07 PROCEDURE — 80053 COMPREHEN METABOLIC PANEL: CPT

## 2021-07-09 ENCOUNTER — HOSPITAL ENCOUNTER (OUTPATIENT)
Age: 66
End: 2021-07-09
Payer: COMMERCIAL

## 2021-07-09 DIAGNOSIS — Z11.52: ICD-10-CM

## 2021-07-09 DIAGNOSIS — Z01.818: Primary | ICD-10-CM

## 2021-07-09 DIAGNOSIS — S52.531A: ICD-10-CM

## 2021-07-09 PROCEDURE — U0003 INFECTIOUS AGENT DETECTION BY NUCLEIC ACID (DNA OR RNA); SEVERE ACUTE RESPIRATORY SYNDROME CORONAVIRUS 2 (SARS-COV-2) (CORONAVIRUS DISEASE [COVID-19]), AMPLIFIED PROBE TECHNIQUE, MAKING USE OF HIGH THROUGHPUT TECHNOLOGIES AS DESCRIBED BY CMS-2020-01-R: HCPCS

## 2021-07-12 ENCOUNTER — HOSPITAL ENCOUNTER (OUTPATIENT)
Age: 66
Discharge: HOME | End: 2021-07-12
Payer: COMMERCIAL

## 2021-07-12 VITALS
OXYGEN SATURATION: 92 % | RESPIRATION RATE: 16 BRPM | HEART RATE: 88 BPM | SYSTOLIC BLOOD PRESSURE: 141 MMHG | DIASTOLIC BLOOD PRESSURE: 70 MMHG | TEMPERATURE: 98.6 F

## 2021-07-12 VITALS
SYSTOLIC BLOOD PRESSURE: 123 MMHG | DIASTOLIC BLOOD PRESSURE: 62 MMHG | HEART RATE: 72 BPM | RESPIRATION RATE: 16 BRPM | TEMPERATURE: 97.5 F | OXYGEN SATURATION: 100 %

## 2021-07-12 VITALS
RESPIRATION RATE: 12 BRPM | SYSTOLIC BLOOD PRESSURE: 141 MMHG | TEMPERATURE: 98.6 F | HEART RATE: 85 BPM | OXYGEN SATURATION: 92 % | DIASTOLIC BLOOD PRESSURE: 70 MMHG

## 2021-07-12 VITALS
DIASTOLIC BLOOD PRESSURE: 79 MMHG | SYSTOLIC BLOOD PRESSURE: 139 MMHG | RESPIRATION RATE: 15 BRPM | HEART RATE: 85 BPM | OXYGEN SATURATION: 94 %

## 2021-07-12 VITALS
OXYGEN SATURATION: 95 % | SYSTOLIC BLOOD PRESSURE: 124 MMHG | DIASTOLIC BLOOD PRESSURE: 74 MMHG | RESPIRATION RATE: 16 BRPM | HEART RATE: 90 BPM

## 2021-07-12 VITALS
TEMPERATURE: 98.3 F | HEART RATE: 83 BPM | SYSTOLIC BLOOD PRESSURE: 122 MMHG | OXYGEN SATURATION: 95 % | RESPIRATION RATE: 16 BRPM | DIASTOLIC BLOOD PRESSURE: 56 MMHG

## 2021-07-12 VITALS
SYSTOLIC BLOOD PRESSURE: 110 MMHG | TEMPERATURE: 97.88 F | OXYGEN SATURATION: 95 % | DIASTOLIC BLOOD PRESSURE: 71 MMHG | RESPIRATION RATE: 16 BRPM | HEART RATE: 81 BPM

## 2021-07-12 VITALS
DIASTOLIC BLOOD PRESSURE: 58 MMHG | OXYGEN SATURATION: 93 % | RESPIRATION RATE: 14 BRPM | HEART RATE: 83 BPM | SYSTOLIC BLOOD PRESSURE: 129 MMHG

## 2021-07-12 VITALS
DIASTOLIC BLOOD PRESSURE: 73 MMHG | RESPIRATION RATE: 16 BRPM | HEART RATE: 82 BPM | SYSTOLIC BLOOD PRESSURE: 129 MMHG | OXYGEN SATURATION: 95 %

## 2021-07-12 VITALS — BODY MASS INDEX: 39.6 KG/M2
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
## 2021-07-12 DIAGNOSIS — S52.571A: Primary | ICD-10-CM
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
## 2021-07-12 PROCEDURE — C1713 ANCHOR/SCREW BN/BN,TIS/BN: HCPCS

## 2021-07-12 PROCEDURE — 20690 APPL UNIPLN UNI EXT FIXJ SYS: CPT

## 2021-07-12 PROCEDURE — 25608 OPTX DST RD XART FX/EPI SEP2: CPT

## 2021-07-12 PROCEDURE — C1776 JOINT DEVICE (IMPLANTABLE): HCPCS

## 2021-07-12 PROCEDURE — 76000 FLUOROSCOPY <1 HR PHYS/QHP: CPT

## 2021-07-12 PROCEDURE — 96374 THER/PROPH/DIAG INJ IV PUSH: CPT

## 2021-07-12 PROCEDURE — 73100 X-RAY EXAM OF WRIST: CPT

## 2021-07-13 VITALS — SYSTOLIC BLOOD PRESSURE: 122 MMHG | TEMPERATURE: 98.24 F | DIASTOLIC BLOOD PRESSURE: 56 MMHG | HEART RATE: 83 BPM

## 2021-07-20 ENCOUNTER — HOSPITAL ENCOUNTER (OUTPATIENT)
Age: 66
End: 2021-07-20
Payer: COMMERCIAL

## 2021-07-20 ENCOUNTER — HOSPITAL ENCOUNTER (OUTPATIENT)
Dept: HOSPITAL 22 - OT | Age: 66
Discharge: HOME | End: 2021-07-20
Payer: COMMERCIAL

## 2021-07-20 DIAGNOSIS — S52.531D: Primary | ICD-10-CM

## 2021-07-20 DIAGNOSIS — S52.531A: Primary | ICD-10-CM

## 2021-07-20 DIAGNOSIS — Z09: ICD-10-CM

## 2021-07-20 PROCEDURE — 97763 ORTHC/PROSTC MGMT SBSQ ENC: CPT

## 2021-07-20 PROCEDURE — 73110 X-RAY EXAM OF WRIST: CPT

## 2021-07-22 DIAGNOSIS — H66.002 NON-RECURRENT ACUTE SUPPURATIVE OTITIS MEDIA OF LEFT EAR WITHOUT SPONTANEOUS RUPTURE OF TYMPANIC MEMBRANE: ICD-10-CM

## 2021-07-22 DIAGNOSIS — J01.40 ACUTE NON-RECURRENT PANSINUSITIS: ICD-10-CM

## 2021-07-22 RX ORDER — LORATADINE 10 MG/1
TABLET ORAL
Qty: 30 TABLET | Refills: 0 | Status: SHIPPED | OUTPATIENT
Start: 2021-07-22 | End: 2021-08-19

## 2021-07-27 ENCOUNTER — HOSPITAL ENCOUNTER (OUTPATIENT)
Age: 66
End: 2021-07-27
Payer: COMMERCIAL

## 2021-07-27 DIAGNOSIS — Z09: Primary | ICD-10-CM

## 2021-07-27 DIAGNOSIS — S52.571D: ICD-10-CM

## 2021-07-27 PROCEDURE — 73110 X-RAY EXAM OF WRIST: CPT

## 2021-08-18 ENCOUNTER — HOSPITAL ENCOUNTER (OUTPATIENT)
Age: 66
End: 2021-08-18
Payer: COMMERCIAL

## 2021-08-18 DIAGNOSIS — Z09: ICD-10-CM

## 2021-08-18 DIAGNOSIS — S52.531A: Primary | ICD-10-CM

## 2021-08-18 PROCEDURE — 73110 X-RAY EXAM OF WRIST: CPT

## 2021-08-19 DIAGNOSIS — J01.40 ACUTE NON-RECURRENT PANSINUSITIS: ICD-10-CM

## 2021-08-19 DIAGNOSIS — H66.002 NON-RECURRENT ACUTE SUPPURATIVE OTITIS MEDIA OF LEFT EAR WITHOUT SPONTANEOUS RUPTURE OF TYMPANIC MEMBRANE: ICD-10-CM

## 2021-08-19 RX ORDER — LORATADINE 10 MG/1
TABLET ORAL
Qty: 30 TABLET | Refills: 0 | Status: SHIPPED | OUTPATIENT
Start: 2021-08-19 | End: 2021-09-16

## 2021-08-24 ENCOUNTER — OFFICE VISIT (OUTPATIENT)
Dept: FAMILY MEDICINE CLINIC | Facility: CLINIC | Age: 66
End: 2021-08-24

## 2021-08-24 VITALS
BODY MASS INDEX: 38.28 KG/M2 | TEMPERATURE: 97.1 F | HEART RATE: 93 BPM | WEIGHT: 195 LBS | RESPIRATION RATE: 18 BRPM | DIASTOLIC BLOOD PRESSURE: 76 MMHG | HEIGHT: 60 IN | OXYGEN SATURATION: 94 % | SYSTOLIC BLOOD PRESSURE: 124 MMHG

## 2021-08-24 DIAGNOSIS — J02.9 SORE THROAT: ICD-10-CM

## 2021-08-24 DIAGNOSIS — R05.9 COUGH: Primary | ICD-10-CM

## 2021-08-24 DIAGNOSIS — H92.03 OTALGIA OF BOTH EARS: ICD-10-CM

## 2021-08-24 DIAGNOSIS — R50.9 FEVER, UNSPECIFIED FEVER CAUSE: ICD-10-CM

## 2021-08-24 PROCEDURE — 99213 OFFICE O/P EST LOW 20 MIN: CPT | Performed by: FAMILY MEDICINE

## 2021-08-24 RX ORDER — AZITHROMYCIN 250 MG/1
TABLET, FILM COATED ORAL
Qty: 6 TABLET | Refills: 0 | Status: SHIPPED | OUTPATIENT
Start: 2021-08-24 | End: 2021-09-21

## 2021-08-24 NOTE — PROGRESS NOTES
Assessment/Plan       Diagnoses and all orders for this visit:    1. Cough (Primary)  -     azithromycin (Zithromax) 250 MG tablet; Take 2 tablets the first day, then 1 tablet daily for 4 days.  Dispense: 6 tablet; Refill: 0  -     COVID-19,[facility] ROUTINE, NP/OP SWAB IN TRANSPORT MEDIA OR ESWAB 72 HR TAT - Swab, Anterior nasal    2. Sore throat  -     azithromycin (Zithromax) 250 MG tablet; Take 2 tablets the first day, then 1 tablet daily for 4 days.  Dispense: 6 tablet; Refill: 0  -     COVID-19,[facility] ROUTINE, NP/OP SWAB IN TRANSPORT MEDIA OR ESWAB 72 HR TAT - Swab, Anterior nasal    3. Otalgia of both ears  -     COVID-19,[facility] ROUTINE, NP/OP SWAB IN TRANSPORT MEDIA OR ESWAB 72 HR TAT - Swab, Anterior nasal    4. Fever, unspecified fever cause  -     azithromycin (Zithromax) 250 MG tablet; Take 2 tablets the first day, then 1 tablet daily for 4 days.  Dispense: 6 tablet; Refill: 0  -     COVID-19,[facility] ROUTINE, NP/OP SWAB IN TRANSPORT MEDIA OR ESWAB 72 HR TAT - Swab, Anterior nasal           Follow up: Return for follow up depends on review of labs and testing.     DISCUSSION  Multiple symptoms including cough, sore throat, ear pain and fever. May be bacterial infection so start Z-Justin. Check Covid as well. Remain isolated till Covid back.          MEDICATIONS PRESCRIBED  Requested Prescriptions     Signed Prescriptions Disp Refills   • azithromycin (Zithromax) 250 MG tablet 6 tablet 0     Sig: Take 2 tablets the first day, then 1 tablet daily for 4 days.               -------------------------------------------    Subjective     Chief Complaint   Patient presents with   • Cough   • Sore Throat   • Earache   • Headache         History of Present Illness    Earache, off and on all summer   + st x 3 days  neighbor burned trash and then had Sore throat  Prod cough  + fever ( 99.6)  ++ headache  Worse 2 days ago  Used heating pad on head and some help    No  Headache now    No covid vaccine    No loss of  "taste or smell    No ill contacts    ==================  Recent right radial fracture  Has santos now  Had fallen back   Using brace      ==================  IBS + constipation  Tried Linzess in the past and caused diarrhea  ? Try Levsin   In the am, some loose stool  primarily constipated        Social History     Tobacco Use   Smoking Status Never Smoker   Smokeless Tobacco Never Used          Past Medical History,Medications, Allergies, and social history was reviewed.          Review of Systems   Constitutional: Positive for fatigue and fever.   HENT: Positive for congestion and ear pain.    Respiratory: Positive for cough.    Cardiovascular: Negative.    Gastrointestinal: Negative.        Objective     Vitals:    08/24/21 1152   BP: 124/76   Pulse: 93   Resp: 18   Temp: 97.1 °F (36.2 °C)   SpO2: 94%   Weight: 88.5 kg (195 lb)   Height: 151.1 cm (59.5\")          Physical Exam  Vitals and nursing note reviewed.   Constitutional:       Appearance: She is well-developed.   HENT:      Head: Normocephalic and atraumatic.      Right Ear: Hearing, tympanic membrane, ear canal and external ear normal.      Left Ear: Hearing, tympanic membrane, ear canal and external ear normal.      Mouth/Throat:      Pharynx: Posterior oropharyngeal erythema present. No oropharyngeal exudate.   Eyes:      Conjunctiva/sclera: Conjunctivae normal.      Pupils: Pupils are equal, round, and reactive to light.   Cardiovascular:      Rate and Rhythm: Normal rate and regular rhythm.      Heart sounds: Normal heart sounds. No murmur heard.   No friction rub.   Pulmonary:      Effort: Pulmonary effort is normal. No respiratory distress.      Breath sounds: Normal breath sounds. No wheezing or rales.   Lymphadenopathy:      Cervical: No cervical adenopathy.   Skin:     General: Skin is warm.   Neurological:      Mental Status: She is alert.   Psychiatric:         Behavior: Behavior normal.                     Masoud Martin MD    "

## 2021-08-25 LAB
LABCORP SARS-COV-2, NAA 2 DAY TAT: NORMAL
SARS-COV-2 RNA RESP QL NAA+PROBE: DETECTED

## 2021-08-26 ENCOUNTER — TELEPHONE (OUTPATIENT)
Dept: FAMILY MEDICINE CLINIC | Facility: CLINIC | Age: 66
End: 2021-08-26

## 2021-08-26 DIAGNOSIS — U07.1 COVID-19 VIRUS INFECTION: Primary | ICD-10-CM

## 2021-08-26 RX ORDER — DEXTROMETHORPHAN HYDROBROMIDE AND PROMETHAZINE HYDROCHLORIDE 15; 6.25 MG/5ML; MG/5ML
5 SYRUP ORAL 4 TIMES DAILY PRN
Qty: 180 ML | Refills: 0 | Status: SHIPPED | OUTPATIENT
Start: 2021-08-26 | End: 2021-09-21

## 2021-08-26 NOTE — TELEPHONE ENCOUNTER
Caller: Connie Lu    Relationship: Self    Best call back number: [phone]     What test was performed: COVID TEST     When was the test performed: 8-24-21    Where was the test performed: IN OFFICE     Additional notes: PLEASE CALL WITH RESULTS     ALSO PATIENT IS REQUESTING A MEDICATION FOR THE NASAL CONGESTION SHE IS HAVING   SHE STATES SHE IS COUGHING A LOT AND HAS A PRODUCTIVE COUGH

## 2021-09-01 ENCOUNTER — TELEPHONE (OUTPATIENT)
Dept: FAMILY MEDICINE CLINIC | Facility: CLINIC | Age: 66
End: 2021-09-01

## 2021-09-01 NOTE — TELEPHONE ENCOUNTER
Pt denies any SOA or difficulty breathing. She used a Netti Pot this morning to rinse all the drainage and she feels much better. Informed her to use plain Mucinex and increase water intake to thin secretions. I told her I would forward this to you for any further recommendations but she said, she thought this would be enough because she is feeling better.

## 2021-09-01 NOTE — TELEPHONE ENCOUNTER
Caller: Connie Lu    Relationship to patient: Self    Best call back number: [phone]     Date of exposure:08/23/21    Date of positive COVID19 test: 08/26/21    Date if possible COVID19 exposure: 08/23/21    COVID19 symptoms: FEVER AND COUGH    Date of initial quarantine: 08/24/21    Additional information or concerns: PATIENT IS CALLING STATED SHE IS COUGHING UP A LOT OF MUCUS AND WOULD LIKE A CALLBACK TO DISCUSS WHAT CAN BE SENT INTO THE PHARMACY FOR TREATMENT    What is the patients preferred pharmacy: [facility]

## 2021-09-01 NOTE — TELEPHONE ENCOUNTER
Yes.  Since she is feeling better, we will continue this and let me know if beginning to feel worse again.

## 2021-09-02 ENCOUNTER — TELEPHONE (OUTPATIENT)
Dept: FAMILY MEDICINE CLINIC | Facility: CLINIC | Age: 66
End: 2021-09-02

## 2021-09-02 NOTE — TELEPHONE ENCOUNTER
Did she  the cough syrup with promethazine and it initially.  If not, can try that or can send in a narcotic cough prescription.

## 2021-09-02 NOTE — TELEPHONE ENCOUNTER
[place]. She is just going to stick with the Netti Pot.

## 2021-09-03 ENCOUNTER — TELEPHONE (OUTPATIENT)
Dept: FAMILY MEDICINE CLINIC | Facility: CLINIC | Age: 66
End: 2021-09-03

## 2021-09-03 NOTE — TELEPHONE ENCOUNTER
Caller: Connie Lu    Relationship: Self    Best call back number: [phone]    What is the best time to reach you:ANYTIME    Who are you requesting to speak with (clinical staff, provider,  specific staff member): CLINICAL STAFF    Do you know the name of the person who called:     What was the call regarding: WHEN WILL HER QUARANTINE END    Do you require a callback: YES

## 2021-09-03 NOTE — TELEPHONE ENCOUNTER
Symptoms started on Sunday 8-22-21 and she tested positive here on 8-26-21.  She is feeling fine, no fever. Only symptom is residual cough.

## 2021-09-14 ENCOUNTER — HOSPITAL ENCOUNTER (OUTPATIENT)
Age: 66
End: 2021-09-14
Payer: COMMERCIAL

## 2021-09-14 DIAGNOSIS — S52.531D: ICD-10-CM

## 2021-09-14 DIAGNOSIS — Z09: Primary | ICD-10-CM

## 2021-09-14 PROCEDURE — 73110 X-RAY EXAM OF WRIST: CPT

## 2021-09-16 DIAGNOSIS — H66.002 NON-RECURRENT ACUTE SUPPURATIVE OTITIS MEDIA OF LEFT EAR WITHOUT SPONTANEOUS RUPTURE OF TYMPANIC MEMBRANE: ICD-10-CM

## 2021-09-16 DIAGNOSIS — J01.40 ACUTE NON-RECURRENT PANSINUSITIS: ICD-10-CM

## 2021-09-16 RX ORDER — LORATADINE 10 MG/1
TABLET ORAL
Qty: 30 TABLET | Refills: 0 | Status: SHIPPED | OUTPATIENT
Start: 2021-09-16 | End: 2021-09-21

## 2021-09-21 ENCOUNTER — OFFICE VISIT (OUTPATIENT)
Dept: FAMILY MEDICINE CLINIC | Facility: CLINIC | Age: 66
End: 2021-09-21

## 2021-09-21 ENCOUNTER — HOSPITAL ENCOUNTER (OUTPATIENT)
Age: 66
End: 2021-09-21
Payer: COMMERCIAL

## 2021-09-21 VITALS
OXYGEN SATURATION: 99 % | SYSTOLIC BLOOD PRESSURE: 132 MMHG | HEART RATE: 70 BPM | DIASTOLIC BLOOD PRESSURE: 82 MMHG | RESPIRATION RATE: 20 BRPM | TEMPERATURE: 98.1 F | HEIGHT: 60 IN | WEIGHT: 192 LBS | BODY MASS INDEX: 37.69 KG/M2

## 2021-09-21 DIAGNOSIS — S52.571D: ICD-10-CM

## 2021-09-21 DIAGNOSIS — U07.1: ICD-10-CM

## 2021-09-21 DIAGNOSIS — S69.91XD INJURY OF RIGHT WRIST, SUBSEQUENT ENCOUNTER: ICD-10-CM

## 2021-09-21 DIAGNOSIS — Z01.818 PRE-OP EXAMINATION: Primary | ICD-10-CM

## 2021-09-21 DIAGNOSIS — Z11.52: ICD-10-CM

## 2021-09-21 DIAGNOSIS — Z01.812: Primary | ICD-10-CM

## 2021-09-21 LAB
ALBUMIN LEVEL: 3.9 G/DL (ref 3.5–5)
ALBUMIN/GLOB SERPL: 1.4 {RATIO} (ref 1.1–1.8)
ALP ISO SERPL-ACNC: 95 U/L (ref 38–126)
ALT SERPLBLD-CCNC: 17 U/L (ref 12–78)
ANION GAP SERPL CALC-SCNC: 13.1 MEQ/L (ref 5–15)
AST SERPL QL: 29 U/L (ref 14–36)
BILIRUBIN,TOTAL: 0.2 MG/DL (ref 0.2–1.3)
BUN SERPL-MCNC: 17 MG/DL (ref 7–17)
CALCIUM SPEC-MCNC: 9.4 MG/DL (ref 8.4–10.2)
CHLORIDE SPEC-SCNC: 106 MMOL/L (ref 98–107)
CO2 SERPL-SCNC: 27 MMOL/L (ref 22–30)
CREAT BLD-SCNC: 0.7 MG/DL (ref 0.52–1.04)
ESTIMATED GLOMERULAR FILT RATE: 84 ML/MIN (ref 60–?)
GFR (AFRICAN AMERICAN): 102 ML/MIN (ref 60–?)
GLOBULIN SER CALC-MCNC: 2.7 G/DL (ref 1.3–3.2)
GLUCOSE: 92 MG/DL (ref 74–100)
HCT VFR BLD CALC: 44.8 % (ref 37–47)
HGB BLD-MCNC: 14.2 G/DL (ref 12.2–16.2)
MCHC RBC-ENTMCNC: 31.7 G/DL (ref 31.8–35.4)
MCV RBC: 95.4 FL (ref 81–99)
MEAN CORPUSCULAR HEMOGLOBIN: 30.2 PG (ref 27–31.2)
PLATELET # BLD: 369 K/MM3 (ref 142–424)
POTASSIUM: 5.1 MMOL/L (ref 3.5–5.1)
PROT SERPL-MCNC: 6.6 G/DL (ref 6.3–8.2)
RBC # BLD AUTO: 4.69 M/MM3 (ref 4.2–5.4)
SODIUM SPEC-SCNC: 141 MMOL/L (ref 136–145)
WBC # BLD AUTO: 5.8 K/MM3 (ref 4.8–10.8)

## 2021-09-21 PROCEDURE — U0003 INFECTIOUS AGENT DETECTION BY NUCLEIC ACID (DNA OR RNA); SEVERE ACUTE RESPIRATORY SYNDROME CORONAVIRUS 2 (SARS-COV-2) (CORONAVIRUS DISEASE [COVID-19]), AMPLIFIED PROBE TECHNIQUE, MAKING USE OF HIGH THROUGHPUT TECHNOLOGIES AS DESCRIBED BY CMS-2020-01-R: HCPCS

## 2021-09-21 PROCEDURE — C9803 HOPD COVID-19 SPEC COLLECT: HCPCS

## 2021-09-21 PROCEDURE — 80053 COMPREHEN METABOLIC PANEL: CPT

## 2021-09-21 PROCEDURE — 36415 COLL VENOUS BLD VENIPUNCTURE: CPT

## 2021-09-21 PROCEDURE — 99213 OFFICE O/P EST LOW 20 MIN: CPT | Performed by: PHYSICIAN ASSISTANT

## 2021-09-21 PROCEDURE — U0005 INFEC AGEN DETEC AMPLI PROBE: HCPCS

## 2021-09-21 PROCEDURE — 85025 COMPLETE CBC W/AUTO DIFF WBC: CPT

## 2021-09-21 NOTE — PROGRESS NOTES
Connie Lu is a 66 y.o. female  who presents to the office today for a preoperative consultation at the request of surgeon Dr. Mcleod's who plans on performing rode removal from R wrist on September 23. This consultation is requested for the specific conditions prompting preoperative evaluation (i.e. because of potential affect on operative risk): HTN, recent COVID infection. Planned anesthesia: patient unsure- thinks local block . The patient has the following known anesthesia issues: No issues . Patients bleeding risk: no recent abnormal bleeding. Patient does not have objections to receiving blood products if needed.    R wrist injury in July while playing corn hole. Feel back landing with weight on R wrist.   Pt R hand dominant   Broke radius and had to have surgery with santos placement   Upcoming surgery later this week is to have santos removed.     Pt notes she if feeling well after COVID infection last month. Denies any breathing concerns     The following portions of the patient's history were reviewed and updated as appropriate: allergies, current medications, past family history, past medical history, past social history, past surgical history and problem list.    Review of Systems  Review of Systems   Constitutional: Negative.  Negative for chills, diaphoresis, fatigue and fever.   HENT: Negative.  Negative for congestion, ear discharge, ear pain, hearing loss, nosebleeds, postnasal drip, sinus pressure, sneezing and sore throat.    Eyes: Negative.    Respiratory: Negative.  Negative for cough, chest tightness, shortness of breath and wheezing.    Cardiovascular: Negative.  Negative for chest pain, palpitations and leg swelling.   Gastrointestinal: Negative for abdominal distention, abdominal pain, blood in stool, constipation, diarrhea, nausea and vomiting.   Genitourinary: Negative.  Negative for difficulty urinating, dysuria, flank pain, frequency, hematuria and urgency.   Musculoskeletal:  Positive for arthralgias and joint swelling. Negative for back pain, gait problem, myalgias, neck pain and neck stiffness.   Skin: Negative.  Negative for color change, pallor, rash and wound.   Neurological: Negative for dizziness, syncope, weakness, light-headedness, numbness and headaches.             Physical Exam  Physical Exam  Vitals and nursing note reviewed.   Constitutional:       Appearance: Normal appearance. She is well-developed.   HENT:      Head: Normocephalic and atraumatic.      Right Ear: Tympanic membrane, ear canal and external ear normal.      Left Ear: Tympanic membrane, ear canal and external ear normal.      Nose: Nose normal.      Mouth/Throat:      Pharynx: No oropharyngeal exudate or posterior oropharyngeal erythema.   Eyes:      Conjunctiva/sclera: Conjunctivae normal.   Neck:      Thyroid: No thyromegaly.      Trachea: No tracheal deviation.   Cardiovascular:      Rate and Rhythm: Normal rate and regular rhythm.      Heart sounds: Normal heart sounds. No murmur heard.   No friction rub. No gallop.    Pulmonary:      Effort: Pulmonary effort is normal. No respiratory distress.      Breath sounds: Normal breath sounds. No wheezing or rales.   Chest:      Chest wall: No tenderness.   Abdominal:      General: Bowel sounds are normal. There is no distension.      Palpations: Abdomen is soft. There is no mass.      Tenderness: There is no abdominal tenderness. There is no guarding or rebound.      Hernia: No hernia is present.   Musculoskeletal:         General: No tenderness or deformity.      Right wrist: Swelling and bony tenderness present. Decreased range of motion.      Cervical back: Normal range of motion and neck supple.   Lymphadenopathy:      Cervical: No cervical adenopathy.   Skin:     General: Skin is warm and dry.   Neurological:      Mental Status: She is alert and oriented to person, place, and time.   Psychiatric:         Mood and Affect: Mood normal.         Behavior:  Behavior normal.         Thought Content: Thought content normal.         Judgment: Judgment normal.         Lab Review   Recent labs ordered by surgeon's office and completed at hospital         Connie Lu is a 66 y.o. female with planned surgery as above.    Known risk factors for perioperative complications: None    Difficulty with intubation is not anticipated.      Current medications which may produce withdrawal symptoms if withheld perioperatively: N/A    1. Preoperative workup as follows hemoglobin, hematocrit, electrolytes, creatinine, glucose, liver function studies.  2. Change in medication regimen before surgery: none, continue medication regimen including morning of surgery, with sip of water.  3. Prophylaxis for cardiac events with perioperative beta-blockers: not indicated.  4. Invasive hemodynamic monitoring perioperatively: not indicated.  5. Deep vein thrombosis prophylaxis postoperatively:regimen to be chosen by surgical team.  6. Surveillance for postoperative MI with ECG immediately postoperatively and on postoperative days 1 and 2 AND troponin levels 24 hours postoperatively and on day 4 or hospital discharge (whichever comes first): not indicated.    Discussion     Pt cleared for surgery as scheduled.     Arpit Mcclure PA-C

## 2021-09-23 ENCOUNTER — HOSPITAL ENCOUNTER (OUTPATIENT)
Dept: HOSPITAL 22 - OR | Age: 66
Discharge: HOME | End: 2021-09-23
Payer: COMMERCIAL

## 2021-09-23 VITALS
SYSTOLIC BLOOD PRESSURE: 123 MMHG | TEMPERATURE: 98.24 F | DIASTOLIC BLOOD PRESSURE: 91 MMHG | OXYGEN SATURATION: 96 % | RESPIRATION RATE: 18 BRPM | HEART RATE: 88 BPM

## 2021-09-23 VITALS
OXYGEN SATURATION: 99 % | HEART RATE: 78 BPM | DIASTOLIC BLOOD PRESSURE: 63 MMHG | SYSTOLIC BLOOD PRESSURE: 139 MMHG | TEMPERATURE: 98.3 F | RESPIRATION RATE: 16 BRPM

## 2021-09-23 VITALS
RESPIRATION RATE: 18 BRPM | DIASTOLIC BLOOD PRESSURE: 85 MMHG | HEART RATE: 78 BPM | TEMPERATURE: 97.16 F | OXYGEN SATURATION: 99 % | SYSTOLIC BLOOD PRESSURE: 110 MMHG

## 2021-09-23 VITALS
SYSTOLIC BLOOD PRESSURE: 123 MMHG | OXYGEN SATURATION: 96 % | HEART RATE: 91 BPM | RESPIRATION RATE: 16 BRPM | DIASTOLIC BLOOD PRESSURE: 91 MMHG | TEMPERATURE: 98.3 F

## 2021-09-23 VITALS
TEMPERATURE: 98.3 F | SYSTOLIC BLOOD PRESSURE: 136 MMHG | DIASTOLIC BLOOD PRESSURE: 75 MMHG | OXYGEN SATURATION: 100 % | RESPIRATION RATE: 16 BRPM | HEART RATE: 76 BPM

## 2021-09-23 VITALS
SYSTOLIC BLOOD PRESSURE: 136 MMHG | DIASTOLIC BLOOD PRESSURE: 72 MMHG | TEMPERATURE: 98.24 F | RESPIRATION RATE: 16 BRPM | OXYGEN SATURATION: 100 % | HEART RATE: 90 BPM

## 2021-09-23 VITALS
RESPIRATION RATE: 16 BRPM | HEART RATE: 84 BPM | DIASTOLIC BLOOD PRESSURE: 75 MMHG | SYSTOLIC BLOOD PRESSURE: 148 MMHG | OXYGEN SATURATION: 96 %

## 2021-09-23 VITALS
DIASTOLIC BLOOD PRESSURE: 66 MMHG | RESPIRATION RATE: 16 BRPM | OXYGEN SATURATION: 95 % | SYSTOLIC BLOOD PRESSURE: 144 MMHG | HEART RATE: 90 BPM

## 2021-09-23 VITALS
RESPIRATION RATE: 16 BRPM | DIASTOLIC BLOOD PRESSURE: 70 MMHG | TEMPERATURE: 98.3 F | SYSTOLIC BLOOD PRESSURE: 141 MMHG | OXYGEN SATURATION: 98 % | HEART RATE: 82 BPM

## 2021-09-23 VITALS
TEMPERATURE: 98.3 F | SYSTOLIC BLOOD PRESSURE: 141 MMHG | HEART RATE: 87 BPM | RESPIRATION RATE: 16 BRPM | DIASTOLIC BLOOD PRESSURE: 78 MMHG | OXYGEN SATURATION: 98 %

## 2021-09-23 VITALS
RESPIRATION RATE: 16 BRPM | TEMPERATURE: 98.2 F | DIASTOLIC BLOOD PRESSURE: 75 MMHG | SYSTOLIC BLOOD PRESSURE: 138 MMHG | HEART RATE: 90 BPM | OXYGEN SATURATION: 95 %

## 2021-09-23 VITALS
HEART RATE: 88 BPM | DIASTOLIC BLOOD PRESSURE: 82 MMHG | OXYGEN SATURATION: 95 % | SYSTOLIC BLOOD PRESSURE: 146 MMHG | RESPIRATION RATE: 16 BRPM

## 2021-09-23 VITALS — RESPIRATION RATE: 16 BRPM

## 2021-09-23 VITALS
HEART RATE: 74 BPM | OXYGEN SATURATION: 96 % | RESPIRATION RATE: 16 BRPM | SYSTOLIC BLOOD PRESSURE: 138 MMHG | DIASTOLIC BLOOD PRESSURE: 78 MMHG

## 2021-09-23 VITALS — BODY MASS INDEX: 38.5 KG/M2

## 2021-09-23 DIAGNOSIS — S52.571D: ICD-10-CM

## 2021-09-23 DIAGNOSIS — T84.84XA: Primary | ICD-10-CM

## 2021-09-23 DIAGNOSIS — G89.28: ICD-10-CM
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
## 2021-09-23 PROCEDURE — 96374 THER/PROPH/DIAG INJ IV PUSH: CPT

## 2021-09-23 PROCEDURE — 73100 X-RAY EXAM OF WRIST: CPT

## 2021-09-23 PROCEDURE — 20680 REMOVAL OF IMPLANT DEEP: CPT

## 2021-09-24 ENCOUNTER — TELEPHONE (OUTPATIENT)
Dept: FAMILY MEDICINE CLINIC | Facility: CLINIC | Age: 66
End: 2021-09-24

## 2021-09-24 NOTE — TELEPHONE ENCOUNTER
Please call.  I would think if it has been past the 14 days since showing positive initially and not having any symptoms, she should be okay but would definitely wear a mask and social distance from people.

## 2021-09-24 NOTE — TELEPHONE ENCOUNTER
Caller: Connie Lu    Relationship: Self    Best call back number: [phone]     What is the best time to reach you: ANYTIME     Who are you requesting to speak with (clinical staff, provider,  specific staff member): CLINICAL STAFF     What was the call regarding: PATIENT STATES SHE HAD SURGERY TO REMOVE HARDWARE FROM RIGHT WRIST AND BEFORE TEST SHE SHOWED POSITIVE FOR COVID HOWEVER SHE WAS TOLD SHE COULD SHOW POSITIVE FOR 90 DAYS BUT WOULD LIKE TO MAKES SURE SHE IS NOT CONTAGIOUS AND IF SHE SHOULD BE TAKING ANY PRECAUTIONS. PATIENT STATES SHE IS NOT SYMPTOMATIC.     Do you require a callback: YES

## 2021-09-26 VITALS — SYSTOLIC BLOOD PRESSURE: 136 MMHG | DIASTOLIC BLOOD PRESSURE: 72 MMHG | TEMPERATURE: 98.24 F | HEART RATE: 90 BPM

## 2021-10-13 DIAGNOSIS — F41.9 ANXIETY: ICD-10-CM

## 2021-10-13 RX ORDER — VENLAFAXINE HYDROCHLORIDE 150 MG/1
CAPSULE, EXTENDED RELEASE ORAL
Qty: 30 CAPSULE | Refills: 2 | Status: SHIPPED | OUTPATIENT
Start: 2021-10-13 | End: 2021-10-13

## 2021-10-13 RX ORDER — VENLAFAXINE HYDROCHLORIDE 150 MG/1
CAPSULE, EXTENDED RELEASE ORAL
Qty: 30 CAPSULE | Refills: 1 | Status: SHIPPED | OUTPATIENT
Start: 2021-10-13 | End: 2022-05-04

## 2021-10-14 DIAGNOSIS — I10 ESSENTIAL HYPERTENSION: ICD-10-CM

## 2021-10-14 DIAGNOSIS — E61.1 IRON DEFICIENCY: ICD-10-CM

## 2021-10-14 DIAGNOSIS — E03.9 ACQUIRED HYPOTHYROIDISM: ICD-10-CM

## 2021-10-14 RX ORDER — LORATADINE 10 MG/1
TABLET ORAL
Qty: 90 TABLET | Refills: 0 | Status: SHIPPED | OUTPATIENT
Start: 2021-10-14 | End: 2022-04-08

## 2021-10-15 RX ORDER — LOSARTAN POTASSIUM 100 MG/1
TABLET ORAL
Qty: 30 TABLET | Refills: 1 | Status: SHIPPED | OUTPATIENT
Start: 2021-10-15 | End: 2021-12-13

## 2021-10-15 RX ORDER — LEVOTHYROXINE SODIUM 112 UG/1
TABLET ORAL
Qty: 30 TABLET | Refills: 1 | Status: SHIPPED | OUTPATIENT
Start: 2021-10-15 | End: 2021-12-13

## 2021-10-15 RX ORDER — FERROUS SULFATE 325(65) MG
TABLET ORAL
Qty: 30 TABLET | Refills: 1 | Status: SHIPPED | OUTPATIENT
Start: 2021-10-15 | End: 2021-12-13

## 2021-10-15 RX ORDER — OMEPRAZOLE 20 MG/1
CAPSULE, DELAYED RELEASE ORAL
Qty: 30 CAPSULE | Refills: 1 | Status: SHIPPED | OUTPATIENT
Start: 2021-10-15 | End: 2021-12-13

## 2021-10-19 ENCOUNTER — HOSPITAL ENCOUNTER (OUTPATIENT)
Age: 66
End: 2021-10-19
Payer: COMMERCIAL

## 2021-10-19 DIAGNOSIS — Z09: ICD-10-CM

## 2021-10-19 DIAGNOSIS — S52.571D: Primary | ICD-10-CM

## 2021-10-19 PROCEDURE — 73110 X-RAY EXAM OF WRIST: CPT

## 2021-11-12 DIAGNOSIS — F51.01 PRIMARY INSOMNIA: ICD-10-CM

## 2021-11-12 RX ORDER — TRAZODONE HYDROCHLORIDE 50 MG/1
TABLET ORAL
Qty: 60 TABLET | Refills: 1 | Status: SHIPPED | OUTPATIENT
Start: 2021-11-12 | End: 2022-01-07

## 2021-12-11 DIAGNOSIS — E03.9 ACQUIRED HYPOTHYROIDISM: ICD-10-CM

## 2021-12-11 DIAGNOSIS — E61.1 IRON DEFICIENCY: ICD-10-CM

## 2021-12-11 DIAGNOSIS — I10 ESSENTIAL HYPERTENSION: ICD-10-CM

## 2021-12-13 RX ORDER — LOSARTAN POTASSIUM 100 MG/1
TABLET ORAL
Qty: 30 TABLET | Refills: 1 | Status: SHIPPED | OUTPATIENT
Start: 2021-12-13 | End: 2022-02-08

## 2021-12-13 RX ORDER — OMEPRAZOLE 20 MG/1
CAPSULE, DELAYED RELEASE ORAL
Qty: 30 CAPSULE | Refills: 1 | Status: SHIPPED | OUTPATIENT
Start: 2021-12-13 | End: 2022-02-08

## 2021-12-13 RX ORDER — LEVOTHYROXINE SODIUM 112 UG/1
TABLET ORAL
Qty: 30 TABLET | Refills: 1 | Status: SHIPPED | OUTPATIENT
Start: 2021-12-13 | End: 2022-02-08

## 2021-12-13 RX ORDER — FERROUS SULFATE 325(65) MG
TABLET ORAL
Qty: 30 TABLET | Refills: 1 | Status: SHIPPED | OUTPATIENT
Start: 2021-12-13 | End: 2022-02-08

## 2022-01-07 DIAGNOSIS — F51.01 PRIMARY INSOMNIA: ICD-10-CM

## 2022-01-07 RX ORDER — TRAZODONE HYDROCHLORIDE 50 MG/1
TABLET ORAL
Qty: 60 TABLET | Refills: 1 | Status: SHIPPED | OUTPATIENT
Start: 2022-01-07 | End: 2022-07-14

## 2022-02-08 DIAGNOSIS — E03.9 ACQUIRED HYPOTHYROIDISM: ICD-10-CM

## 2022-02-08 DIAGNOSIS — I10 ESSENTIAL HYPERTENSION: ICD-10-CM

## 2022-02-08 DIAGNOSIS — E61.1 IRON DEFICIENCY: ICD-10-CM

## 2022-02-08 RX ORDER — OMEPRAZOLE 20 MG/1
CAPSULE, DELAYED RELEASE ORAL
Qty: 30 CAPSULE | Refills: 1 | Status: SHIPPED | OUTPATIENT
Start: 2022-02-08 | End: 2022-04-06

## 2022-02-08 RX ORDER — LOSARTAN POTASSIUM 100 MG/1
TABLET ORAL
Qty: 30 TABLET | Refills: 1 | Status: SHIPPED | OUTPATIENT
Start: 2022-02-08 | End: 2022-04-06

## 2022-02-08 RX ORDER — LEVOTHYROXINE SODIUM 112 UG/1
TABLET ORAL
Qty: 30 TABLET | Refills: 1 | Status: SHIPPED | OUTPATIENT
Start: 2022-02-08 | End: 2022-04-06

## 2022-02-08 RX ORDER — FERROUS SULFATE 325(65) MG
TABLET ORAL
Qty: 30 TABLET | Refills: 1 | Status: SHIPPED | OUTPATIENT
Start: 2022-02-08 | End: 2022-04-06

## 2022-04-06 DIAGNOSIS — E61.1 IRON DEFICIENCY: ICD-10-CM

## 2022-04-06 DIAGNOSIS — I10 ESSENTIAL HYPERTENSION: ICD-10-CM

## 2022-04-06 DIAGNOSIS — E03.9 ACQUIRED HYPOTHYROIDISM: ICD-10-CM

## 2022-04-06 RX ORDER — LOSARTAN POTASSIUM 100 MG/1
TABLET ORAL
Qty: 30 TABLET | Refills: 0 | Status: SHIPPED | OUTPATIENT
Start: 2022-04-06 | End: 2022-05-04

## 2022-04-06 RX ORDER — LEVOTHYROXINE SODIUM 112 UG/1
TABLET ORAL
Qty: 30 TABLET | Refills: 0 | Status: SHIPPED | OUTPATIENT
Start: 2022-04-06 | End: 2022-04-14

## 2022-04-06 RX ORDER — OMEPRAZOLE 20 MG/1
CAPSULE, DELAYED RELEASE ORAL
Qty: 30 CAPSULE | Refills: 0 | Status: SHIPPED | OUTPATIENT
Start: 2022-04-06 | End: 2022-05-04

## 2022-04-06 RX ORDER — FERROUS SULFATE 325(65) MG
TABLET ORAL
Qty: 30 TABLET | Refills: 0 | Status: SHIPPED | OUTPATIENT
Start: 2022-04-06 | End: 2022-05-04

## 2022-04-08 ENCOUNTER — OFFICE VISIT (OUTPATIENT)
Dept: FAMILY MEDICINE CLINIC | Facility: CLINIC | Age: 67
End: 2022-04-08

## 2022-04-08 VITALS
TEMPERATURE: 97.3 F | HEART RATE: 73 BPM | DIASTOLIC BLOOD PRESSURE: 82 MMHG | OXYGEN SATURATION: 99 % | HEIGHT: 60 IN | WEIGHT: 193.2 LBS | SYSTOLIC BLOOD PRESSURE: 128 MMHG | RESPIRATION RATE: 18 BRPM | BODY MASS INDEX: 37.93 KG/M2

## 2022-04-08 DIAGNOSIS — R73.9 HYPERGLYCEMIA: ICD-10-CM

## 2022-04-08 DIAGNOSIS — E03.9 ACQUIRED HYPOTHYROIDISM: ICD-10-CM

## 2022-04-08 DIAGNOSIS — E61.1 IRON DEFICIENCY: ICD-10-CM

## 2022-04-08 DIAGNOSIS — Z13.6 ENCOUNTER FOR LIPID SCREENING FOR CARDIOVASCULAR DISEASE: ICD-10-CM

## 2022-04-08 DIAGNOSIS — Z13.220 ENCOUNTER FOR LIPID SCREENING FOR CARDIOVASCULAR DISEASE: ICD-10-CM

## 2022-04-08 DIAGNOSIS — Z85.828 HISTORY OF SKIN CANCER: ICD-10-CM

## 2022-04-08 DIAGNOSIS — L98.9 SKIN LESIONS: Primary | ICD-10-CM

## 2022-04-08 PROCEDURE — 99214 OFFICE O/P EST MOD 30 MIN: CPT | Performed by: FAMILY MEDICINE

## 2022-04-08 NOTE — PROGRESS NOTES
" Chief Complaint  referral (Skin lesions nose and rt shoulder) and Skin Lesion    Subjective          Connie Lu presents to [facility]  History of Present Illness    The patient consents to being recorded using NIK.    Skin lesions  She has a skin lesion present on her nose and right shoulder that has been there for approximately 1 year. She reports tenderness to the lesion on the nose. She has had skin cancer on the tip of her nose in the past that was removed 5 to 6 years ago by [facility]. She was referred to a dermatologist, Dr. Bolivar Fernández, last year but never scheduled an appointment. She is requesting a new referral for dermatology.      She has been struggling because her mother who is 83 and is now bedridden. The patient is no longer receiving home care assistance due to financial issues and do not receive help from family. She and her mother are discussing nursing home placement as caring for her mother is mentally and physically draining.     Diabetes  Her past A1c showed a result of 6.1 that decreased to 5.6 in 07/2021.     Hypothyroidism  She is currently compliant with her thyroid medication.       Review of Systems   Constitutional: Positive for appetite change.   HENT: Negative.    Respiratory: Negative.  Negative for shortness of breath.    Cardiovascular: Positive for chest pain (may get a shooting pain ).   Gastrointestinal: Positive for diarrhea.        Cramping in am   Genitourinary: Negative.    Psychiatric/Behavioral: Positive for stress.        Objective       Vital Signs:   /82   Pulse 73   Temp 97.3 °F (36.3 °C)   Resp 18   Ht 151.1 cm (59.5\")   Wt 87.6 kg (193 lb 3.2 oz)   SpO2 99%   BMI 38.37 kg/m²     Physical Exam  Vitals and nursing note reviewed.   Constitutional:       General: She is not in acute distress.     Appearance: She is well-developed. She is not ill-appearing.   HENT:      Head: Normocephalic and " atraumatic.      Right Ear: Hearing, tympanic membrane, ear canal and external ear normal.      Left Ear: Hearing, tympanic membrane, ear canal and external ear normal.      Nose: Nose normal. No congestion or rhinorrhea.      Mouth/Throat:      Mouth: Mucous membranes are moist.      Pharynx: No oropharyngeal exudate or posterior oropharyngeal erythema.   Eyes:      General:         Right eye: No discharge.         Left eye: No discharge.      Conjunctiva/sclera: Conjunctivae normal.      Pupils: Pupils are equal, round, and reactive to light.   Neck:      Thyroid: No thyromegaly.   Cardiovascular:      Rate and Rhythm: Normal rate and regular rhythm.      Heart sounds: Normal heart sounds. No murmur heard.    No friction rub. No gallop.   Pulmonary:      Effort: Pulmonary effort is normal. No respiratory distress.      Breath sounds: Normal breath sounds. No wheezing or rales.   Abdominal:      General: Bowel sounds are normal. There is no distension.      Palpations: Abdomen is soft. There is no mass.      Tenderness: There is no abdominal tenderness. There is no guarding or rebound.   Musculoskeletal:      Right lower leg: No edema.      Left lower leg: No edema.   Lymphadenopathy:      Cervical: No cervical adenopathy.   Skin:     General: Skin is warm and dry.      Coloration: Skin is not jaundiced or pale.   Neurological:      General: No focal deficit present.      Mental Status: She is alert.   Psychiatric:         Mood and Affect: Mood normal.         Behavior: Behavior normal.        Right shoulder 3 to 4 mm scaly lesion  Left bridge of nose 3 to 4 mm slightly raised lesion with raised edges possible basal cell        Result Review :                     Assessment and Plan    Diagnoses and all orders for this visit:    1. Skin lesions (Primary)  -     Ambulatory Referral to Dermatology    2. History of skin cancer  -     Ambulatory Referral to Dermatology    3. Hyperglycemia  -     Comprehensive Metabolic  Panel  -     Hemoglobin A1c    4. Acquired hypothyroidism  -     TSH    5. Encounter for lipid screening for cardiovascular disease  -     Comprehensive Metabolic Panel  -     Lipid Panel With / Chol / HDL Ratio    6. Iron deficiency  -     CBC & Differential    1. Skin lesions of the left side of the nose and right shoulder  - History of skin cancer. I recommend referral to dermatology; she is in agreement so we will proceed with that today.     2. Hyperglycemia  - I will check CMP and A1c.    3. Hypothyroidism  - I will check TSH.    4. Hyperlipidemia  - I will check lipids for screening.    5. History of iron deficiency  - We will recheck CBC and add iron if anemic.          DISCUSSION         Follow Up   Return for follow up depends on review of labs and testing.    Patient was given instructions and counseling regarding her condition or for health maintenance advice. Please see specific information pulled into the AVS if appropriate.       Masoud Martin MD     Transcribed from ambient dictation for Masoud Martin MD by Shelby Higuera.   04/08/22   16:20 EDT    Patient verbalized consent to the visit recording.  I have personally performed the services described in this document as transcribed by the above individual, and it is both accurate and complete.  Masoud Martin MD  4/8/2022  19:07 EDT

## 2022-04-09 LAB
ALBUMIN SERPL-MCNC: 4 G/DL (ref 3.8–4.8)
ALBUMIN/GLOB SERPL: 1.5 {RATIO} (ref 1.2–2.2)
ALP SERPL-CCNC: 99 IU/L (ref 44–121)
ALT SERPL-CCNC: 18 IU/L (ref 0–32)
AST SERPL-CCNC: 25 IU/L (ref 0–40)
BASOPHILS # BLD AUTO: 0 X10E3/UL (ref 0–0.2)
BASOPHILS NFR BLD AUTO: 1 %
BILIRUB SERPL-MCNC: <0.2 MG/DL (ref 0–1.2)
BUN SERPL-MCNC: 23 MG/DL (ref 8–27)
BUN/CREAT SERPL: 33 (ref 12–28)
CALCIUM SERPL-MCNC: 9.1 MG/DL (ref 8.7–10.3)
CHLORIDE SERPL-SCNC: 104 MMOL/L (ref 96–106)
CHOLEST SERPL-MCNC: 215 MG/DL (ref 100–199)
CHOLEST/HDLC SERPL: 3.3 RATIO (ref 0–4.4)
CO2 SERPL-SCNC: 22 MMOL/L (ref 20–29)
CREAT SERPL-MCNC: 0.69 MG/DL (ref 0.57–1)
EGFRCR SERPLBLD CKD-EPI 2021: 97 ML/MIN/1.73
EOSINOPHIL # BLD AUTO: 0.2 X10E3/UL (ref 0–0.4)
EOSINOPHIL NFR BLD AUTO: 3 %
ERYTHROCYTE [DISTWIDTH] IN BLOOD BY AUTOMATED COUNT: 13.5 % (ref 11.7–15.4)
GLOBULIN SER CALC-MCNC: 2.6 G/DL (ref 1.5–4.5)
GLUCOSE SERPL-MCNC: 81 MG/DL (ref 65–99)
HBA1C MFR BLD: 5.7 % (ref 4.8–5.6)
HCT VFR BLD AUTO: 44.2 % (ref 34–46.6)
HDLC SERPL-MCNC: 66 MG/DL
HGB BLD-MCNC: 14.7 G/DL (ref 11.1–15.9)
IMM GRANULOCYTES # BLD AUTO: 0 X10E3/UL (ref 0–0.1)
IMM GRANULOCYTES NFR BLD AUTO: 0 %
LDLC SERPL CALC-MCNC: 105 MG/DL (ref 0–99)
LYMPHOCYTES # BLD AUTO: 2.2 X10E3/UL (ref 0.7–3.1)
LYMPHOCYTES NFR BLD AUTO: 29 %
MCH RBC QN AUTO: 30.9 PG (ref 26.6–33)
MCHC RBC AUTO-ENTMCNC: 33.3 G/DL (ref 31.5–35.7)
MCV RBC AUTO: 93 FL (ref 79–97)
MONOCYTES # BLD AUTO: 0.7 X10E3/UL (ref 0.1–0.9)
MONOCYTES NFR BLD AUTO: 9 %
NEUTROPHILS # BLD AUTO: 4.4 X10E3/UL (ref 1.4–7)
NEUTROPHILS NFR BLD AUTO: 58 %
PLATELET # BLD AUTO: 344 X10E3/UL (ref 150–450)
POTASSIUM SERPL-SCNC: 4.5 MMOL/L (ref 3.5–5.2)
PROT SERPL-MCNC: 6.6 G/DL (ref 6–8.5)
RBC # BLD AUTO: 4.75 X10E6/UL (ref 3.77–5.28)
SODIUM SERPL-SCNC: 144 MMOL/L (ref 134–144)
TRIGL SERPL-MCNC: 258 MG/DL (ref 0–149)
TSH SERPL DL<=0.005 MIU/L-ACNC: 7.94 UIU/ML (ref 0.45–4.5)
VLDLC SERPL CALC-MCNC: 44 MG/DL (ref 5–40)
WBC # BLD AUTO: 7.5 X10E3/UL (ref 3.4–10.8)

## 2022-04-14 DIAGNOSIS — E03.9 ACQUIRED HYPOTHYROIDISM: ICD-10-CM

## 2022-04-14 RX ORDER — LEVOTHYROXINE SODIUM 0.12 MG/1
125 TABLET ORAL DAILY
Qty: 30 TABLET | Refills: 3 | Status: SHIPPED | OUTPATIENT
Start: 2022-04-14 | End: 2022-08-05

## 2022-04-25 ENCOUNTER — TELEPHONE (OUTPATIENT)
Dept: FAMILY MEDICINE CLINIC | Facility: CLINIC | Age: 67
End: 2022-04-25

## 2022-04-25 DIAGNOSIS — L98.9 SKIN LESIONS: Primary | ICD-10-CM

## 2022-04-25 NOTE — TELEPHONE ENCOUNTER
PT WANTED TO KNOW IF SHE COULD BE REFERRED TO [facility]. SHE ISN'T ABLE TO GET INTO DR. JOLLY'S OFFICE FOR 2 MONTHS SHE SAID. ADVISED THAT DR. ALVES WILL NOT NOT BE IN TILL NEXT WEEK. SHE WANTED TO KNOW IF SOMEONE ELSE COULD PLACE THE REFERRAL FOR HER.

## 2022-04-26 NOTE — TELEPHONE ENCOUNTER
Called patient, [facility] does not accept her insurance. States she wants to stay with Dr. Fernnádez and she'll wait.

## 2022-05-03 DIAGNOSIS — F41.9 ANXIETY: ICD-10-CM

## 2022-05-03 NOTE — TELEPHONE ENCOUNTER
Please call patient.  We received a refill request for Effexor but we have not refilled that since last fall.  Please confirm if she has been taking this on a regular basis.

## 2022-05-04 DIAGNOSIS — I10 ESSENTIAL HYPERTENSION: ICD-10-CM

## 2022-05-04 DIAGNOSIS — E61.1 IRON DEFICIENCY: ICD-10-CM

## 2022-05-04 RX ORDER — VENLAFAXINE HYDROCHLORIDE 150 MG/1
CAPSULE, EXTENDED RELEASE ORAL
Qty: 30 CAPSULE | Refills: 2 | Status: SHIPPED | OUTPATIENT
Start: 2022-05-04 | End: 2022-07-29

## 2022-05-04 RX ORDER — LEVOTHYROXINE SODIUM 112 UG/1
TABLET ORAL
Qty: 30 TABLET | Refills: 2 | Status: SHIPPED | OUTPATIENT
Start: 2022-05-04 | End: 2022-05-09

## 2022-05-04 RX ORDER — OMEPRAZOLE 20 MG/1
CAPSULE, DELAYED RELEASE ORAL
Qty: 30 CAPSULE | Refills: 2 | Status: SHIPPED | OUTPATIENT
Start: 2022-05-04 | End: 2022-07-29

## 2022-05-04 RX ORDER — FERROUS SULFATE 325(65) MG
TABLET ORAL
Qty: 30 TABLET | Refills: 2 | Status: SHIPPED | OUTPATIENT
Start: 2022-05-04 | End: 2022-08-17 | Stop reason: SDUPTHER

## 2022-05-04 RX ORDER — LOSARTAN POTASSIUM 100 MG/1
TABLET ORAL
Qty: 30 TABLET | Refills: 2 | Status: SHIPPED | OUTPATIENT
Start: 2022-05-04 | End: 2022-07-29

## 2022-05-09 ENCOUNTER — TELEPHONE (OUTPATIENT)
Dept: FAMILY MEDICINE CLINIC | Facility: CLINIC | Age: 67
End: 2022-05-09

## 2022-05-09 DIAGNOSIS — E03.9 ACQUIRED HYPOTHYROIDISM: ICD-10-CM

## 2022-05-09 DIAGNOSIS — M79.641 PAIN IN BOTH HANDS: Primary | ICD-10-CM

## 2022-05-09 DIAGNOSIS — M79.642 PAIN IN BOTH HANDS: Primary | ICD-10-CM

## 2022-05-09 DIAGNOSIS — F51.01 PRIMARY INSOMNIA: ICD-10-CM

## 2022-05-09 RX ORDER — TRAZODONE HYDROCHLORIDE 50 MG/1
50-100 TABLET ORAL NIGHTLY
Qty: 180 TABLET | Refills: 1 | Status: CANCELLED | OUTPATIENT
Start: 2022-05-09

## 2022-05-09 RX ORDER — LEVOTHYROXINE SODIUM 0.12 MG/1
125 TABLET ORAL DAILY
Qty: 90 TABLET | Refills: 1 | Status: CANCELLED | OUTPATIENT
Start: 2022-05-09

## 2022-05-09 NOTE — TELEPHONE ENCOUNTER
Please call.  We got a refill request from [facility] for the trazodone and the levothyroxine.  Please confirm that she wants these to go to [facility].  In addition, looks like the lower dose was sent in last week but it should be there 125 mcg of the levothyroxine not 112 mcg.    Let me know.

## 2022-05-11 NOTE — TELEPHONE ENCOUNTER
Called patient stated she would like to wait for the mail order. Stated she is confused with it all and stated she thought it was a different one. So she don't want anything sent there yet. Would like to just stick with [facility] for [facility]. Also stated she don't need those meds right now either   Also stated she is having some arthritis pain in both thumbs and in ankles and knees. Stated she had talk about it one when in here. Stated she would like to know what she can take for this. Also would like a referral to the arthritis doctor.

## 2022-05-12 ENCOUNTER — HOSPITAL ENCOUNTER (OUTPATIENT)
Dept: HOSPITAL 22 - OT | Age: 67
Discharge: HOME | End: 2022-05-12
Payer: COMMERCIAL

## 2022-05-12 DIAGNOSIS — M79.644: ICD-10-CM

## 2022-05-12 DIAGNOSIS — S66.201A: Primary | ICD-10-CM

## 2022-05-12 PROCEDURE — 97165 OT EVAL LOW COMPLEX 30 MIN: CPT

## 2022-05-12 PROCEDURE — 97763 ORTHC/PROSTC MGMT SBSQ ENC: CPT

## 2022-05-12 PROCEDURE — 97140 MANUAL THERAPY 1/> REGIONS: CPT

## 2022-05-12 PROCEDURE — 97035 APP MDLTY 1+ULTRASOUND EA 15: CPT

## 2022-05-12 PROCEDURE — 97010 HOT OR COLD PACKS THERAPY: CPT

## 2022-05-12 PROCEDURE — 97530 THERAPEUTIC ACTIVITIES: CPT

## 2022-05-12 PROCEDURE — 97014 ELECTRIC STIMULATION THERAPY: CPT

## 2022-05-12 PROCEDURE — 97760 ORTHOTIC MGMT&TRAING 1ST ENC: CPT

## 2022-05-12 PROCEDURE — 97110 THERAPEUTIC EXERCISES: CPT

## 2022-05-12 PROCEDURE — 97164 PT RE-EVAL EST PLAN CARE: CPT

## 2022-05-12 PROCEDURE — G0283 ELEC STIM OTHER THAN WOUND: HCPCS

## 2022-05-12 NOTE — TELEPHONE ENCOUNTER
Please call.  She can try either Tylenol or ibuprofen for the pain in her hands.  I will place referral to rheumatologist/arthritis doctor.  It can take quite some time to get into see 1.

## 2022-07-14 DIAGNOSIS — F51.01 PRIMARY INSOMNIA: ICD-10-CM

## 2022-07-14 RX ORDER — TRAZODONE HYDROCHLORIDE 50 MG/1
TABLET ORAL
Qty: 60 TABLET | Refills: 0 | Status: SHIPPED | OUTPATIENT
Start: 2022-07-14 | End: 2022-08-11

## 2022-07-29 ENCOUNTER — TELEPHONE (OUTPATIENT)
Dept: FAMILY MEDICINE CLINIC | Facility: CLINIC | Age: 67
End: 2022-07-29

## 2022-07-29 DIAGNOSIS — F41.9 ANXIETY: ICD-10-CM

## 2022-07-29 DIAGNOSIS — I10 ESSENTIAL HYPERTENSION: ICD-10-CM

## 2022-07-29 RX ORDER — VENLAFAXINE HYDROCHLORIDE 150 MG/1
CAPSULE, EXTENDED RELEASE ORAL
Qty: 30 CAPSULE | Refills: 2 | Status: SHIPPED | OUTPATIENT
Start: 2022-07-29 | End: 2022-10-28 | Stop reason: SDUPTHER

## 2022-07-29 RX ORDER — LOSARTAN POTASSIUM 100 MG/1
TABLET ORAL
Qty: 30 TABLET | Refills: 2 | Status: SHIPPED | OUTPATIENT
Start: 2022-07-29 | End: 2022-12-31

## 2022-07-29 RX ORDER — OMEPRAZOLE 20 MG/1
CAPSULE, DELAYED RELEASE ORAL
Qty: 30 CAPSULE | Refills: 2 | Status: SHIPPED | OUTPATIENT
Start: 2022-07-29 | End: 2022-12-21

## 2022-08-05 ENCOUNTER — TELEPHONE (OUTPATIENT)
Dept: FAMILY MEDICINE CLINIC | Facility: CLINIC | Age: 67
End: 2022-08-05

## 2022-08-05 DIAGNOSIS — E03.9 ACQUIRED HYPOTHYROIDISM: ICD-10-CM

## 2022-08-05 RX ORDER — LEVOTHYROXINE SODIUM 0.12 MG/1
TABLET ORAL
Qty: 30 TABLET | Refills: 1 | Status: SHIPPED | OUTPATIENT
Start: 2022-08-05 | End: 2022-10-03

## 2022-08-11 DIAGNOSIS — F51.01 PRIMARY INSOMNIA: ICD-10-CM

## 2022-08-11 RX ORDER — TRAZODONE HYDROCHLORIDE 50 MG/1
TABLET ORAL
Qty: 60 TABLET | Refills: 0 | Status: SHIPPED | OUTPATIENT
Start: 2022-08-11 | End: 2022-08-17 | Stop reason: SDUPTHER

## 2022-08-17 ENCOUNTER — TELEPHONE (OUTPATIENT)
Dept: FAMILY MEDICINE CLINIC | Facility: CLINIC | Age: 67
End: 2022-08-17

## 2022-08-17 DIAGNOSIS — F51.01 PRIMARY INSOMNIA: ICD-10-CM

## 2022-08-17 DIAGNOSIS — E61.1 IRON DEFICIENCY: ICD-10-CM

## 2022-08-17 NOTE — TELEPHONE ENCOUNTER
Caller: Connie Lu    Relationship: Self    Best call back number: [phone]    Requested Prescriptions:   Requested Prescriptions     Pending Prescriptions Disp Refills   • traZODone (DESYREL) 50 MG tablet 60 tablet 0   • ferrous sulfate (FeroSul) 325 (65 FE) MG tablet 30 tablet 2     Sig: Take 1 tablet by mouth Every Morning Before Breakfast.        Pharmacy where request should be sent: [address] [phone] [facility] [phone]      Additional details provided by patient:     Does the patient have less than a 3 day supply:  [x] Yes  [] No    Hanny Reynolds Rep   08/17/22 10:47 EDT

## 2022-08-18 RX ORDER — TRAZODONE HYDROCHLORIDE 50 MG/1
50-100 TABLET ORAL NIGHTLY
Qty: 60 TABLET | Refills: 2 | Status: SHIPPED | OUTPATIENT
Start: 2022-08-18 | End: 2022-10-28 | Stop reason: SDUPTHER

## 2022-08-18 RX ORDER — FERROUS SULFATE 325(65) MG
1 TABLET ORAL
Qty: 30 TABLET | Refills: 2 | Status: SHIPPED | OUTPATIENT
Start: 2022-08-18 | End: 2022-10-28 | Stop reason: SDUPTHER

## 2022-08-26 ENCOUNTER — TELEMEDICINE (OUTPATIENT)
Dept: FAMILY MEDICINE CLINIC | Facility: CLINIC | Age: 67
End: 2022-08-26

## 2022-08-26 ENCOUNTER — TELEPHONE (OUTPATIENT)
Dept: FAMILY MEDICINE CLINIC | Facility: CLINIC | Age: 67
End: 2022-08-26

## 2022-08-26 DIAGNOSIS — J06.9 PROTRACTED URI: ICD-10-CM

## 2022-08-26 DIAGNOSIS — U07.1 COVID-19 VIRUS INFECTION: Primary | ICD-10-CM

## 2022-08-26 PROCEDURE — 99213 OFFICE O/P EST LOW 20 MIN: CPT | Performed by: FAMILY MEDICINE

## 2022-08-26 RX ORDER — AZITHROMYCIN 250 MG/1
TABLET, FILM COATED ORAL
Qty: 6 TABLET | Refills: 0 | Status: SHIPPED | OUTPATIENT
Start: 2022-08-26 | End: 2022-10-28

## 2022-08-26 NOTE — PROGRESS NOTES
This was an audio and video enabled telemedicine encounter.     You have chosen to receive care through a telehealth visit.  Do you consent to use a video/audio connection for your medical care today? Yes     Time spent: 10 min total with patient in discussion    Patient was in their home and I was in my office.          Chief Complaint  Nasal Congestion    Subjective          Connie Lu presents to [facility]  History of Present Illness    Earache  And sinus sx  Glands are swollen  Prod cough in am and PM  Low grade temp  99.8  No temp today    Covid + 8/24/2022    Symptoms started Monday    No chest pain and no shortness of breath  + prod cough + light yellow/white    Covid vaccine none        Review of Systems   Constitutional: Positive for fatigue.   HENT: Positive for congestion.    Respiratory: Positive for cough. Negative for shortness of breath.    Cardiovascular: Negative for chest pain.        Objective       Vital Signs:   There were no vitals taken for this visit.    Physical Exam  Constitutional:       General: She is not in acute distress.     Appearance: She is not toxic-appearing.   HENT:      Head: Normocephalic.   Pulmonary:      Effort: Pulmonary effort is normal.   Neurological:      Mental Status: She is alert.   Psychiatric:         Mood and Affect: Mood normal.         Behavior: Behavior normal.          Result Review :                     Assessment and Plan    Diagnoses and all orders for this visit:    1. COVID-19 virus infection (Primary)  -     Nirmatrelvir&Ritonavir 300/100 (PAXLOVID) 20 x 150 MG & 10 x 100MG tablet therapy pack tablet; Take 3 tablets by mouth 2 (Two) Times a Day for 5 days.  Dispense: 30 tablet; Refill: 0    2. Protracted URI  -     azithromycin (Zithromax) 250 MG tablet; Take 2 tablets the first day, then 1 tablet daily for 4 days.  Dispense: 6 tablet; Refill: 0          DISCUSSION    Discussed options with patient.  Given  that she is 67 years of age and she is unvaccinated, I recommended that she take Paxlovid.  Explained its use and how to take it.  She agrees to start.  This was sent to [facility] pharmacy.    In addition, she feels that she may have a sinus infection with the discolored drainage.  We will go ahead and prescribe a Z-Justin.    She was asked to call or seek urgent treatment if develops chest pain or shortness of breath.    Continue over-the-counter symptomatic relief.      Follow Up   Return if symptoms worsen or fail to improve.    Patient was given instructions and counseling regarding her condition or for health maintenance advice. Please see specific information pulled into the AVS if appropriate.       Masoud Martin MD

## 2022-08-26 NOTE — TELEPHONE ENCOUNTER
Caller: Connie Lu    Relationship to patient: Self    Best call back number: [phone]    Date of positive COVID19 test: 082422    COVID19 symptoms: sinus / ear infection      What is the patients preferred pharmacy: [facility] - [address] [phone] [facility] [phone]

## 2022-08-29 DIAGNOSIS — U07.1 COVID-19 VIRUS INFECTION: Primary | ICD-10-CM

## 2022-08-29 RX ORDER — DEXTROMETHORPHAN HYDROBROMIDE AND PROMETHAZINE HYDROCHLORIDE 15; 6.25 MG/5ML; MG/5ML
5 SYRUP ORAL 4 TIMES DAILY PRN
Qty: 180 ML | Refills: 1 | Status: SHIPPED | OUTPATIENT
Start: 2022-08-29 | End: 2022-12-08

## 2022-10-01 DIAGNOSIS — E03.9 ACQUIRED HYPOTHYROIDISM: ICD-10-CM

## 2022-10-03 RX ORDER — LEVOTHYROXINE SODIUM 0.12 MG/1
TABLET ORAL
Qty: 30 TABLET | Refills: 0 | Status: SHIPPED | OUTPATIENT
Start: 2022-10-03 | End: 2022-11-01

## 2022-10-28 ENCOUNTER — OFFICE VISIT (OUTPATIENT)
Dept: FAMILY MEDICINE CLINIC | Facility: CLINIC | Age: 67
End: 2022-10-28

## 2022-10-28 ENCOUNTER — TRANSCRIBE ORDERS (OUTPATIENT)
Dept: LAB | Facility: HOSPITAL | Age: 67
End: 2022-10-28

## 2022-10-28 ENCOUNTER — TELEPHONE (OUTPATIENT)
Dept: FAMILY MEDICINE CLINIC | Facility: CLINIC | Age: 67
End: 2022-10-28

## 2022-10-28 ENCOUNTER — LAB (OUTPATIENT)
Dept: LAB | Facility: HOSPITAL | Age: 67
End: 2022-10-28

## 2022-10-28 VITALS
WEIGHT: 200 LBS | SYSTOLIC BLOOD PRESSURE: 130 MMHG | OXYGEN SATURATION: 98 % | DIASTOLIC BLOOD PRESSURE: 88 MMHG | TEMPERATURE: 97.3 F | BODY MASS INDEX: 40.32 KG/M2 | HEIGHT: 59 IN | RESPIRATION RATE: 18 BRPM | HEART RATE: 79 BPM

## 2022-10-28 DIAGNOSIS — E61.1 IRON DEFICIENCY: ICD-10-CM

## 2022-10-28 DIAGNOSIS — F51.01 PRIMARY INSOMNIA: ICD-10-CM

## 2022-10-28 DIAGNOSIS — E03.9 ACQUIRED HYPOTHYROIDISM: Primary | ICD-10-CM

## 2022-10-28 DIAGNOSIS — R45.89 DEPRESSED MOOD: ICD-10-CM

## 2022-10-28 DIAGNOSIS — I10 ESSENTIAL HYPERTENSION: ICD-10-CM

## 2022-10-28 DIAGNOSIS — M13.0 POLYARTHROPATHY: Primary | ICD-10-CM

## 2022-10-28 DIAGNOSIS — M13.0 POLYARTHROPATHY: ICD-10-CM

## 2022-10-28 DIAGNOSIS — F41.9 ANXIETY: ICD-10-CM

## 2022-10-28 PROCEDURE — 86140 C-REACTIVE PROTEIN: CPT

## 2022-10-28 PROCEDURE — 99214 OFFICE O/P EST MOD 30 MIN: CPT | Performed by: FAMILY MEDICINE

## 2022-10-28 PROCEDURE — 85652 RBC SED RATE AUTOMATED: CPT

## 2022-10-28 PROCEDURE — 82550 ASSAY OF CK (CPK): CPT

## 2022-10-28 PROCEDURE — 36415 COLL VENOUS BLD VENIPUNCTURE: CPT

## 2022-10-28 RX ORDER — FERROUS SULFATE 325(65) MG
1 TABLET ORAL
Qty: 30 TABLET | Refills: 5 | Status: SHIPPED | OUTPATIENT
Start: 2022-10-28 | End: 2022-12-08

## 2022-10-28 RX ORDER — VENLAFAXINE HYDROCHLORIDE 150 MG/1
150 CAPSULE, EXTENDED RELEASE ORAL DAILY
Qty: 30 CAPSULE | Refills: 5 | Status: SHIPPED | OUTPATIENT
Start: 2022-10-28 | End: 2022-11-14

## 2022-10-28 RX ORDER — TRAZODONE HYDROCHLORIDE 50 MG/1
50-100 TABLET ORAL NIGHTLY
Qty: 60 TABLET | Refills: 5 | Status: SHIPPED | OUTPATIENT
Start: 2022-10-28 | End: 2022-12-05

## 2022-10-28 NOTE — PROGRESS NOTES
"Chief Complaint  Follow-up (Thyroid) and Joint pain (Hands ,feet, neck,  knee x 6 months)    Subjective          Connie Lu presents to [facility]  History of Present Illness    Iron deficiency  The patient states that she has not been taking her ferrous sulfate. She states that she has not taken it in over 2 months.    Anxiety and depression  She states that she is gaining weight. She states that she is a little bit depressed. She states that she is taking venlafaxine for anxiety and depression. She states that she has been building back up in the last 1 to 2 weeks. She states that she can tear up very easily. She states that she has some family issues and she is eating her feelings. She states that she would like a referral for a therapist.    Insomnia  The patient states that she is still taking 2 of the 50 mg trazodone. She states that sometimes it is not enough and she is making herself not increase it. She states that she does not want to increase it. She states that if she does not have it, she cannot sleep.     Hypothyroidism  The patient states that she has been taking her thyroid medication 125 mcg.    Acid reflux  The patient states that she is taking omeprazole for acid reflux.    Health maintenance  The patient states that she has an appointment with the arthritis center this afternoon at 1:00 PM.  The patient asks about gabapentin. The patient states that she has had her influenza vaccine approximately 1 to 2 weeks ago. She states that she has had 2 shingles vaccines at [facility]. The patient will have lab work done.    Review of Systems   All other systems reviewed and are negative.       Objective       Vital Signs:   /88   Pulse 79   Temp 97.3 °F (36.3 °C)   Resp 18   Ht 149.9 cm (59\")   Wt 90.7 kg (200 lb)   SpO2 98%   BMI 40.40 kg/m²     Physical Exam  Vitals and nursing note reviewed.   Constitutional:       Appearance: She is " well-developed.   HENT:      Head: Normocephalic and atraumatic.      Right Ear: Hearing and external ear normal.      Left Ear: Hearing and external ear normal.   Eyes:      Conjunctiva/sclera: Conjunctivae normal.      Pupils: Pupils are equal, round, and reactive to light.   Cardiovascular:      Rate and Rhythm: Normal rate and regular rhythm.      Heart sounds: Normal heart sounds. No murmur heard.    No friction rub.   Pulmonary:      Effort: Pulmonary effort is normal. No respiratory distress.      Breath sounds: Normal breath sounds. No wheezing or rales.   Musculoskeletal:      Right lower leg: No edema.      Left lower leg: No edema.   Skin:     General: Skin is warm.   Neurological:      Mental Status: She is alert and oriented to person, place, and time.   Psychiatric:         Behavior: Behavior normal.          Result Review :                     Assessment and Plan    Diagnoses and all orders for this visit:    1. Acquired hypothyroidism (Primary)  -     TSH    2. Iron deficiency  -     CBC & Differential  -     Iron and TIBC  -     Ferritin  -     ferrous sulfate (FeroSul) 325 (65 FE) MG tablet; Take 1 tablet by mouth Every Morning Before Breakfast.  Dispense: 30 tablet; Refill: 5    3. Essential hypertension  -     CBC & Differential  -     TSH  -     Comprehensive Metabolic Panel    4. Depressed mood  -     Ambulatory Referral to Behavioral Health    5. Anxiety  -     venlafaxine XR (EFFEXOR-XR) 150 MG 24 hr capsule; Take 1 capsule by mouth Daily.  Dispense: 30 capsule; Refill: 5  -     Ambulatory Referral to Behavioral Health    6. Primary insomnia  -     traZODone (DESYREL) 50 MG tablet; Take 1-2 tablets by mouth Every Night.  Dispense: 60 tablet; Refill: 5          DISCUSSION    Hypothyroidism.  Due for TSH.  She is currently on levothyroxine 125 daily.    Iron deficiency.  Check CBC, iron panel.  She had been on iron sulfate but they pharmacy stopped giving it to her.  She has not taken it for  about 2 months.  We will resend that to the pharmacy and check levels.    Hypertension.  Blood pressure stable on current medications.  Check labs as noted.    Depressed mood with anxiety.  Continue Effexor and refer to [facility].    Insomnia.  Trazodone 1-2 at bedtime.    Generalized arthralgia.  Seeing rheumatoid center today.  If they do not change or alter her medication, consider gabapentin which may help with the pain as well as her sleeping issues.  She expressed understanding.    Mike dated 10/31/2022  was reviewed and appropriate.       Follow Up   Return for follow up depends on review of labs and testing.    Patient was given instructions and counseling regarding her condition or for health maintenance advice. Please see specific information pulled into the AVS if appropriate.       Masoud Martin MD   Transcribed from ambient dictation for Masoud Martin MD by Siomara Giles.  10/28/22   12:37 EDT    Patient or patient representative verbalized consent to the visit recording.  I have personally performed the services described in this document as transcribed by the above individual, and it is both accurate and complete.  Masoud Martin MD  10/31/2022  08:11 EDT

## 2022-10-29 LAB
ALBUMIN SERPL-MCNC: 4.2 G/DL (ref 3.8–4.8)
ALBUMIN/GLOB SERPL: 2.1 {RATIO} (ref 1.2–2.2)
ALP SERPL-CCNC: 95 IU/L (ref 44–121)
ALT SERPL-CCNC: 20 IU/L (ref 0–32)
AST SERPL-CCNC: 24 IU/L (ref 0–40)
BASOPHILS # BLD AUTO: 0 X10E3/UL (ref 0–0.2)
BASOPHILS NFR BLD AUTO: 1 %
BILIRUB SERPL-MCNC: 0.3 MG/DL (ref 0–1.2)
BUN SERPL-MCNC: 19 MG/DL (ref 8–27)
BUN/CREAT SERPL: 31 (ref 12–28)
CALCIUM SERPL-MCNC: 9.4 MG/DL (ref 8.7–10.3)
CHLORIDE SERPL-SCNC: 102 MMOL/L (ref 96–106)
CK SERPL-CCNC: 82 U/L (ref 20–180)
CO2 SERPL-SCNC: 28 MMOL/L (ref 20–29)
CREAT SERPL-MCNC: 0.62 MG/DL (ref 0.57–1)
CRP SERPL-MCNC: 0.62 MG/DL (ref 0–0.5)
EGFRCR SERPLBLD CKD-EPI 2021: 99 ML/MIN/1.73
EOSINOPHIL # BLD AUTO: 0.2 X10E3/UL (ref 0–0.4)
EOSINOPHIL NFR BLD AUTO: 3 %
ERYTHROCYTE [DISTWIDTH] IN BLOOD BY AUTOMATED COUNT: 13.5 % (ref 11.7–15.4)
ERYTHROCYTE [SEDIMENTATION RATE] IN BLOOD: 21 MM/HR (ref 0–30)
FERRITIN SERPL-MCNC: 111 NG/ML (ref 15–150)
GLOBULIN SER CALC-MCNC: 2 G/DL (ref 1.5–4.5)
GLUCOSE SERPL-MCNC: 83 MG/DL (ref 70–99)
HCT VFR BLD AUTO: 43.3 % (ref 34–46.6)
HGB BLD-MCNC: 14.6 G/DL (ref 11.1–15.9)
IMM GRANULOCYTES # BLD AUTO: 0 X10E3/UL (ref 0–0.1)
IMM GRANULOCYTES NFR BLD AUTO: 0 %
IRON SATN MFR SERPL: 29 % (ref 15–55)
IRON SERPL-MCNC: 82 UG/DL (ref 27–139)
LYMPHOCYTES # BLD AUTO: 2.7 X10E3/UL (ref 0.7–3.1)
LYMPHOCYTES NFR BLD AUTO: 34 %
MCH RBC QN AUTO: 31.4 PG (ref 26.6–33)
MCHC RBC AUTO-ENTMCNC: 33.7 G/DL (ref 31.5–35.7)
MCV RBC AUTO: 93 FL (ref 79–97)
MONOCYTES # BLD AUTO: 0.7 X10E3/UL (ref 0.1–0.9)
MONOCYTES NFR BLD AUTO: 8 %
NEUTROPHILS # BLD AUTO: 4.3 X10E3/UL (ref 1.4–7)
NEUTROPHILS NFR BLD AUTO: 54 %
PLATELET # BLD AUTO: 278 X10E3/UL (ref 150–450)
POTASSIUM SERPL-SCNC: 5.3 MMOL/L (ref 3.5–5.2)
PROT SERPL-MCNC: 6.2 G/DL (ref 6–8.5)
RBC # BLD AUTO: 4.65 X10E6/UL (ref 3.77–5.28)
SODIUM SERPL-SCNC: 141 MMOL/L (ref 134–144)
TIBC SERPL-MCNC: 283 UG/DL (ref 250–450)
TSH SERPL DL<=0.005 MIU/L-ACNC: 4.6 UIU/ML (ref 0.45–4.5)
UIBC SERPL-MCNC: 201 UG/DL (ref 118–369)
WBC # BLD AUTO: 7.9 X10E3/UL (ref 3.4–10.8)

## 2022-10-31 DIAGNOSIS — E03.9 ACQUIRED HYPOTHYROIDISM: ICD-10-CM

## 2022-11-01 RX ORDER — LEVOTHYROXINE SODIUM 137 UG/1
137 TABLET ORAL DAILY
Qty: 90 TABLET | Refills: 1 | Status: SHIPPED | OUTPATIENT
Start: 2022-11-01 | End: 2023-01-02

## 2022-11-01 NOTE — TELEPHONE ENCOUNTER
See result note as well.    In regards to the bone density test, if she has never had 1, and now that she is 67, she is eligible to get 1.  Let me know if she would would like to proceed with that.    Lets see what the blood work results show from arthritis doctor and then can decide on the gabapentin if she wants to try that.

## 2022-11-01 NOTE — TELEPHONE ENCOUNTER
Pt informed and understood. Would like a bone density test , has not had one before. See other message from result note

## 2022-11-03 DIAGNOSIS — Z78.0 ASYMPTOMATIC MENOPAUSAL STATE: ICD-10-CM

## 2022-11-03 DIAGNOSIS — Z13.820 OSTEOPOROSIS SCREENING: Primary | ICD-10-CM

## 2022-11-04 ENCOUNTER — OFFICE VISIT (OUTPATIENT)
Dept: BEHAVIORAL HEALTH | Facility: CLINIC | Age: 67
End: 2022-11-04

## 2022-11-04 DIAGNOSIS — F43.10 POST TRAUMATIC STRESS DISORDER (PTSD): Primary | ICD-10-CM

## 2022-11-04 PROCEDURE — 90791 PSYCH DIAGNOSTIC EVALUATION: CPT | Performed by: SOCIAL WORKER
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
## 2022-11-14 DIAGNOSIS — F41.9 ANXIETY: ICD-10-CM

## 2022-11-14 RX ORDER — VENLAFAXINE HYDROCHLORIDE 150 MG/1
CAPSULE, EXTENDED RELEASE ORAL
Qty: 30 CAPSULE | Refills: 0 | Status: SHIPPED | OUTPATIENT
Start: 2022-11-14 | End: 2023-02-14 | Stop reason: SDUPTHER

## 2022-11-18 ENCOUNTER — TELEPHONE (OUTPATIENT)
Dept: FAMILY MEDICINE CLINIC | Facility: CLINIC | Age: 67
End: 2022-11-18

## 2022-11-18 DIAGNOSIS — K62.5 BRIGHT RED BLOOD PER RECTUM: Primary | ICD-10-CM

## 2022-11-18 NOTE — TELEPHONE ENCOUNTER
PT CALLED IN REQUESTING A REFERRAL TO DR. KJ MCKEON. SHE IS HAVING BLOOD IN HER STOOL AND WOULD LIKE TO HAVE A CONSULT WITH HIM ABOUT IT. PLEASE ADVISE IF REFERRAL CAN BE PLACED.

## 2022-11-18 NOTE — TELEPHONE ENCOUNTER
It was not w/ a hard stool, didn't strain to go. Having loose stools. Yes, only having bleeding w/ bm. Denies constipation, rectal pain and hemorrhoids.

## 2022-11-18 NOTE — TELEPHONE ENCOUNTER
Please call.  More information.  Was it with a hard stool?  Any rectal pain?  Any history of hemorrhoids?  Is it only with bowel movements?

## 2022-11-18 NOTE — TELEPHONE ENCOUNTER
Please call.  I placed referral to Dr. Mustafa but it could take a little while to get into see him.  If this worsens before then, let us know and we may need to see her.

## 2022-11-18 NOTE — TELEPHONE ENCOUNTER
BRBPR started 2-3d ago. I asked if it was a little or a lot, 'more than it should be.' No hx of hemorrhoids.     Go to ER? Appt? Place referral?

## 2022-11-22 ENCOUNTER — OFFICE VISIT (OUTPATIENT)
Dept: BEHAVIORAL HEALTH | Facility: CLINIC | Age: 67
End: 2022-11-22

## 2022-11-22 VITALS — HEIGHT: 59 IN | WEIGHT: 192 LBS | BODY MASS INDEX: 38.71 KG/M2

## 2022-11-22 DIAGNOSIS — F39 UNSPECIFIED MOOD (AFFECTIVE) DISORDER: Primary | ICD-10-CM

## 2022-11-22 PROCEDURE — 90792 PSYCH DIAG EVAL W/MED SRVCS: CPT

## 2022-11-22 RX ORDER — LAMOTRIGINE 25 MG/1
TABLET ORAL
Qty: 42 TABLET | Refills: 0 | Status: SHIPPED | OUTPATIENT
Start: 2022-11-22 | End: 2023-01-18
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
## 2022-12-05 DIAGNOSIS — F51.01 PRIMARY INSOMNIA: ICD-10-CM

## 2022-12-05 RX ORDER — TRAZODONE HYDROCHLORIDE 50 MG/1
TABLET ORAL
Qty: 60 TABLET | Refills: 0 | Status: SHIPPED | OUTPATIENT
Start: 2022-12-05 | End: 2022-12-13

## 2022-12-13 ENCOUNTER — OFFICE VISIT (OUTPATIENT)
Dept: BEHAVIORAL HEALTH | Facility: CLINIC | Age: 67
End: 2022-12-13

## 2022-12-13 VITALS — HEIGHT: 59 IN | WEIGHT: 192 LBS | BODY MASS INDEX: 38.71 KG/M2

## 2022-12-13 DIAGNOSIS — F51.01 PRIMARY INSOMNIA: ICD-10-CM

## 2022-12-13 DIAGNOSIS — G47.33 OBSTRUCTIVE SLEEP APNEA SYNDROME: ICD-10-CM

## 2022-12-13 DIAGNOSIS — F39 UNSPECIFIED MOOD (AFFECTIVE) DISORDER: Primary | ICD-10-CM

## 2022-12-13 PROCEDURE — 99214 OFFICE O/P EST MOD 30 MIN: CPT

## 2022-12-13 RX ORDER — LAMOTRIGINE 100 MG/1
100 TABLET ORAL DAILY
Qty: 30 TABLET | Refills: 2 | Status: SHIPPED | OUTPATIENT
Start: 2022-12-13 | End: 2023-01-18

## 2022-12-13 RX ORDER — TRAZODONE HYDROCHLORIDE 50 MG/1
100 TABLET ORAL NIGHTLY
Qty: 60 TABLET | Refills: 1 | Status: SHIPPED | OUTPATIENT
Start: 2022-12-13 | End: 2023-01-18 | Stop reason: SDUPTHER

## 2022-12-13 NOTE — PROGRESS NOTES
"     Office  Follow Up Visit      Patient Name: Connie Lu  : 1955   MRN: [id number]     Referring Provider: Masoud Martin MD    Chief Complaint: Medication follow-up    ICD-10-CM ICD-9-CM   1. Unspecified mood (affective) disorder ([facility])  F39 296.90   2. Primary insomnia  F51.01 307.42   3. Obstructive sleep apnea syndrome  G47.33 327.23        History of Present Illness:   Connie Lu is a 67 y.o. female who is here today for follow up related to medication management of mood disorder symptomology and insomnia.  Patient reports that her mood seems to be improved stating \"I do not seem as agitated or irritable, and I feel like I am in a better mood.\"  However, patient reports continued difficulty with sustained sleep, reports frequent waking, and states \"I am getting about 4 to 6 hours of sleep at night.\"  Patient reports no noticed adverse side effects related to lamotrigine titration.  Patient also reports continued fatigue and daytime sleepiness.  Patient also reports some continued situational stress related to her sister-in-law and trying to coordinate things around the holidays which can be stressful.      Subjective      Review of Systems:   Review of Systems   Constitutional: Positive for fatigue.   Respiratory: Negative.    Cardiovascular: Negative.    Gastrointestinal: Negative.    Genitourinary: Negative.    Musculoskeletal: Negative.    Skin: Negative.    Psychiatric/Behavioral: Positive for agitation and sleep disturbance.       Patient History:   The following portions of the patient's history were reviewed and updated as appropriate: allergies, current medications, past family history, past medical history, past social history, past surgical history and problem list.     Social:  No change in interval history.    Medications:     Current Outpatient Medications:   •  traZODone (DESYREL) 50 MG tablet, Take 2 tablets by mouth Every Night., Disp: 60 tablet, Rfl: 1  •  " "Ascorbic Acid (VITAMIN C ER PO), Take  by mouth., Disp: , Rfl:   •  Cholecalciferol (VITAMIN D) 2000 units tablet, , Disp: , Rfl:   •  Cyanocobalamin (VITAMIN B-12) 500 MCG lozenge, , Disp: , Rfl:   •  FIBER ADULT GUMMIES PO, , Disp: , Rfl:   •  lamoTRIgine (LaMICtal) 100 MG tablet, Take 1 tablet by mouth Daily., Disp: 30 tablet, Rfl: 2  •  lamoTRIgine (LaMICtal) 25 MG tablet, Take 1 tablet by mouth Daily for 14 days, THEN 2 tablets Daily for 14 days., Disp: 42 tablet, Rfl: 0  •  levothyroxine (SYNTHROID, LEVOTHROID) 137 MCG tablet, Take 1 tablet by mouth Daily., Disp: 90 tablet, Rfl: 1  •  losartan (COZAAR) 100 MG tablet, TAKE ONE TABLET BY MOUTH EVERY DAY, Disp: 30 tablet, Rfl: 2  •  Multiple Vitamin (CALCIUM COMPLEX PO), Take  by mouth., Disp: , Rfl:   •  omeprazole (priLOSEC) 20 MG capsule, TAKE ONE CAPSULE BY MOUTH EVERY DAY, Disp: 30 capsule, Rfl: 2  •  venlafaxine XR (EFFEXOR-XR) 150 MG 24 hr capsule, TAKE ONE CAPSULE BY MOUTH EVERY DAY, Disp: 30 capsule, Rfl: 0    Objective     Physical Exam:  Vital Signs:   Vitals:    12/13/22 1240   Weight: 87.1 kg (192 lb)   Height: 149.9 cm (59\")     Body mass index is 38.78 kg/m².     Mental Status Exam:   Hygiene:   good  Cooperation:  Cooperative  Eye Contact:  Good  Psychomotor Behavior:  Appropriate  Affect:  Appropriate  Mood: euthymic  Speech:  Normal  Thought Process:  Circum  Thought Content:  Mood congruent  Suicidal:  None  Homicidal:  None  Hallucinations:  None  Delusion:  None  Memory:  Intact  Orientation:  Grossly intact  Reliability:  good  Insight:  Fair  Judgement:  Fair  Impulse Control:  Fair  Physical/Medical Issues:  Yes Multiple medical comorbidities including sleep apnea, see chart     Assessment / Plan      Visit Diagnosis/Orders Placed This Visit:  Diagnoses and all orders for this visit:    1. Unspecified mood (affective) disorder (HCC) (Primary)  -     lamoTRIgine (LaMICtal) 100 MG tablet; Take 1 tablet by mouth Daily.  Dispense: 30 tablet; " Refill: 2    2. Primary insomnia  -     traZODone (DESYREL) 50 MG tablet; Take 2 tablets by mouth Every Night.  Dispense: 60 tablet; Refill: 1    3. Obstructive sleep apnea syndrome  -     Ambulatory Referral to Sleep Medicine         Differential:   N/A    Plan:   1. Continue lamotrigine 50 mg until 2-week titration is completed as first prescribed.  Then increase to 100 mg.  Instructed patient to skip tonight's dose and take in a.m. to see if insomnia improves.  2. Continue trazodone 100 mg at night.  3. Ambulatory referral to sleep medicine for reevaluation of sleep apnea and possible redo sleep study.  4. Recommendation: Continue sleep hygiene and positive affirmation.  5. Discussed ways to minimize stress and anxiety.  6. Continue psychotherapy with LCSW as scheduled.    Continue supportive psychotherapy efforts and medications as indicated. Treatment and medication options discussed during today's visit. Patient ackowledged and verbally consented to continue with current treatment plan and was educated on the importance of compliance with treatment and follow-up appointments. Patient seems reasonably able to adhere to treatment plan.      Medication Considerations:  Discussed medication options and treatment plan of prescribed medication(s) as well as the risks, benefits, and potential side effects. Patient is agreeable to call the office with any worsening of symptoms or onset of side effects. Patient is agreeable to call 911 or go to the nearest ER should he/she begin having SI/HI.    Quality Measures:   Never smoker    I advised Connie Lu of the risks of tobacco use.     Follow Up:   Return in about 3 weeks (around 1/3/2023) for Recheck.      JAYANT Berger, PMHNP-BC    Answers for HPI/ROS submitted by the patient on 12/12/2022  Please describe your symptoms.: Follow up on new prescription  Have you had these symptoms before?: No  How long have you been having these symptoms?: Greater than 2  weeks  What is the primary reason for your visit?: Other

## 2022-12-20 ENCOUNTER — OFFICE VISIT (OUTPATIENT)
Dept: BEHAVIORAL HEALTH | Facility: CLINIC | Age: 67
End: 2022-12-20

## 2022-12-20 DIAGNOSIS — F43.10 POST TRAUMATIC STRESS DISORDER (PTSD): Primary | ICD-10-CM

## 2022-12-20 PROCEDURE — 90834 PSYTX W PT 45 MINUTES: CPT | Performed by: SOCIAL WORKER

## 2022-12-20 NOTE — PROGRESS NOTES
[facility]                  Follow Up Adult      Follow Up Adult Note     Date:2022   Patient Name: Connie Lu  : 1955   MRN: [id number]   Time IN: 2:15 PM    Time OUT: 3:00 PM     Referring Provider: Masoud Martin MD    Chief Complaint:      ICD-10-CM ICD-9-CM   1. Post traumatic stress disorder (PTSD)  F43.10 309.81        History of Present Illness:   Connie Lu is a 67 y.o. female who is being seen today for follow up individual Psychotherapy session.        Subjective     Assessment Scores:   PHQ-9 Total Score: PHQ-9 Total Score:     SPENSER-7 Score:      Patient's Support Network Includes:  extended family    Functional Status: Moderate impairment     Progress toward goal: Not at goal    Prognosis: Good with Ongoing Treatment     Medications:     Current Outpatient Medications:   •  Ascorbic Acid (VITAMIN C ER PO), Take  by mouth., Disp: , Rfl:   •  Cholecalciferol (VITAMIN D) 2000 units tablet, , Disp: , Rfl:   •  Cyanocobalamin (VITAMIN B-12) 500 MCG lozenge, , Disp: , Rfl:   •  FIBER ADULT GUMMIES PO, , Disp: , Rfl:   •  lamoTRIgine (LaMICtal) 100 MG tablet, Take 1 tablet by mouth Daily., Disp: 30 tablet, Rfl: 2  •  lamoTRIgine (LaMICtal) 25 MG tablet, Take 1 tablet by mouth Daily for 14 days, THEN 2 tablets Daily for 14 days., Disp: 42 tablet, Rfl: 0  •  levothyroxine (SYNTHROID, LEVOTHROID) 137 MCG tablet, Take 1 tablet by mouth Daily., Disp: 90 tablet, Rfl: 1  •  losartan (COZAAR) 100 MG tablet, TAKE ONE TABLET BY MOUTH EVERY DAY, Disp: 30 tablet, Rfl: 2  •  Multiple Vitamin (CALCIUM COMPLEX PO), Take  by mouth., Disp: , Rfl:   •  omeprazole (priLOSEC) 20 MG capsule, TAKE ONE CAPSULE BY MOUTH EVERY DAY, Disp: 30 capsule, Rfl: 2  •  traZODone (DESYREL) 50 MG tablet, Take 2 tablets by mouth Every Night., Disp: 60 tablet, Rfl: 1  •  venlafaxine XR (EFFEXOR-XR) 150 MG 24 hr capsule, TAKE ONE CAPSULE BY MOUTH EVERY DAY,  Disp: 30 capsule, Rfl: 0    Allergies:   Allergies   Allergen Reactions   • Cephalexin Hives and Itching     Tolerates PCN fine, tolerates other cephalosporins well.    • Hydrocodone-Acetaminophen Other (See Comments)     Flushed face/redness   • Sudafed [Pseudoephedrine Hcl] Itching     Scalp itching       Objective     Mental Status Exam:   Hygiene:   good  Cooperation:  Cooperative  Eye Contact:  Good  Psychomotor Behavior:  Appropriate  Affect:  Tearful  Mood: anxious  Speech:  Normal  Thought Process:  Goal directed and Linear  Thought Content:  Mood congruent  Suicidal:  None  Homicidal:  None  Hallucinations:  None  Delusion:  None  Memory:  Intact  Orientation:  Person, Place, Time and Situation  Reliability:  good  Insight:  Good  Judgement:  Good  Impulse Control:  Good  Physical/Medical Issues:  None reported at this time     Assessment / Plan      Visit Diagnosis/Orders Placed This Visit:    ICD-10-CM ICD-9-CM   1. Post traumatic stress disorder (PTSD)  F43.10 309.81          PLAN:  1. Safety: No acute safety concerns  2. Risk Assessment: Risk of self-harm acutely is low. Risk of self-harm chronically is also low, but could be further elevated in the event of treatment noncompliance and/or AODA.    Patient met with the undersigned on this day in office for individual psychotherapy session. She affirmed difficulty managing sadness, crying spells, and loneliness due to the death of her mother last month as well as difficulty managing bereavement over the holiday season. Therapist facilitated patient exploration of the impact of bereavement and daily life stressors upon current thoughts, feelings, behavior, and interpersonal relationships in session and offered support and validation of patient's emotional experience. Therapist and patient discussed the importance of maintaining healthy interpersonal boundaries with family members as well as practicing positive self talk for emotional regulation. Patient  denied any current suicidal or homicidal ideation. She further denied any death wishes or auditory/visual hallucinations; oriented to person, place, time, and situation. Patient will continue weekly outpatient psychotherapy.    Treatment Plan/Goals: Continue supportive psychotherapy efforts and medications as indicated. Treatment and medication options discussed during today's visit. Patient ackowledged and verbally consented to continue with current treatment plan and was educated on the importance of compliance with treatment and follow-up appointments. Patient seems reasonably able to adhere to treatment plan.      Assisted Patient in processing above session content; acknowledged and normalized patient’s thoughts, feelings, and concerns.  Rationalized patient thought process regarding symptoms and daily life stressors.      Allowed Patient to freely discuss issues  without interruption or judgement with unconditional positive regard, active listening skills, and empathy. Therapist provided a safe, confidential environment to facilitate the development of a positive therapeutic relationship and encouraged open, honest communication. Assisted Patient in identifying risk factors which would indicate the need for higher level of care including thoughts to harm self or others and/or self-harming behavior and encouraged Patient to contact this office, call 911, or present to the nearest emergency room should any of these events occur. Discussed crisis intervention services and means to access. Patient adamantly and convincingly denies current suicidal or homicidal ideation or perceptual disturbance. Assisted Patient in processing session content; acknowledged and normalized Patient’s thoughts, feelings, and concerns by utilizing a person-centered approach in efforts to build appropriate rapport and a positive therapeutic relationship with open and honest communication.      Quality Measures:     TOBACCO USE:  Never  smoker    Follow Up:   Return in about 1 week (around 12/27/2022) for Psychotherapy Follow-Up.      Carmen Zhang LCSW

## 2022-12-21 RX ORDER — OMEPRAZOLE 20 MG/1
CAPSULE, DELAYED RELEASE ORAL
Qty: 30 CAPSULE | Refills: 2 | Status: SHIPPED | OUTPATIENT
Start: 2022-12-21 | End: 2023-03-15

## 2022-12-31 DIAGNOSIS — I10 ESSENTIAL HYPERTENSION: ICD-10-CM

## 2022-12-31 RX ORDER — LOSARTAN POTASSIUM 100 MG/1
TABLET ORAL
Qty: 30 TABLET | Refills: 5 | Status: SHIPPED | OUTPATIENT
Start: 2022-12-31 | End: 2023-02-14 | Stop reason: SDUPTHER

## 2023-01-02 DIAGNOSIS — E03.9 ACQUIRED HYPOTHYROIDISM: ICD-10-CM

## 2023-01-02 RX ORDER — LEVOTHYROXINE SODIUM 137 UG/1
TABLET ORAL
Qty: 30 TABLET | Refills: 1 | Status: SHIPPED | OUTPATIENT
Start: 2023-01-02 | End: 2023-03-01

## 2023-01-18 ENCOUNTER — OFFICE VISIT (OUTPATIENT)
Dept: BEHAVIORAL HEALTH | Facility: CLINIC | Age: 68
End: 2023-01-18
Payer: MEDICARE

## 2023-01-18 VITALS
BODY MASS INDEX: 38.71 KG/M2 | DIASTOLIC BLOOD PRESSURE: 99 MMHG | WEIGHT: 192 LBS | SYSTOLIC BLOOD PRESSURE: 131 MMHG | HEIGHT: 59 IN

## 2023-01-18 DIAGNOSIS — F39 UNSPECIFIED MOOD (AFFECTIVE) DISORDER: ICD-10-CM

## 2023-01-18 DIAGNOSIS — F51.01 PRIMARY INSOMNIA: ICD-10-CM

## 2023-01-18 PROCEDURE — 99214 OFFICE O/P EST MOD 30 MIN: CPT

## 2023-01-18 RX ORDER — LAMOTRIGINE 150 MG/1
150 TABLET ORAL DAILY
Qty: 30 TABLET | Refills: 1 | Status: SHIPPED | OUTPATIENT
Start: 2023-01-18 | End: 2024-01-18

## 2023-01-18 RX ORDER — TRAZODONE HYDROCHLORIDE 50 MG/1
100 TABLET ORAL NIGHTLY
Qty: 60 TABLET | Refills: 1 | Status: SHIPPED | OUTPATIENT
Start: 2023-01-18

## 2023-01-18 NOTE — PROGRESS NOTES
"     Office  Follow Up Visit      Patient Name: Connie Lu  : 1955   MRN: [id number]     Referring Provider: Masoud Martin MD    Chief Complaint: Medication follow-up    ICD-10-CM ICD-9-CM   1. Unspecified mood (affective) disorder ([facility])  F39 296.90   2. Primary insomnia  F51.01 307.42        History of Present Illness:   Connie Lu is a 67 y.o. female who is here today for follow up related to medication management of mood disorder and insomnia.  Patient reports that she is continually struggling with her past relationship stating \"it is really hard at times and being alone is hard.\"  Patient reports continued difficulties with sustained sleep stating \"trazodone helps me fall asleep but I still wake up frequently throughout the night and I been taking naps during the day.\"  Patient reports that she is starting a new job working at the [employer] and is \"excited to start something new.\"  Patient also reports she has an upcoming appointment with sleep medicine in March.  Patient also affirms continually struggling with past \"mistakes\" and continued anxiety related to the future and unknown.  Patient reports no noticed adverse effects related to current medication regimen.      Subjective      Review of Systems:   Review of Systems   Constitutional: Positive for fatigue.   Respiratory: Negative.    Cardiovascular: Negative.    Gastrointestinal: Negative.    Genitourinary: Negative.    Musculoskeletal: Negative.    Skin: Negative.    Neurological: Negative.    Psychiatric/Behavioral: Positive for dysphoric mood and sleep disturbance. The patient is nervous/anxious.         Patient History:   The following portions of the patient's history were reviewed and updated as appropriate: allergies, current medications, past family history, past medical history, past social history, past surgical history and problem list.     Social:  Patient will be starting a new job working at the " "[employer].    Medications:     Current Outpatient Medications:   •  lamoTRIgine (LaMICtal) 150 MG tablet, Take 1 tablet by mouth Daily., Disp: 30 tablet, Rfl: 1  •  traZODone (DESYREL) 50 MG tablet, Take 2 tablets by mouth Every Night., Disp: 60 tablet, Rfl: 1  •  Ascorbic Acid (VITAMIN C ER PO), Take  by mouth., Disp: , Rfl:   •  Cholecalciferol (VITAMIN D) 2000 units tablet, , Disp: , Rfl:   •  Cyanocobalamin (VITAMIN B-12) 500 MCG lozenge, , Disp: , Rfl:   •  FIBER ADULT GUMMIES PO, , Disp: , Rfl:   •  levothyroxine (SYNTHROID, LEVOTHROID) 137 MCG tablet, TAKE 1 TABLET BY MOUTH EVERY DAY, Disp: 30 tablet, Rfl: 1  •  losartan (COZAAR) 100 MG tablet, TAKE ONE TABLET BY MOUTH EVERY DAY, Disp: 30 tablet, Rfl: 5  •  Multiple Vitamin (CALCIUM COMPLEX PO), Take  by mouth., Disp: , Rfl:   •  omeprazole (priLOSEC) 20 MG capsule, TAKE ONE CAPSULE BY MOUTH EVERY DAY, Disp: 30 capsule, Rfl: 2  •  venlafaxine XR (EFFEXOR-XR) 150 MG 24 hr capsule, TAKE ONE CAPSULE BY MOUTH EVERY DAY, Disp: 30 capsule, Rfl: 0    Objective     Physical Exam:  Vital Signs:   Vitals:    01/18/23 1302   BP: 131/99   Weight: 87.1 kg (192 lb)   Height: 149.9 cm (59\")     Body mass index is 38.78 kg/m².     Mental Status Exam:    Hygiene:   good  Cooperation:  Cooperative  Eye Contact:  Good  Psychomotor Behavior:  Appropriate  Affect:  Full range  Mood: sad  Speech:  Pressured  Thought Process:  Circum  Thought Content:  Mood congruent  Suicidal:  None  Homicidal:  None  Hallucinations:  None  Delusion:  None  Memory:  Intact  Orientation:  Grossly intact  Reliability:  good  Insight:  Poor  Judgement:  Fair  Impulse Control:  Poor  Physical/Medical Issues:  Yes Several medical comorbidities, see chart     Assessment / Plan      Visit Diagnosis/Orders Placed This Visit:  Diagnoses and all orders for this visit:    1. Unspecified mood (affective) disorder ([facility])  -     lamoTRIgine (LaMICtal) 150 MG tablet; Take 1 tablet by mouth Daily.  " Dispense: 30 tablet; Refill: 1    2. Primary insomnia  -     traZODone (DESYREL) 50 MG tablet; Take 2 tablets by mouth Every Night.  Dispense: 60 tablet; Refill: 1         Differential:   Sleep apnea    Plan:   1. Increase lamotrigine to 150 mg daily.  2. Continue trazodone 100 mg at night.  3. Continue psychotherapy with LCSW as scheduled.  4. Recommendation: Continue self-care and sleep hygiene.  5. Discussed mindfulness and how to work through grief process.    Continue supportive psychotherapy efforts and medications as indicated. Treatment and medication options discussed during today's visit. Patient ackowledged and verbally consented to continue with current treatment plan and was educated on the importance of compliance with treatment and follow-up appointments. Patient seems reasonably able to adhere to treatment plan.      Medication Considerations:  Discussed medication options and treatment plan of prescribed medication(s) as well as the risks, benefits, and potential side effects. Patient is agreeable to call the office with any worsening of symptoms or onset of side effects. Patient is agreeable to call 911 or go to the nearest ER should he/she begin having SI/HI.    Quality Measures:   Former smoker    I advised Connie Lu of the risks of tobacco use.     Follow Up:   Return in about 3 weeks (around 2/8/2023) for Recheck.      JAYANT Berger, [facility] Hospital[facility]-BC    Answers for HPI/ROS submitted by the patient on 1/17/2023  Please describe your symptoms.: Depressed, anxious, hard to make decisions and follow through.  Have you had these symptoms before?: Yes  How long have you been having these symptoms?: Greater than 2 weeks  Please list any medications you are currently taking for this condition.: Effexor, lamotrigine, trazadone  Please describe any probable cause for these symptoms. : Don't know  What is the primary reason for your visit?: Other

## 2023-02-14 ENCOUNTER — OFFICE VISIT (OUTPATIENT)
Dept: FAMILY MEDICINE CLINIC | Facility: CLINIC | Age: 68
End: 2023-02-14
Payer: MEDICARE

## 2023-02-14 VITALS
SYSTOLIC BLOOD PRESSURE: 134 MMHG | DIASTOLIC BLOOD PRESSURE: 84 MMHG | RESPIRATION RATE: 18 BRPM | HEIGHT: 59 IN | WEIGHT: 195 LBS | HEART RATE: 78 BPM | TEMPERATURE: 98.4 F | BODY MASS INDEX: 39.31 KG/M2

## 2023-02-14 DIAGNOSIS — Z00.00 MEDICARE ANNUAL WELLNESS VISIT, INITIAL: Primary | ICD-10-CM

## 2023-02-14 DIAGNOSIS — E03.9 ACQUIRED HYPOTHYROIDISM: ICD-10-CM

## 2023-02-14 DIAGNOSIS — Z12.11 COLON CANCER SCREENING: ICD-10-CM

## 2023-02-14 DIAGNOSIS — Z78.0 ASYMPTOMATIC MENOPAUSAL STATE: ICD-10-CM

## 2023-02-14 DIAGNOSIS — Z13.820 OSTEOPOROSIS SCREENING: ICD-10-CM

## 2023-02-14 DIAGNOSIS — F41.9 ANXIETY: ICD-10-CM

## 2023-02-14 DIAGNOSIS — I10 ESSENTIAL HYPERTENSION: ICD-10-CM

## 2023-02-14 DIAGNOSIS — R73.9 HYPERGLYCEMIA: ICD-10-CM

## 2023-02-14 PROCEDURE — 1125F AMNT PAIN NOTED PAIN PRSNT: CPT | Performed by: FAMILY MEDICINE

## 2023-02-14 PROCEDURE — 99213 OFFICE O/P EST LOW 20 MIN: CPT | Performed by: FAMILY MEDICINE

## 2023-02-14 PROCEDURE — 1159F MED LIST DOCD IN RCRD: CPT | Performed by: FAMILY MEDICINE

## 2023-02-14 PROCEDURE — G0438 PPPS, INITIAL VISIT: HCPCS | Performed by: FAMILY MEDICINE

## 2023-02-14 PROCEDURE — 1170F FXNL STATUS ASSESSED: CPT | Performed by: FAMILY MEDICINE

## 2023-02-14 RX ORDER — VENLAFAXINE HYDROCHLORIDE 150 MG/1
150 CAPSULE, EXTENDED RELEASE ORAL DAILY
Qty: 30 CAPSULE | Refills: 5 | Status: SHIPPED | OUTPATIENT
Start: 2023-02-14

## 2023-02-14 RX ORDER — TRAMADOL HYDROCHLORIDE 50 MG/1
TABLET ORAL
COMMUNITY
Start: 2023-02-09 | End: 2023-03-09

## 2023-02-14 RX ORDER — LOSARTAN POTASSIUM 100 MG/1
100 TABLET ORAL DAILY
Qty: 30 TABLET | Refills: 5 | Status: SHIPPED | OUTPATIENT
Start: 2023-02-14

## 2023-02-14 NOTE — PROGRESS NOTES
The ABCs of the Annual Wellness Visit  Initial Medicare Wellness Visit    Subjective     Connie Lu is a 67 y.o. female who presents for an Initial Medicare Wellness Visit.    The following portions of the patient's history were reviewed and   updated as appropriate: allergies, current medications, past family history, past medical history, past social history, past surgical history and problem list.       The patient presents today for her Medicare checkup.      Health maintenance  She feels that same as she did a year ago. The patient has not been admitted to the hospital in the last year. She does not take aspirin daily. The patient does not have a living will. She states that her mother did pass away in 11/2022 at 83 years old. The patient does not eat a lot of junk food. She tries not to eat any fried food. The patient does not drink any pop. She may have 1 to 2 cups of coffee a day. The patient does not eat enough vegetables. She feels like she overeats some days. The patient denies any substantial memory loss. She has gone into a room and forgot why she went in there. The patient does not get lost. She feels off balance on a regular basis.     The patient has had trazodone for quite some time. She states that she worked up to 2 trazodone, but now it seems to be not helping. The patient reports that JAYANT Berger has put her on a low dose of lamotrigine. She states that sometimes she cannot go to sleep. The patient has been waking up twice in the middle of the night to go to the bathroom. She had a colonoscopy in 2016. The patient reports that it has been almost 5 years since she had a sleeve gastrectomy. She does not drink any soft drinks, but does consume bread and sugar. The patient has not lost as much weight as she would have liked to. The patient has not had a bone density test. She has not received the COVID-19 vaccine and she is not planning on it. She will wait to get Prevnar vaccine.  She has had the Shingrix vaccine. The patient is overdue for an eye exam. She does not have a dentist. The patient denies hysterectomy. The patient had a mastectomy on one side and augmentation on the other side. Her last Pap smear was either late last year or the first of this year. The patient denies any hearing problems. She states that her eyes are super dry. The patient has had cataracts removed from both eyes.     Compared to one year ago, the patient feels her physical   health is the same.    Compared to one year ago, the patient feels her mental   health is the same.    Recent Hospitalizations:  She was not admitted to the hospital during the last year.       Current Medical Providers:  Patient Care Team:  Masoud Martin MD as PCP - General (Family Medicine)    Outpatient Medications Prior to Visit   Medication Sig Dispense Refill   • Ascorbic Acid (VITAMIN C ER PO) Take  by mouth.     • Cholecalciferol (VITAMIN D) 2000 units tablet      • Cyanocobalamin (VITAMIN B-12) 500 MCG lozenge      • lamoTRIgine (LaMICtal) 150 MG tablet Take 1 tablet by mouth Daily. 30 tablet 1   • levothyroxine (SYNTHROID, LEVOTHROID) 137 MCG tablet TAKE 1 TABLET BY MOUTH EVERY DAY 30 tablet 1   • Multiple Vitamin (CALCIUM COMPLEX PO) Take  by mouth.     • omeprazole (priLOSEC) 20 MG capsule TAKE ONE CAPSULE BY MOUTH EVERY DAY 30 capsule 2   • traMADol (ULTRAM) 50 MG tablet      • traZODone (DESYREL) 50 MG tablet Take 2 tablets by mouth Every Night. 60 tablet 1   • losartan (COZAAR) 100 MG tablet TAKE ONE TABLET BY MOUTH EVERY DAY 30 tablet 5   • venlafaxine XR (EFFEXOR-XR) 150 MG 24 hr capsule TAKE ONE CAPSULE BY MOUTH EVERY DAY 30 capsule 0     No facility-administered medications prior to visit.       Opioid medication/s are on active medication list.  and I have evaluated her active treatment plan and pain score trends (see table).  Vitals:    02/14/23 1408   PainSc: 6  Comment: knee/ leg     I have reviewed the chart for  "potential of high risk medication and harmful drug interactions in the elderly.            Aspirin is not on active medication list.  Aspirin use is not indicated based on review of current medical condition/s. Risk of harm outweighs potential benefits.  .    Patient Active Problem List   Diagnosis   • Allergic rhinitis   • Malignant neoplasm of breast (HCC)   • Carpal tunnel syndrome   • Unspecified mood (affective) disorder (HCC)   • Hyperlipidemia   • Hypertension   • Hypothyroidism   • Obstructive sleep apnea syndrome   • Knee pain   • Vitamin D deficiency   • Hyperglycemia   • Primary insomnia   • Anxiety   • Diverticulosis   • Insomnia   • Heart murmur   • Lower back pain   • Kidney function abnormal   • Joint pain   • Iron deficiency   • GERD (gastroesophageal reflux disease)   • Attention deficit disorder   • Asthma   • Prediabetes     Advance Care Planning  Advance Directive is not on file.  ACP discussion was held with the patient during this visit. Patient does not have an advance directive, information provided.       Objective    Vitals:    02/14/23 1408   BP: 134/84   Pulse: 78   Resp: 18   Temp: 98.4 °F (36.9 °C)   Weight: 88.5 kg (195 lb)   Height: 149.9 cm (59\")   PainSc: 6  Comment: knee/ leg     Estimated body mass index is 39.39 kg/m² as calculated from the following:    Height as of this encounter: 149.9 cm (59\").    Weight as of this encounter: 88.5 kg (195 lb).    Class 2 Severe Obesity (BMI >=35 and <=39.9). Obesity-related health conditions include the following: hypertension. Obesity is unchanged. BMI is is above average; BMI management plan is completed. We discussed portion control and increasing exercise.      Does the patient have evidence of cognitive impairment?   No          HEALTH RISK ASSESSMENT    Smoking Status:  Social History     Tobacco Use   Smoking Status Never   Smokeless Tobacco Never     Alcohol Consumption:  Social History     Substance and Sexual Activity   Alcohol Use " No     Fall Risk Screen:    [facility], and patient is at HIGH risk for falls. Assessment completed on:2/14/2023    Depression Screen:   PHQ-2/PHQ-9 Depression Screening 2/14/2023   Little Interest or Pleasure in Doing Things 1-->several days   Feeling Down, Depressed or Hopeless 1-->several days   Trouble Falling or Staying Asleep, or Sleeping Too Much 1-->several days   Feeling Tired or Having Little Energy 0-->not at all   Poor Appetite or Overeating 1-->several days   Feeling Bad about Yourself - or that You are a Failure or Have Let Yourself or Your Family Down 1-->several days   Trouble Concentrating on Things, Such as Reading the Newspaper or Watching Television 1-->several days   Moving or Speaking So Slowly that Other People Could Have Noticed? Or the Opposite - Being So Fidgety 0-->not at all   Thoughts that You Would be Better Off Dead or of Hurting Yourself in Some Way 0-->not at all   PHQ-9: Brief Depression Severity Measure Score 6   If You Checked Off Any Problems, How Difficult Have These Problems Made It For You to Do Your Work, Take Care of Things at Home, or Get Along with Other People? not difficult at all       Health Habits and Functional and Cognitive Screening:  Functional & Cognitive Status 2/14/2023   Do you have difficulty preparing food and eating? No   Do you have difficulty bathing yourself, getting dressed or grooming yourself? No   Do you have difficulty using the toilet? No   Do you have difficulty moving around from place to place? Yes   Do you have trouble with steps or getting out of a bed or a chair? No   Current Diet Limited Junk Food   Dental Exam Not up to date   Eye Exam Not up to date   Exercise (times per week) 1 times per week   Current Exercises Include Walking   Do you need help using the phone?  No   Are you deaf or do you have serious difficulty hearing?  No   Do you need help with transportation? No   Do you need help shopping? No   Do  you need help preparing meals?  No   Do you need help with housework?  No   Do you need help with laundry? No   Do you need help taking your medications? No   Do you need help managing money? Yes   Do you ever drive or ride in a car without wearing a seat belt? No   Have you felt unusual stress, anger or loneliness in the last month? Yes   Who do you live with? Alone   If you need help, do you have trouble finding someone available to you? No   Have you been bothered in the last four weeks by sexual problems? No   Do you have difficulty concentrating, remembering or making decisions? No       Age-appropriate Screening Schedule:  Refer to the list below for future screening recommendations based on patient's age, sex and/or medical conditions. Orders for these recommended tests are listed in the plan section. The patient has been provided with a written plan.    Health Maintenance   Topic Date Due   • DXA SCAN  Never done   • TDAP/TD VACCINES (1 - Tdap) Never done   • DIABETIC FOOT EXAM  Never done   • URINE MICROALBUMIN  08/13/2019   • DIABETIC EYE EXAM  08/14/2023 (Originally 2/1/2018)   • LIPID PANEL  04/08/2023   • HEMOGLOBIN A1C  04/08/2023   • MAMMOGRAM  04/06/2024   • PAP SMEAR  01/01/2025   • INFLUENZA VACCINE  Completed   • ZOSTER VACCINE  Completed          CMS Preventative Services Quick Reference  Risk Factors Identified During Encounter    Fall Risk-High or Moderate: recent fall at work  Immunizations Discussed/Encouraged: Prevnar 20 (Pneumococcal 20-valent conjugate) and she will consider  Dental Screening Recommended  Vision Screening Recommended    The above risks/problems have been discussed with the patient.  Pertinent information has been shared with the patient in the After Visit Summary.  An After Visit Summary and PPPS were made available to the patient.  Diagnoses and all orders for this visit:    1. Medicare annual wellness visit, initial (Primary)    2. Essential hypertension  -     losartan  (COZAAR) 100 MG tablet; Take 1 tablet by mouth Daily.  Dispense: 30 tablet; Refill: 5  -     CBC & Differential; Future  -     Comprehensive Metabolic Panel; Future  -     Lipid Panel With / Chol / HDL Ratio; Future    3. Anxiety  -     venlafaxine XR (EFFEXOR-XR) 150 MG 24 hr capsule; Take 1 capsule by mouth Daily.  Dispense: 30 capsule; Refill: 5    4. Acquired hypothyroidism  -     TSH; Future    5. Colon cancer screening  -     Ambulatory Referral For Screening Colonoscopy    6. Hyperglycemia  -     Hemoglobin A1c; Future    7. Osteoporosis screening    8. Asymptomatic menopausal state  -     DEXA Bone Density Axial; Future      Follow Up:  Next Medicare Wellness visit to be scheduled in 1 year.        Additional E&M Note during same encounter follows:  Patient has multiple medical problems which are significant and separately identifiable that require additional work above and beyond the Medicare Wellness Visit.      Chief Complaint  Medicare Wellness-Initial Visit    Subjective        HPI  Connie Lu is also being seen today for       Fall  The patient fell 2 weeks ago at work. She does not feel well. The patient needs workers' compensation to approve the CT scan. She went to the emergency room and the emergency room referred her to Dr. Fontenot. The patient eventually got to see him because she had a personal appointment last Wednesday, 02/08/2023. She states that the doctor was supposed to send records, but he did not dictate it. The patient states a dog tripped her up. The patient was given a brace for her leg. The patient reports that the orthopedic clinician seems to think that there is a small break. She states that her lower extremity was bruised.    Hypertension  The patient is still taking her blood pressure medication. The patient denies any chest pain or dyspnea. She has been having headaches, but she thinks it is because she is stressed out.    Anxiety  The patient is still taking  "venlafaxine. The patient reports that her anxiety had gotten better once she got a job because she was concerned about her income.    Hypothyroidism  The patient is on thyroid medication and it has been increased twice.    Fatigue  The patient has been really tired. She states that it wears her out to try to walk.           Objective   Vital Signs:  /84   Pulse 78   Temp 98.4 °F (36.9 °C)   Resp 18   Ht 149.9 cm (59\")   Wt 88.5 kg (195 lb)   BMI 39.39 kg/m²     Physical Exam  Vitals and nursing note reviewed.   Constitutional:       General: She is not in acute distress.     Appearance: She is well-developed. She is not ill-appearing.   HENT:      Head: Normocephalic and atraumatic.      Right Ear: Hearing, tympanic membrane, ear canal and external ear normal.      Left Ear: Hearing, tympanic membrane, ear canal and external ear normal.      Nose: Nose normal. No congestion or rhinorrhea.      Mouth/Throat:      Mouth: Mucous membranes are moist.      Pharynx: No oropharyngeal exudate or posterior oropharyngeal erythema.   Eyes:      General:         Right eye: No discharge.         Left eye: No discharge.      Conjunctiva/sclera: Conjunctivae normal.      Pupils: Pupils are equal, round, and reactive to light.   Neck:      Thyroid: No thyromegaly.   Cardiovascular:      Rate and Rhythm: Normal rate and regular rhythm.      Heart sounds: Normal heart sounds. No murmur heard.    No friction rub. No gallop.   Pulmonary:      Effort: Pulmonary effort is normal. No respiratory distress.      Breath sounds: Normal breath sounds. No wheezing or rales.   Abdominal:      General: Bowel sounds are normal. There is no distension.      Palpations: Abdomen is soft. There is no mass.      Tenderness: There is no abdominal tenderness. There is no guarding or rebound.   Musculoskeletal:      Right lower leg: No edema.      Left lower leg: No edema.      Comments: She keeps the right leg in extended position. "   Lymphadenopathy:      Cervical: No cervical adenopathy.   Skin:     General: Skin is warm and dry.      Coloration: Skin is not jaundiced or pale.   Neurological:      General: No focal deficit present.      Mental Status: She is alert.   Psychiatric:         Mood and Affect: Mood normal.         Behavior: Behavior normal.                         Assessment and Plan   Diagnoses and all orders for this visit:    1. Medicare annual wellness visit, initial (Primary)    2. Essential hypertension  -     losartan (COZAAR) 100 MG tablet; Take 1 tablet by mouth Daily.  Dispense: 30 tablet; Refill: 5  -     CBC & Differential; Future  -     Comprehensive Metabolic Panel; Future  -     Lipid Panel With / Chol / HDL Ratio; Future    3. Anxiety  -     venlafaxine XR (EFFEXOR-XR) 150 MG 24 hr capsule; Take 1 capsule by mouth Daily.  Dispense: 30 capsule; Refill: 5    4. Acquired hypothyroidism  -     TSH; Future    5. Colon cancer screening  -     Ambulatory Referral For Screening Colonoscopy    6. Hyperglycemia  -     Hemoglobin A1c; Future    7. Osteoporosis screening    8. Asymptomatic menopausal state  -     DEXA Bone Density Axial; Future      Overall, chronic medical problems are stable.  Continue losartan for blood pressure.  Check CBC and CMP and lipid panel.    Anxiety.  Stable on Effexor.  Continue this dose.    Hypothyroidism.  She will follow-up and have blood work done including TSH.    Colon cancer screening.  Set up colonoscopy.    Hyperglycemia.  Recheck A1c.    Set up DEXA scan for osteoporosis screening.    She will continue to follow-up for Worker's Compensation evaluation of her right leg injury.         Follow Up   No follow-ups on file.  Patient was given instructions and counseling regarding her condition or for health maintenance advice. Please see specific information pulled into the AVS if appropriate.     Transcribed from ambient dictation for Masoud Martin MD by Siomara Giles.  02/14/23   15:55  EST    Patient or patient representative verbalized consent to the visit recording.  I have personally performed the services described in this document as transcribed by the above individual, and it is both accurate and complete.  Masoud Martin MD  2/17/2023  08:24 EST

## 2023-02-22 ENCOUNTER — TELEPHONE (OUTPATIENT)
Dept: FAMILY MEDICINE CLINIC | Facility: CLINIC | Age: 68
End: 2023-02-22
Payer: MEDICARE

## 2023-02-22 NOTE — TELEPHONE ENCOUNTER
Called pt for more detail. She has not lost taste or smell, denies any fever or wheezing. She thinks this was all brought on by weather changes. She does have production that is yellow with her cough and not currently taking anything for this.

## 2023-02-22 NOTE — TELEPHONE ENCOUNTER
Caller: Connie Lu    Relationship: Self    Best call back number: [phone]    What medication are you requesting: ANTIBIOTIC     What are your current symptoms: CONGESTION AND COUGHING    How long have you been experiencing symptoms: 2 DAYS    Have you had these symptoms before:    [x] Yes  [] No    Have you been treated for these symptoms before:   [x] Yes  [] No    If a prescription is needed, what is your preferred pharmacy and phone number:    [address] [phone] [facility] [phone]     Additional notes:    PATIENT ADVISED SHE WOULD LIKE MEDICINE CALLED IN TO HELP SYMPTOMS LISTED ABOVE

## 2023-02-23 ENCOUNTER — LAB (OUTPATIENT)
Dept: FAMILY MEDICINE CLINIC | Facility: CLINIC | Age: 68
End: 2023-02-23
Payer: MEDICARE

## 2023-02-23 DIAGNOSIS — I10 ESSENTIAL HYPERTENSION: ICD-10-CM

## 2023-02-23 DIAGNOSIS — R73.9 HYPERGLYCEMIA: ICD-10-CM

## 2023-02-23 DIAGNOSIS — R68.83 CHILLS: ICD-10-CM

## 2023-02-23 DIAGNOSIS — R09.81 NASAL CONGESTION: Primary | ICD-10-CM

## 2023-02-23 DIAGNOSIS — E03.9 ACQUIRED HYPOTHYROIDISM: ICD-10-CM

## 2023-02-23 LAB
EXPIRATION DATE: NORMAL
FLUAV AG UPPER RESP QL IA.RAPID: NOT DETECTED
FLUBV AG UPPER RESP QL IA.RAPID: NOT DETECTED
INTERNAL CONTROL: NORMAL
Lab: NORMAL
SARS-COV-2 AG UPPER RESP QL IA.RAPID: NOT DETECTED

## 2023-02-23 PROCEDURE — 87428 SARSCOV & INF VIR A&B AG IA: CPT | Performed by: FAMILY MEDICINE

## 2023-02-23 RX ORDER — AZITHROMYCIN 250 MG/1
TABLET, FILM COATED ORAL
Qty: 6 TABLET | Refills: 0 | Status: SHIPPED | OUTPATIENT
Start: 2023-02-23 | End: 2023-03-09

## 2023-02-23 NOTE — TELEPHONE ENCOUNTER
Informed pt in office. She started having symptoms she would say Monday evening. But more on Tuesday. Stated she is feeling worse today.   Here now for Blood work and Covid test.

## 2023-02-23 NOTE — TELEPHONE ENCOUNTER
Please call.  Since having fever and the COVID and flu test was negative we will send in a Z-Justin.  Call or follow-up if not improving.  That is an antibiotic.  Continue over-the-counter symptomatic relief.

## 2023-02-23 NOTE — TELEPHONE ENCOUNTER
Please call.  If symptoms have only been a couple days, I would not think she would need an antibiotic at this point.  Okay to try over-the-counter Robitussin-DM to help with the cough.      If she is not done one, she may want to do a COVID test to be sure it is not that.  If it is that and symptoms started within 5 days, she would be a candidate to take the medication called Paxlovid that can help decrease risk of complications from COVID and help her get better faster.

## 2023-02-24 ENCOUNTER — TELEPHONE (OUTPATIENT)
Dept: FAMILY MEDICINE CLINIC | Facility: CLINIC | Age: 68
End: 2023-02-24
Payer: MEDICARE

## 2023-02-24 LAB
ALBUMIN SERPL-MCNC: 4.5 G/DL (ref 3.5–5.2)
ALBUMIN/GLOB SERPL: 2.3 G/DL
ALP SERPL-CCNC: 106 U/L (ref 39–117)
ALT SERPL-CCNC: 17 U/L (ref 1–33)
AST SERPL-CCNC: 23 U/L (ref 1–32)
BASOPHILS # BLD AUTO: 0.02 10*3/MM3 (ref 0–0.2)
BASOPHILS NFR BLD AUTO: 0.2 % (ref 0–1.5)
BILIRUB SERPL-MCNC: 0.5 MG/DL (ref 0–1.2)
BUN SERPL-MCNC: 18 MG/DL (ref 8–23)
BUN/CREAT SERPL: 22.8 (ref 7–25)
CALCIUM SERPL-MCNC: 9.8 MG/DL (ref 8.6–10.5)
CHLORIDE SERPL-SCNC: 103 MMOL/L (ref 98–107)
CHOLEST SERPL-MCNC: 214 MG/DL (ref 0–200)
CHOLEST/HDLC SERPL: 2.58 {RATIO}
CO2 SERPL-SCNC: 33.2 MMOL/L (ref 22–29)
CREAT SERPL-MCNC: 0.79 MG/DL (ref 0.57–1)
EGFRCR SERPLBLD CKD-EPI 2021: 83.1 ML/MIN/1.73
EOSINOPHIL # BLD AUTO: 0.18 10*3/MM3 (ref 0–0.4)
EOSINOPHIL NFR BLD AUTO: 2.1 % (ref 0.3–6.2)
ERYTHROCYTE [DISTWIDTH] IN BLOOD BY AUTOMATED COUNT: 13.8 % (ref 12.3–15.4)
GLOBULIN SER CALC-MCNC: 2 GM/DL
GLUCOSE SERPL-MCNC: 91 MG/DL (ref 65–99)
HBA1C MFR BLD: 5.6 % (ref 4.8–5.6)
HCT VFR BLD AUTO: 44.1 % (ref 34–46.6)
HDLC SERPL-MCNC: 83 MG/DL (ref 40–60)
HGB BLD-MCNC: 14.7 G/DL (ref 12–15.9)
IMM GRANULOCYTES # BLD AUTO: 0.02 10*3/MM3 (ref 0–0.05)
IMM GRANULOCYTES NFR BLD AUTO: 0.2 % (ref 0–0.5)
LDLC SERPL CALC-MCNC: 118 MG/DL (ref 0–100)
LYMPHOCYTES # BLD AUTO: 1.73 10*3/MM3 (ref 0.7–3.1)
LYMPHOCYTES NFR BLD AUTO: 20.4 % (ref 19.6–45.3)
MCH RBC QN AUTO: 31.4 PG (ref 26.6–33)
MCHC RBC AUTO-ENTMCNC: 33.3 G/DL (ref 31.5–35.7)
MCV RBC AUTO: 94.2 FL (ref 79–97)
MONOCYTES # BLD AUTO: 0.78 10*3/MM3 (ref 0.1–0.9)
MONOCYTES NFR BLD AUTO: 9.2 % (ref 5–12)
NEUTROPHILS # BLD AUTO: 5.76 10*3/MM3 (ref 1.7–7)
NEUTROPHILS NFR BLD AUTO: 67.9 % (ref 42.7–76)
NRBC BLD AUTO-RTO: 0 /100 WBC (ref 0–0.2)
PLATELET # BLD AUTO: 289 10*3/MM3 (ref 140–450)
POTASSIUM SERPL-SCNC: 5.5 MMOL/L (ref 3.5–5.2)
PROT SERPL-MCNC: 6.5 G/DL (ref 6–8.5)
RBC # BLD AUTO: 4.68 10*6/MM3 (ref 3.77–5.28)
SODIUM SERPL-SCNC: 144 MMOL/L (ref 136–145)
TRIGL SERPL-MCNC: 71 MG/DL (ref 0–150)
TSH SERPL DL<=0.005 MIU/L-ACNC: 1.01 UIU/ML (ref 0.27–4.2)
VLDLC SERPL CALC-MCNC: 13 MG/DL (ref 5–40)
WBC # BLD AUTO: 8.49 10*3/MM3 (ref 3.4–10.8)

## 2023-02-24 RX ORDER — DEXTROMETHORPHAN HYDROBROMIDE AND PROMETHAZINE HYDROCHLORIDE 15; 6.25 MG/5ML; MG/5ML
5 SYRUP ORAL 4 TIMES DAILY PRN
Qty: 180 ML | Refills: 0 | Status: SHIPPED | OUTPATIENT
Start: 2023-02-24 | End: 2023-03-09

## 2023-02-24 NOTE — TELEPHONE ENCOUNTER
Caller: Connie Lu    Relationship: Self    Best call back number: [phone]     What medication are you requesting: COUGH SYRUP    What are your current symptoms: COUGHING    How long have you been experiencing symptoms: SINCE Tuesday    Have you had these symptoms before:    [x] Yes  [] No    Have you been treated for these symptoms before:   [x] Yes  [] No    If a prescription is needed, what is your preferred pharmacy and phone number:  [address] [phone] [facility] [phone]         Additional notes:

## 2023-02-28 ENCOUNTER — TELEPHONE (OUTPATIENT)
Dept: FAMILY MEDICINE CLINIC | Facility: CLINIC | Age: 68
End: 2023-02-28
Payer: MEDICARE

## 2023-03-01 DIAGNOSIS — E03.9 ACQUIRED HYPOTHYROIDISM: ICD-10-CM

## 2023-03-01 RX ORDER — LEVOTHYROXINE SODIUM 137 UG/1
TABLET ORAL
Qty: 30 TABLET | Refills: 2 | Status: SHIPPED | OUTPATIENT
Start: 2023-03-01

## 2023-03-09 ENCOUNTER — OFFICE VISIT (OUTPATIENT)
Dept: SLEEP MEDICINE | Facility: HOSPITAL | Age: 68
End: 2023-03-09
Payer: MEDICARE

## 2023-03-09 VITALS
HEART RATE: 95 BPM | HEIGHT: 59 IN | BODY MASS INDEX: 39.72 KG/M2 | SYSTOLIC BLOOD PRESSURE: 140 MMHG | WEIGHT: 197 LBS | DIASTOLIC BLOOD PRESSURE: 73 MMHG | OXYGEN SATURATION: 96 %

## 2023-03-09 DIAGNOSIS — G47.19 EXCESSIVE DAYTIME SLEEPINESS: ICD-10-CM

## 2023-03-09 DIAGNOSIS — G47.33 OBSTRUCTIVE SLEEP APNEA SYNDROME: ICD-10-CM

## 2023-03-09 DIAGNOSIS — R06.83 SNORING: ICD-10-CM

## 2023-03-09 DIAGNOSIS — E66.9 OBESITY (BMI 30-39.9): Primary | ICD-10-CM

## 2023-03-09 PROBLEM — Z85.3 PERSONAL HISTORY OF BREAST CANCER: Status: ACTIVE | Noted: 2023-03-09

## 2023-03-09 PROCEDURE — 1160F RVW MEDS BY RX/DR IN RCRD: CPT | Performed by: INTERNAL MEDICINE

## 2023-03-09 PROCEDURE — 3078F DIAST BP <80 MM HG: CPT | Performed by: INTERNAL MEDICINE

## 2023-03-09 PROCEDURE — 1159F MED LIST DOCD IN RCRD: CPT | Performed by: INTERNAL MEDICINE

## 2023-03-09 PROCEDURE — 3077F SYST BP >= 140 MM HG: CPT | Performed by: INTERNAL MEDICINE

## 2023-03-09 PROCEDURE — 99204 OFFICE O/P NEW MOD 45 MIN: CPT | Performed by: INTERNAL MEDICINE

## 2023-03-09 NOTE — PROGRESS NOTES
Connie Lu is a 67 y.o. female.   Chief Complaint   Patient presents with   • Sleeping Problem       HPI     67 y.o. female seen in consultation at the request of Volodymyr Jose APRN for evaluation of the above.     She has a history of RADHA diagnosed approximately 10 years ago in [place].  She was given CPAP.  She used this for 5 years until undergoing a gastric sleeve surgery 5 years ago and lost significant amount of weight and she stopped using CPAP but she did not feel she needed it.  She went from a high of 267 pounds to a level of 177 pounds.  She is back up to 195 pounds currently.    She has nonrestorative sleep/daytime somnolence.  She averages 7 or 7/2 hours of sleep per night.  She will awaken multiple times.  She uses trazodone to help with sleep.    She does not have any RLS type symptoms currently.  She has had these in the past.  She was on Requip for period time but it seemed to make the problem worse.    [place] Scale is: 8/24    The patient's relevant past medical, surgical, family, and social history reviewed and updated in Epic as appropriate.    Current medications are:   Current Outpatient Medications:   •  Ascorbic Acid (VITAMIN C ER PO), Take  by mouth., Disp: , Rfl:   •  Cholecalciferol (VITAMIN D) 2000 units tablet, , Disp: , Rfl:   •  Cyanocobalamin (VITAMIN B-12) 500 MCG lozenge, , Disp: , Rfl:   •  lamoTRIgine (LaMICtal) 150 MG tablet, Take 1 tablet by mouth Daily., Disp: 30 tablet, Rfl: 1  •  levothyroxine (SYNTHROID, LEVOTHROID) 137 MCG tablet, TAKE 1 TABLET BY MOUTH EVERY DAY, Disp: 30 tablet, Rfl: 2  •  losartan (COZAAR) 100 MG tablet, Take 1 tablet by mouth Daily., Disp: 30 tablet, Rfl: 5  •  Multiple Vitamin (CALCIUM COMPLEX PO), Take  by mouth., Disp: , Rfl:   •  omeprazole (priLOSEC) 20 MG capsule, TAKE ONE CAPSULE BY MOUTH EVERY DAY, Disp: 30 capsule, Rfl: 2  •  traZODone (DESYREL) 50 MG tablet, Take 2 tablets by mouth Every Night., Disp: 60 tablet, Rfl: 1  •   "venlafaxine XR (EFFEXOR-XR) 150 MG 24 hr capsule, Take 1 capsule by mouth Daily., Disp: 30 capsule, Rfl: 5.    Review of Systems    Review of Systems  ROS documented in patient questionnaire ×14 systems.  Reviewed with patient.  Otherwise negative except as noted in HPI.    Physical Exam    Blood pressure 140/73, pulse 95, height 149.9 cm (59.02\"), weight 89.4 kg (197 lb), SpO2 96 %. Body mass index is 39.77 kg/m².    Physical Exam  Vitals and nursing note reviewed.   Constitutional:       Appearance: Normal appearance. She is well-developed.   HENT:      Head: Normocephalic and atraumatic.      Nose: Nose normal.      Mouth/Throat:      Mouth: Mucous membranes are moist.      Pharynx: Oropharynx is clear. No oropharyngeal exudate.      Comments: Class II airway  Eyes:      General: No scleral icterus.     Conjunctiva/sclera: Conjunctivae normal.   Neck:      Thyroid: No thyromegaly.      Trachea: No tracheal deviation.   Cardiovascular:      Rate and Rhythm: Normal rate and regular rhythm.      Heart sounds: No murmur heard.    No friction rub. No gallop.   Pulmonary:      Effort: Pulmonary effort is normal. No respiratory distress.      Breath sounds: No wheezing or rales.   Musculoskeletal:         General: No deformity. Normal range of motion.   Skin:     General: Skin is warm and dry.      Findings: No rash.   Neurological:      Mental Status: She is alert and oriented to person, place, and time.   Psychiatric:         Behavior: Behavior normal.         Thought Content: Thought content normal.         DATA:    Reviewed 12/13/2022 note from JAYANT Berger    Reviewed labs dated 2/23/2023    ASSESSMENT:    Problem List Items Addressed This Visit        Pulmonary Problems    Obstructive sleep apnea syndrome       Other    Obesity (BMI 30-39.9) - Primary   Other Visit Diagnoses     Excessive daytime sleepiness        Snoring              67-year-old female with a high likelihood of RADHA.  She sleeps alone and does " not know if she snores but does have a history of RADHA diagnosed in the past.  She was on CPAP for period of time but lost significant amounts of weight after gastric sleeve and stopped using it about 5 years ago.  She has gained some of that weight back but not all of it.  She notes nonrestorative sleep/daytime somnolence.  She warrants further evaluation for the presence of RADHA and potential retreatment.    PLAN:    1. Home sleep apnea testing is an appropriate initial diagnostic step in her case.  2. I discussed the diagnostic process as well as treatment options for obstructive sleep apnea if that is diagnosed.  3. I went over the long-term cardiovascular and metabolic risks of untreated obstructive sleep apnea.  4. I recommended continued long-term, healthy weight loss.  5. She was amenable to resuming CPAP therapy.  She will need a new machine as her current machine is over 10 years old.  6. Close sleep center follow-up.      I have reviewed the results of my evaluation and impression and discussed my recommendations in detail with the patient.    Signed by  Yazan Webster MD    March 9, 2023      CC: Masoud Martin MD Slyh, Aaron A, APRN

## 2023-03-15 RX ORDER — OMEPRAZOLE 20 MG/1
CAPSULE, DELAYED RELEASE ORAL
Qty: 30 CAPSULE | Refills: 2 | Status: SHIPPED | OUTPATIENT
Start: 2023-03-15

## 2023-04-27 ENCOUNTER — HOSPITAL ENCOUNTER (OUTPATIENT)
Dept: SLEEP MEDICINE | Facility: HOSPITAL | Age: 68
Discharge: HOME | End: 2023-04-27
Admitting: INTERNAL MEDICINE
Payer: MEDICARE

## 2023-04-27 VITALS — BODY MASS INDEX: 39.72 KG/M2 | HEIGHT: 59 IN | WEIGHT: 197 LBS

## 2023-04-27 DIAGNOSIS — G47.33 OBSTRUCTIVE SLEEP APNEA SYNDROME: ICD-10-CM

## 2023-04-27 DIAGNOSIS — G47.19 EXCESSIVE DAYTIME SLEEPINESS: ICD-10-CM

## 2023-04-27 DIAGNOSIS — R06.83 SNORING: ICD-10-CM

## 2023-04-27 PROCEDURE — 95806 SLEEP STUDY UNATT&RESP EFFT: CPT

## 2023-04-28 PROCEDURE — 95806 SLEEP STUDY UNATT&RESP EFFT: CPT | Performed by: INTERNAL MEDICINE

## 2023-05-03 ENCOUNTER — TELEPHONE (OUTPATIENT)
Dept: SLEEP MEDICINE | Facility: HOSPITAL | Age: 68
End: 2023-05-03
Payer: MEDICAID

## 2023-05-03 DIAGNOSIS — G47.33 OBSTRUCTIVE SLEEP APNEA SYNDROME: Primary | ICD-10-CM

## 2023-05-03 NOTE — TELEPHONE ENCOUNTER
"Caller: Connie Lu \"TK\"    Relationship: Self    Best call back number: [phone]    What is the best time to reach you: ANYTIME    Who are you requesting to speak with (clinical staff, provider,  specific staff member): CLINICAL STAFF    Do you know the name of the person who called: ROMAN    What was the call regarding: PT WASN'T SURE WHAT THE CALL WAS REGARDING, BUT WAS CALLING BACK FROM A MISSED CALL AND VM.    Do you require a callback: YES    "

## 2023-05-04 ENCOUNTER — TELEPHONE (OUTPATIENT)
Dept: SLEEP MEDICINE | Facility: CLINIC | Age: 68
End: 2023-05-04
Payer: MEDICAID

## 2023-05-23 ENCOUNTER — OFFICE VISIT (OUTPATIENT)
Dept: FAMILY MEDICINE CLINIC | Facility: CLINIC | Age: 68
End: 2023-05-23
Payer: MEDICAID

## 2023-05-23 VITALS
SYSTOLIC BLOOD PRESSURE: 158 MMHG | TEMPERATURE: 97.5 F | HEIGHT: 59 IN | RESPIRATION RATE: 18 BRPM | WEIGHT: 193 LBS | HEART RATE: 78 BPM | DIASTOLIC BLOOD PRESSURE: 98 MMHG | BODY MASS INDEX: 38.91 KG/M2

## 2023-05-23 DIAGNOSIS — T14.8XXA BRUISING: ICD-10-CM

## 2023-05-23 DIAGNOSIS — I10 ESSENTIAL HYPERTENSION: ICD-10-CM

## 2023-05-23 DIAGNOSIS — E03.9 ACQUIRED HYPOTHYROIDISM: ICD-10-CM

## 2023-05-23 DIAGNOSIS — F51.01 PRIMARY INSOMNIA: ICD-10-CM

## 2023-05-23 DIAGNOSIS — R45.89 DEPRESSED MOOD: Primary | ICD-10-CM

## 2023-05-23 DIAGNOSIS — E78.00 PURE HYPERCHOLESTEROLEMIA: ICD-10-CM

## 2023-05-23 DIAGNOSIS — E61.1 IRON DEFICIENCY: ICD-10-CM

## 2023-05-23 RX ORDER — LOSARTAN POTASSIUM 100 MG/1
100 TABLET ORAL DAILY
Qty: 30 TABLET | Refills: 5 | Status: SHIPPED | OUTPATIENT
Start: 2023-05-23

## 2023-05-23 RX ORDER — TRAZODONE HYDROCHLORIDE 100 MG/1
100-150 TABLET ORAL NIGHTLY
Qty: 45 TABLET | Refills: 2 | Status: SHIPPED | OUTPATIENT
Start: 2023-05-23

## 2023-05-23 RX ORDER — LEVOTHYROXINE SODIUM 137 UG/1
137 TABLET ORAL DAILY
Qty: 30 TABLET | Refills: 2 | Status: SHIPPED | OUTPATIENT
Start: 2023-05-23

## 2023-05-23 NOTE — PROGRESS NOTES
Chief Complaint  Bleeding/Bruising (Iron pill? ) and Anxiety (Talk about changing medication/)    Subjective          Connie Lu presents to [facility]     History of Present Illness    Depression  The patient reports that she had a breakdown today, 05/23/2023. She has been having breakdowns lately, so she is unsure if she needs a medication change. She is feeling more anxious and depressed with feelings of helplessness. She notes that she called in this morning, 05/23/2023, to inform her job that she was coming; however, she reports that she just wanted to die. She denies any plan to do anything to hurt herself; however, the thoughts have crossed her mind as to what to do and she had never done that before. What stopped her from not wanting to die is knowing that it is not good. Her boss is a licensed counselor. She works for [employer], and she works for the senior citizens. She is on Effexor 150 mg daily, which was working previously; however, she is unsure when this has changed. It was bad before her mother passed away, and it has just gotten worse. She is no longer taking the lamotrigine (Lamictal), and she is only taking the Effexor. JAYANT Berger is the one who prescribed her the lamotrigine (Lamictal) to see if she can be taken off the Effexor. She could not remember if the lamotrigine (Lamictal) was effective when she was taking it back in 01/2023. She is taking 2 trazodones, which have not been completely effective. She takes ZzzQuil Nighttime as well. She could not fall asleep and stay asleep without any medications helping. The trazodone and ZzzQuil are effective for her to be able to sleep. She is going to use the CPAP again on Thursday, 05/25/2023, which needed to be updated anyway. Overall, she is just not feeling good with no plan to do anything.    She told people at [employer] that she had struggles with thoughts of harming  herself, so the people at [facility] are somehow watching her. She does not have anybody with her at home, except for a dog and a cat. She does not have any weapons at home. She does not feel that it is getting out of control. She feels that she probably needs counseling, and she probably needs a different medication. She ended up seeing JAYANT Berger more than she did with the counselor she previously saw. She feels comfortable with them; however, she just had trouble remembering the appointments. She told her boss what was going on who told her that she needs to take care of herself by remembering to go to her scheduled appointments. Since her mother , she feels that she does not have a family. She has a niece and a brother who both live 1 mile away from her; however, no one visits her. They do not call her. They text her to wish her a happy birthday. Her niece does not talk to her. She had sent her niece letters and her niece will not respond. Her niece blocked her from [employer] [employer]. Her niece also changed her number. She states that her niece acts as if she killed someone. She states that her youngest niece does not talk to her as well; however, she did not talk to her as much either.    Bruising  The patient is unsure of the bruising she has been having in her body. She also has one bruising on her leg, which she is also unsure where it came from. She states that the bruising has been going on for a month. The bruising looked worse last week, the week of 05/15/2023. She notes that she works 7:45 AM to 4:15 PM, Monday to Friday. She is already retired; however, she had to go back to work because once her mother passed away, she could not pay the bills without her mother's income as well. In order to alleviate that, she sold the house. She was also needing a car, so she spent a part of that money to have a reliable car so she would not have to call her brother about anything. She notes that Friday, 2023,  "would probably be a better day for laboratory tests.    Bleeding  The patient had a colonoscopy yesterday, 05/23/2023, and she was informed that everything was normal. Polyps were not seen then. It was the first time she has ever gone home bleeding profusely with significant amount of clotting after the colonoscopy yesterday, 05/23/2023. She believes that it could be from fissure. It has improved currently, however. She took Imodium to stop her from having a bowel movement. She notes that she barely experiences drowsiness, which is why she is taking the ZzzQuil.    Hypertension  The patient also needs a refill on her losartan and Synthroid. She is almost out of everything, except omeprazole. Her blood pressure was still mildly elevated today, 05/23/2023. She denies any blood in her urine.    Ankle swelling  The patient reports that her ankles are starting to swell in the last 2 weeks. She is on her feet for long periods of time.    Review of Systems   Psychiatric/Behavioral: Positive for depressed mood.        Objective       Vital Signs:   /98   Pulse 78   Temp 97.5 °F (36.4 °C)   Resp 18   Ht 149.9 cm (59\")   Wt 87.5 kg (193 lb)   BMI 38.98 kg/m²     Physical Exam  Vitals and nursing note reviewed.   Constitutional:       Appearance: She is well-developed.   HENT:      Head: Normocephalic and atraumatic.      Right Ear: Hearing and external ear normal.      Left Ear: Hearing and external ear normal.   Eyes:      Conjunctiva/sclera: Conjunctivae normal.      Pupils: Pupils are equal, round, and reactive to light.   Cardiovascular:      Rate and Rhythm: Normal rate and regular rhythm.      Heart sounds: Normal heart sounds. No murmur heard.    No friction rub.   Pulmonary:      Effort: Pulmonary effort is normal. No respiratory distress.      Breath sounds: Normal breath sounds. No wheezing or rales.   Musculoskeletal:      Right lower leg: No edema.      Left lower leg: No edema.   Skin:     General: " Skin is warm.   Neurological:      Mental Status: She is alert.   Psychiatric:         Mood and Affect: Mood is depressed. Affect is tearful.         Behavior: Behavior is cooperative.         Thought Content: Thought content does not include suicidal plan.         Judgment: Judgment normal.          Result Review :                     Assessment and Plan    Diagnoses and all orders for this visit:    1. Depressed mood (Primary)    2. Primary insomnia  -     traZODone (DESYREL) 100 MG tablet; Take 1-1.5 tablets by mouth Every Night. As needed for sleep  Dispense: 45 tablet; Refill: 2    3. Bruising  -     CBC & Differential; Future  -     Comprehensive Metabolic Panel; Future  -     Protime-INR; Future  -     APTT; Future    4. Acquired hypothyroidism  -     TSH; Future  -     levothyroxine (SYNTHROID, LEVOTHROID) 137 MCG tablet; Take 1 tablet by mouth Daily.  Dispense: 30 tablet; Refill: 2    5. Iron deficiency  -     CBC & Differential; Future  -     Iron and TIBC; Future  -     Ferritin; Future    6. Pure hypercholesterolemia  -     Comprehensive Metabolic Panel; Future  -     Lipid Panel With / Chol / HDL Ratio; Future    7. Essential hypertension  -     losartan (COZAAR) 100 MG tablet; Take 1 tablet by mouth Daily.  Dispense: 30 tablet; Refill: 5          DISCUSSION  1. Depressed mood  - She is currently only on Effexor  mg daily. She has expressed some suicidal thoughts, but no plan and no active plan at this time. She agrees that she would not do that and that she would call our office to seek advice if that was to occur. I have contacted Behavioral Health, and they will be calling her to be seen in the next 1 to 2 weeks. She will call sooner if worsens. Trazodone was changed from 50 mg to 100 mg, and she will take 1 to 1.5 at night to help with sleeping.    2. Bruising  - With history of iron deficiency, recheck laboratory tests as noted.  Check PT and PTT as well    3. Hypothyroidism  - We will  recheck thyroid stimulating hormone as well.    4. Hyperlipidemia  - Check comprehensive metabolic panel and lipid panel.    5. Hypertension  - Blood pressure is elevated, but she is having anxiety today. It has come down after sitting here for a little while. We will continue to monitor.    Follow-up pending Behavioral Health evaluation and blood work.        Follow Up   Return for follow up depends on review of labs and testing.    Patient was given instructions and counseling regarding her condition or for health maintenance advice. Please see specific information pulled into the AVS if appropriate.       Masoud Martin MD       Transcribed from ambient dictation for Masoud Martin MD by Gail Arias.  05/23/23   20:51 EDT    Patient or patient representative verbalized consent to the visit recording.  I have personally performed the services described in this document as transcribed by the above individual, and it is both accurate and complete.  Masoud Martin MD  5/24/2023  16:49 EDT

## 2023-05-26 ENCOUNTER — LAB (OUTPATIENT)
Dept: FAMILY MEDICINE CLINIC | Facility: CLINIC | Age: 68
End: 2023-05-26

## 2023-05-26 DIAGNOSIS — E61.1 IRON DEFICIENCY: ICD-10-CM

## 2023-05-26 DIAGNOSIS — E78.00 PURE HYPERCHOLESTEROLEMIA: ICD-10-CM

## 2023-05-26 DIAGNOSIS — E03.9 ACQUIRED HYPOTHYROIDISM: ICD-10-CM

## 2023-05-26 DIAGNOSIS — T14.8XXA BRUISING: ICD-10-CM

## 2023-05-26 LAB
ALBUMIN SERPL-MCNC: 3.8 G/DL (ref 3.5–5.2)
ALBUMIN/GLOB SERPL: 2.1 G/DL
ALP SERPL-CCNC: 63 U/L (ref 39–117)
ALT SERPL-CCNC: 21 U/L (ref 1–33)
APTT PPP: 27.7 SECONDS (ref 22–39)
AST SERPL-CCNC: 18 U/L (ref 1–32)
BASOPHILS # BLD AUTO: 0.03 10*3/MM3 (ref 0–0.2)
BASOPHILS NFR BLD AUTO: 0.5 % (ref 0–1.5)
BILIRUB SERPL-MCNC: 0.4 MG/DL (ref 0–1.2)
BUN SERPL-MCNC: 23 MG/DL (ref 8–23)
BUN/CREAT SERPL: 29.9 (ref 7–25)
CALCIUM SERPL-MCNC: 9.6 MG/DL (ref 8.6–10.5)
CHLORIDE SERPL-SCNC: 107 MMOL/L (ref 98–107)
CHOLEST SERPL-MCNC: 190 MG/DL (ref 0–200)
CHOLEST/HDLC SERPL: 2.6 {RATIO}
CO2 SERPL-SCNC: 30.3 MMOL/L (ref 22–29)
CREAT SERPL-MCNC: 0.77 MG/DL (ref 0.57–1)
EGFRCR SERPLBLD CKD-EPI 2021: 85.2 ML/MIN/1.73
EOSINOPHIL # BLD AUTO: 0.14 10*3/MM3 (ref 0–0.4)
EOSINOPHIL NFR BLD AUTO: 2.2 % (ref 0.3–6.2)
ERYTHROCYTE [DISTWIDTH] IN BLOOD BY AUTOMATED COUNT: 14.2 % (ref 12.3–15.4)
FERRITIN SERPL-MCNC: 90.8 NG/ML (ref 13–150)
GLOBULIN SER CALC-MCNC: 1.8 GM/DL
GLUCOSE SERPL-MCNC: 83 MG/DL (ref 65–99)
HCT VFR BLD AUTO: 38.9 % (ref 34–46.6)
HDLC SERPL-MCNC: 73 MG/DL (ref 40–60)
HGB BLD-MCNC: 13.4 G/DL (ref 12–15.9)
IMM GRANULOCYTES # BLD AUTO: 0.01 10*3/MM3 (ref 0–0.05)
IMM GRANULOCYTES NFR BLD AUTO: 0.2 % (ref 0–0.5)
INR PPP: 1.02 (ref 0.89–1.12)
IRON SATN MFR SERPL: 24 % (ref 20–50)
IRON SERPL-MCNC: 89 MCG/DL (ref 37–145)
LDLC SERPL CALC-MCNC: 108 MG/DL (ref 0–100)
LYMPHOCYTES # BLD AUTO: 2.05 10*3/MM3 (ref 0.7–3.1)
LYMPHOCYTES NFR BLD AUTO: 32.5 % (ref 19.6–45.3)
MCH RBC QN AUTO: 31.8 PG (ref 26.6–33)
MCHC RBC AUTO-ENTMCNC: 34.4 G/DL (ref 31.5–35.7)
MCV RBC AUTO: 92.4 FL (ref 79–97)
MONOCYTES # BLD AUTO: 0.67 10*3/MM3 (ref 0.1–0.9)
MONOCYTES NFR BLD AUTO: 10.6 % (ref 5–12)
NEUTROPHILS # BLD AUTO: 3.4 10*3/MM3 (ref 1.7–7)
NEUTROPHILS NFR BLD AUTO: 54 % (ref 42.7–76)
NRBC BLD AUTO-RTO: 0 /100 WBC (ref 0–0.2)
PLATELET # BLD AUTO: 225 10*3/MM3 (ref 140–450)
POTASSIUM SERPL-SCNC: 4.6 MMOL/L (ref 3.5–5.2)
PROT SERPL-MCNC: 5.6 G/DL (ref 6–8.5)
PROTHROMBIN TIME: 13.5 SECONDS (ref 12.2–14.5)
RBC # BLD AUTO: 4.21 10*6/MM3 (ref 3.77–5.28)
SODIUM SERPL-SCNC: 143 MMOL/L (ref 136–145)
TIBC SERPL-MCNC: 373 MCG/DL
TRIGL SERPL-MCNC: 44 MG/DL (ref 0–150)
TSH SERPL DL<=0.005 MIU/L-ACNC: 4.58 UIU/ML (ref 0.27–4.2)
UIBC SERPL-MCNC: 284 MCG/DL (ref 112–346)
VLDLC SERPL CALC-MCNC: 9 MG/DL (ref 5–40)
WBC # BLD AUTO: 6.3 10*3/MM3 (ref 3.4–10.8)

## 2023-05-30 DIAGNOSIS — E03.9 ACQUIRED HYPOTHYROIDISM: ICD-10-CM

## 2023-05-30 RX ORDER — LEVOTHYROXINE SODIUM 0.15 MG/1
150 TABLET ORAL DAILY
Qty: 30 TABLET | Refills: 3 | Status: SHIPPED | OUTPATIENT
Start: 2023-05-30

## 2023-07-31 ENCOUNTER — HOSPITAL ENCOUNTER (OUTPATIENT)
Dept: BONE DENSITY | Facility: HOSPITAL | Age: 68
Discharge: HOME | End: 2023-07-31
Admitting: FAMILY MEDICINE
Payer: MEDICAID

## 2023-07-31 DIAGNOSIS — Z78.0 ASYMPTOMATIC MENOPAUSAL STATE: ICD-10-CM

## 2023-07-31 DIAGNOSIS — Z13.820 OSTEOPOROSIS SCREENING: ICD-10-CM

## 2023-07-31 PROCEDURE — 77080 DXA BONE DENSITY AXIAL: CPT

## 2023-09-15 ENCOUNTER — TELEPHONE (OUTPATIENT)
Dept: FAMILY MEDICINE CLINIC | Facility: CLINIC | Age: 68
End: 2023-09-15
Payer: MEDICARE

## 2023-09-15 ENCOUNTER — OFFICE VISIT (OUTPATIENT)
Dept: FAMILY MEDICINE CLINIC | Facility: CLINIC | Age: 68
End: 2023-09-15
Payer: MEDICARE

## 2023-09-15 VITALS
TEMPERATURE: 97.5 F | HEIGHT: 59 IN | HEART RATE: 89 BPM | SYSTOLIC BLOOD PRESSURE: 145 MMHG | BODY MASS INDEX: 36.12 KG/M2 | DIASTOLIC BLOOD PRESSURE: 82 MMHG | RESPIRATION RATE: 20 BRPM | OXYGEN SATURATION: 96 % | WEIGHT: 179.2 LBS

## 2023-09-15 DIAGNOSIS — J06.9 ACUTE URI: Primary | ICD-10-CM

## 2023-09-15 PROCEDURE — 99213 OFFICE O/P EST LOW 20 MIN: CPT | Performed by: FAMILY MEDICINE

## 2023-09-15 PROCEDURE — 3077F SYST BP >= 140 MM HG: CPT | Performed by: FAMILY MEDICINE

## 2023-09-15 PROCEDURE — 3079F DIAST BP 80-89 MM HG: CPT | Performed by: FAMILY MEDICINE

## 2023-09-15 RX ORDER — DEXTROMETHORPHAN HYDROBROMIDE AND PROMETHAZINE HYDROCHLORIDE 15; 6.25 MG/5ML; MG/5ML
5 SYRUP ORAL 4 TIMES DAILY PRN
Qty: 120 ML | Refills: 0 | Status: SHIPPED | OUTPATIENT
Start: 2023-09-15

## 2023-09-15 RX ORDER — METHYLPREDNISOLONE 4 MG/1
TABLET ORAL
COMMUNITY
Start: 2023-09-05

## 2023-09-15 NOTE — PROGRESS NOTES
"Chief Complaint   Patient presents with    sinus congestion, cough & R ear congestion     Started on Wed.  /  Pt has had a negative home Covid test        Subjective      Connie Lu is a 68 y.o. who presents for 2 days of nasal congestion, right ear discomfort, cough. No fevers, chills, or dyspnea    Objective   Vital Signs:  /82   Pulse 89   Temp 97.5 °F (36.4 °C)   Resp 20   Ht 148.9 cm (58.62\")   Wt 81.3 kg (179 lb 3.2 oz)   SpO2 96%   BMI 36.66 kg/m²     Physical Exam  Constitutional:       Appearance: Normal appearance.   HENT:      Head: Normocephalic and atraumatic.      Right Ear: Tympanic membrane and ear canal normal.      Left Ear: Tympanic membrane and ear canal normal.      Nose: Nose normal.      Mouth/Throat:      Mouth: Mucous membranes are moist.      Pharynx: Oropharynx is clear.   Eyes:      Conjunctiva/sclera: Conjunctivae normal.   Cardiovascular:      Rate and Rhythm: Normal rate and regular rhythm.      Heart sounds: Normal heart sounds. No murmur heard.  Pulmonary:      Effort: Pulmonary effort is normal. No respiratory distress.      Breath sounds: Normal breath sounds.   Abdominal:      General: Abdomen is flat. Bowel sounds are normal. There is no distension.      Palpations: Abdomen is soft.      Tenderness: There is no abdominal tenderness.   Musculoskeletal:      Cervical back: Normal range of motion and neck supple. No tenderness.   Lymphadenopathy:      Cervical: No cervical adenopathy.   Skin:     General: Skin is warm and dry.   Neurological:      Mental Status: She is alert.   Psychiatric:         Mood and Affect: Mood normal.        Result Review                     Assessment and Plan  Diagnoses and all orders for this visit:    1. Acute URI (Primary)  -     promethazine-dextromethorphan (PROMETHAZINE-DM) 6.25-15 MG/5ML syrup; Take 5 mL by mouth 4 (Four) Times a Day As Needed for Cough.  Dispense: 120 mL; Refill: 0            Follow Up  No follow-ups on " file.  Patient was given instructions and counseling regarding her condition or for health maintenance advice. Please see specific information pulled into the AVS if appropriate.

## 2023-09-15 NOTE — TELEPHONE ENCOUNTER
"Caller: Connie Lu \"TK\"     Relationship:    Best call back number: [phone]*    What is your medical concern?     EARACHE  CONGESTION   BLOWING OUT YELLOW STUFF  COUGH    How long has this issue been going on?     TWO DAYS    [facility] - [address] - [phone] [facility] [phone]   "

## 2023-09-18 ENCOUNTER — TELEPHONE (OUTPATIENT)
Dept: FAMILY MEDICINE CLINIC | Facility: CLINIC | Age: 68
End: 2023-09-18
Payer: MEDICARE

## 2023-09-18 NOTE — TELEPHONE ENCOUNTER
Z-Justin is not necessary.  Continue to use to cough medications in addition to plenty of fluids.  Cough drops may help as well.  If she has fevers or gets short of breath she should make an appointment to return for evaluation

## 2023-09-18 NOTE — TELEPHONE ENCOUNTER
Patient had appointment with Dr. Bryant on 9/15/23. Cough syrup was called in but she is wondering if she can get a ZPAK called in. She still doesn't feel better and thinks this will help.

## 2023-09-19 ENCOUNTER — TELEPHONE (OUTPATIENT)
Dept: FAMILY MEDICINE CLINIC | Facility: CLINIC | Age: 68
End: 2023-09-19

## 2023-09-19 NOTE — TELEPHONE ENCOUNTER
Pt stated not feeling well still-needs to make appt ASAP with Dr Martin-see messages-pt aware front to call and schedule appt

## 2023-09-19 NOTE — TELEPHONE ENCOUNTER
"Caller: Connie Lu \"TK\"    Relationship: Self    Best call back number: [phone]    What medication are you requesting: MAREK VEGA    What are your current symptoms: GREEN GUNK    How long have you been experiencing symptoms: A FEW     If a prescription is needed, what is your preferred pharmacy and phone number:  [facility] - [place], [place] - [address] - [phone]  - [phone]      Additional notes:        "

## 2023-09-21 ENCOUNTER — OFFICE VISIT (OUTPATIENT)
Dept: FAMILY MEDICINE CLINIC | Facility: CLINIC | Age: 68
End: 2023-09-21
Payer: MEDICARE

## 2023-09-21 VITALS
TEMPERATURE: 97.5 F | HEIGHT: 59 IN | BODY MASS INDEX: 36.12 KG/M2 | WEIGHT: 179.2 LBS | OXYGEN SATURATION: 97 % | HEART RATE: 76 BPM | SYSTOLIC BLOOD PRESSURE: 144 MMHG | RESPIRATION RATE: 18 BRPM | DIASTOLIC BLOOD PRESSURE: 86 MMHG

## 2023-09-21 DIAGNOSIS — J20.9 ACUTE BRONCHITIS, UNSPECIFIED ORGANISM: ICD-10-CM

## 2023-09-21 DIAGNOSIS — J01.40 ACUTE NON-RECURRENT PANSINUSITIS: Primary | ICD-10-CM

## 2023-09-21 PROCEDURE — 3077F SYST BP >= 140 MM HG: CPT | Performed by: FAMILY MEDICINE

## 2023-09-21 PROCEDURE — 1159F MED LIST DOCD IN RCRD: CPT | Performed by: FAMILY MEDICINE

## 2023-09-21 PROCEDURE — 3079F DIAST BP 80-89 MM HG: CPT | Performed by: FAMILY MEDICINE

## 2023-09-21 PROCEDURE — 99213 OFFICE O/P EST LOW 20 MIN: CPT | Performed by: FAMILY MEDICINE

## 2023-09-21 PROCEDURE — 1160F RVW MEDS BY RX/DR IN RCRD: CPT | Performed by: FAMILY MEDICINE

## 2023-09-21 RX ORDER — LAMOTRIGINE 100 MG/1
100 TABLET ORAL DAILY
COMMUNITY
Start: 2023-09-05 | End: 2023-09-21

## 2023-09-21 RX ORDER — AZITHROMYCIN 250 MG/1
TABLET, FILM COATED ORAL
Qty: 6 TABLET | Refills: 0 | Status: SHIPPED | OUTPATIENT
Start: 2023-09-21

## 2023-09-21 RX ORDER — METHYLPREDNISOLONE 4 MG/1
TABLET ORAL
Qty: 21 TABLET | Refills: 0 | Status: SHIPPED | OUTPATIENT
Start: 2023-09-21

## 2023-09-21 NOTE — PROGRESS NOTES
Chief Complaint  Sinusitis (Here last Friday, has sinus drainage, congestion, cough, out of cough syrup and has gotten worse since last visit.)    Subjective          Connie Lu presents to [facility]  URI   This is a new problem. The current episode started 1 to 4 weeks ago (1-2 weeks). There has been no fever. Associated symptoms include congestion, coughing, ear pain, headaches and sinus pain. Pertinent negatives include no sore throat. Treatments tried: promethazine DM cough syrup. The treatment provided no relief.   Cough  This is a new problem. The current episode started 1 to 4 weeks ago. The problem has been gradually worsening. The problem occurs constantly. The cough is Productive of purulent sputum. Associated symptoms include ear pain and headaches. Pertinent negatives include no sore throat or shortness of breath. She has tried prescription cough suppressant for the symptoms. The treatment provided no relief.   Negative at home COVID test when symptoms initially started last week.    The following portions of the patient's history were reviewed and updated as appropriate: allergies, current medications, past family history, past medical history, past social history, past surgical history, and problem list.    Objective      Physical Exam  Vitals reviewed.   HENT:      Right Ear: Tympanic membrane, ear canal and external ear normal.      Left Ear: Tympanic membrane, ear canal and external ear normal.      Nose: Congestion present.      Right Sinus: Maxillary sinus tenderness and frontal sinus tenderness present.      Left Sinus: Maxillary sinus tenderness and frontal sinus tenderness present.   Cardiovascular:      Rate and Rhythm: Normal rate.      Heart sounds: Normal heart sounds.   Pulmonary:      Effort: Pulmonary effort is normal.      Breath sounds: Normal breath sounds. No wheezing or rhonchi.      Comments: Coarse breath sounds  Neurological:       Mental Status: She is alert.      Result Review :                Assessment and Plan    Diagnoses and all orders for this visit:    1. Acute non-recurrent pansinusitis (Primary)  -     azithromycin (Zithromax Z-Justin) 250 MG tablet; Take 2 tablets by mouth on day 1, then 1 tablet daily on days 2-5  Dispense: 6 tablet; Refill: 0  -     methylPREDNISolone (MEDROL) 4 MG dose pack; Take as directed on package instructions.  Dispense: 21 tablet; Refill: 0    2. Acute bronchitis, unspecified organism  -     azithromycin (Zithromax Z-Justin) 250 MG tablet; Take 2 tablets by mouth on day 1, then 1 tablet daily on days 2-5  Dispense: 6 tablet; Refill: 0  -     methylPREDNISolone (MEDROL) 4 MG dose pack; Take as directed on package instructions.  Dispense: 21 tablet; Refill: 0    Follow up if symptoms don't resolve.     Follow Up   Return if symptoms worsen or fail to improve.  Patient was given instructions and counseling regarding her condition or for health maintenance advice. Please see specific information pulled into the AVS if appropriate.

## 2023-09-27 ENCOUNTER — TELEPHONE (OUTPATIENT)
Dept: FAMILY MEDICINE CLINIC | Facility: CLINIC | Age: 68
End: 2023-09-27
Payer: MEDICARE

## 2023-09-27 NOTE — TELEPHONE ENCOUNTER
Patient returned call from yesterday. Didn't see any encounters other than Dr. Martin asking if she wants her prescriptions through [facility]. She said she does want those sent through [facility].

## 2023-09-28 DIAGNOSIS — F51.01 PRIMARY INSOMNIA: ICD-10-CM

## 2023-09-28 DIAGNOSIS — I10 ESSENTIAL HYPERTENSION: ICD-10-CM

## 2023-09-28 RX ORDER — TRAZODONE HYDROCHLORIDE 100 MG/1
100-150 TABLET ORAL NIGHTLY
Qty: 135 TABLET | Refills: 1 | Status: SHIPPED | OUTPATIENT
Start: 2023-09-28

## 2023-09-28 RX ORDER — LOSARTAN POTASSIUM 100 MG/1
100 TABLET ORAL DAILY
Qty: 90 TABLET | Refills: 1 | Status: SHIPPED | OUTPATIENT
Start: 2023-09-28

## 2023-09-28 RX ORDER — OMEPRAZOLE 20 MG/1
20 CAPSULE, DELAYED RELEASE ORAL DAILY
Qty: 90 CAPSULE | Refills: 1 | Status: SHIPPED | OUTPATIENT
Start: 2023-09-28

## 2023-10-02 DIAGNOSIS — F41.9 ANXIETY: ICD-10-CM

## 2023-10-02 DIAGNOSIS — E03.9 ACQUIRED HYPOTHYROIDISM: ICD-10-CM

## 2023-10-02 RX ORDER — VENLAFAXINE HYDROCHLORIDE 150 MG/1
150 CAPSULE, EXTENDED RELEASE ORAL DAILY
Qty: 90 CAPSULE | Refills: 0 | Status: SHIPPED | OUTPATIENT
Start: 2023-10-02

## 2023-10-02 RX ORDER — LEVOTHYROXINE SODIUM 0.15 MG/1
150 TABLET ORAL DAILY
Qty: 90 TABLET | Refills: 0 | Status: SHIPPED | OUTPATIENT
Start: 2023-10-02

## 2023-10-27 DIAGNOSIS — F51.01 PRIMARY INSOMNIA: ICD-10-CM

## 2023-10-27 RX ORDER — OMEPRAZOLE 20 MG/1
20 CAPSULE, DELAYED RELEASE ORAL DAILY
Qty: 90 CAPSULE | Refills: 1 | OUTPATIENT
Start: 2023-10-27

## 2023-10-27 RX ORDER — TRAZODONE HYDROCHLORIDE 100 MG/1
100-150 TABLET ORAL NIGHTLY
Qty: 135 TABLET | Refills: 1 | OUTPATIENT
Start: 2023-10-27

## 2023-10-27 NOTE — TELEPHONE ENCOUNTER
Incoming Refill Request      Medication requested (name and dose):     omeprazole (priLOSEC) 20 MG capsule   traZODone (DESYREL) 100 MG tablet     Pharmacy where request should be sent:     [place] [phone] [place] [phone]        Hanny Hudson Rep  10/27/23, 09:42 EDT

## 2023-11-07 DIAGNOSIS — F51.01 PRIMARY INSOMNIA: ICD-10-CM

## 2023-11-07 RX ORDER — OMEPRAZOLE 20 MG/1
20 CAPSULE, DELAYED RELEASE ORAL DAILY
Qty: 90 CAPSULE | Refills: 1 | OUTPATIENT
Start: 2023-11-07

## 2023-11-07 RX ORDER — TRAZODONE HYDROCHLORIDE 100 MG/1
100-150 TABLET ORAL NIGHTLY
Qty: 135 TABLET | Refills: 1 | OUTPATIENT
Start: 2023-11-07

## 2023-11-07 NOTE — TELEPHONE ENCOUNTER
"Caller: Connie Lu \"TK\"    Relationship: Self    Best call back number: [phone]     Requested Prescriptions:   Requested Prescriptions     Pending Prescriptions Disp Refills    omeprazole (priLOSEC) 20 MG capsule 90 capsule 1     Sig: Take 1 capsule by mouth Daily.    traZODone (DESYREL) 100 MG tablet 135 tablet 1     Sig: Take 1-1.5 tablets by mouth Every Night. As needed for sleep        Pharmacy where request should be sent: [place] [phone] [place] [phone] [facility]     Last office visit with prescribing clinician: 5/23/2023   Last telemedicine visit with prescribing clinician: Visit date not found   Next office visit with prescribing clinician: 11/21/2023     Additional details provided by patient:     Does the patient have less than a 3 day supply:  [x] Yes  [] No    Would you like a call back once the refill request has been completed: [] Yes [x] No    If the office needs to give you a call back, can they leave a voicemail: [] Yes [x] No    Hanny Pulido Rep   11/07/23 15:02 EST   "

## 2023-11-10 DIAGNOSIS — F51.01 PRIMARY INSOMNIA: ICD-10-CM

## 2023-11-10 RX ORDER — OMEPRAZOLE 20 MG/1
20 CAPSULE, DELAYED RELEASE ORAL DAILY
Qty: 90 CAPSULE | Refills: 1 | Status: SHIPPED | OUTPATIENT
Start: 2023-11-10

## 2023-11-10 RX ORDER — TRAZODONE HYDROCHLORIDE 100 MG/1
100-150 TABLET ORAL NIGHTLY
Qty: 135 TABLET | Refills: 1 | Status: SHIPPED | OUTPATIENT
Start: 2023-11-10

## 2023-11-21 ENCOUNTER — OFFICE VISIT (OUTPATIENT)
Dept: FAMILY MEDICINE CLINIC | Facility: CLINIC | Age: 68
End: 2023-11-21
Payer: MEDICARE

## 2023-11-21 VITALS
TEMPERATURE: 97.1 F | WEIGHT: 177 LBS | SYSTOLIC BLOOD PRESSURE: 132 MMHG | HEIGHT: 59 IN | HEART RATE: 80 BPM | OXYGEN SATURATION: 98 % | DIASTOLIC BLOOD PRESSURE: 94 MMHG | RESPIRATION RATE: 14 BRPM | BODY MASS INDEX: 35.68 KG/M2

## 2023-11-21 DIAGNOSIS — I10 ESSENTIAL HYPERTENSION: ICD-10-CM

## 2023-11-21 DIAGNOSIS — R73.9 HYPERGLYCEMIA: ICD-10-CM

## 2023-11-21 DIAGNOSIS — Z23 NEED FOR PNEUMOCOCCAL VACCINATION: ICD-10-CM

## 2023-11-21 DIAGNOSIS — J06.9 PROTRACTED URI: Primary | ICD-10-CM

## 2023-11-21 DIAGNOSIS — F51.01 PRIMARY INSOMNIA: ICD-10-CM

## 2023-11-21 DIAGNOSIS — E03.9 ACQUIRED HYPOTHYROIDISM: ICD-10-CM

## 2023-11-21 DIAGNOSIS — F41.9 ANXIETY: ICD-10-CM

## 2023-11-21 DIAGNOSIS — G47.33 OSA ON CPAP: ICD-10-CM

## 2023-11-21 RX ORDER — AZITHROMYCIN 250 MG/1
TABLET, FILM COATED ORAL
Qty: 6 TABLET | Refills: 0 | Status: SHIPPED | OUTPATIENT
Start: 2023-11-21

## 2023-11-21 NOTE — LETTER
[facility]  Vaccine Consent Form    Patient Name:  Connie Lu  Patient :  1955     Vaccine(s) Ordered    Pneumococcal Conjugate Vaccine 20-Valent (PCV20)        Screening Checklist  The following questions should be completed prior to vaccination. If you answer “yes” to any question, it does not necessarily mean you should not be vaccinated. It just means we may need to clarify or ask more questions. If a question is unclear, please ask your healthcare provider to explain it.    Yes No   Any fever or moderate to severe illness today (mild illness and/or antibiotic treatment are not contraindications)?     Do you have a history of a serious reaction to any previous vaccinations, such as anaphylaxis, encephalopathy within 7 days, Guillain-Okaton syndrome within 6 weeks, seizure?     Have you received any live vaccine(s) (e.g MMR, YANDY) or any other vaccines in the last month (to ensure duplicate doses aren't given)?     Do you have an anaphylactic allergy to latex (DTaP, DTaP-IPV, Hep A, Hep B, MenB, RV, Td, Tdap), baker’s yeast (Hep B, HPV), polysorbates (RSV, nirsevimab, PCV 20, Rotavirrus, Tdap, Shingrix), or gelatin (YANDY, MMR)?     Do you have an anaphylactic allergy to neomycin (Rabies, YANDY, MMR, IPV, Hep A), polymyxin B (IPV), or streptomycin (IPV)?      Any cancer, leukemia, AIDS, or other immune system disorder? (YANDY, MMR, RV)     Do you have a parent, brother, or sister with an immune system problem (if immune competence of vaccine recipient clinically verified, can proceed)? (MMR, YANDY)     Any recent steroid treatments for >2 weeks, chemotherapy, or radiation treatment? (YANDY, MMR)     Have you received antibody-containing blood transfusions or IVIG in the past 11 months (recommended interval is dependent on product)? (MMR, YANDY)     Have you taken antiviral drugs (acyclovir, famciclovir, valacyclovir for YANDY) in the last 24 or 48 hours, respectively?      Are you pregnant or planning to  become pregnant within 1 month? (YANDY, MMR, HPV, IPV, MenB, Abrexvy; For Hep B- refer to Engerix-B; For RSV - Abrysvo is indicated for 32-36 weeks of pregnancy from September to January)     For infants, have you ever been told your child has had intussusception or a medical emergency involving obstruction of the intestine (Rotavirus)? If not for an infant, can skip this question.         *Ordering Physician/APC should be consulted if “yes” is checked by the patient or guardian above.      I have received, read, and understand the Vaccine Information Statement (VIS) for each vaccine ordered above.  I have considered my health status as well as the health status of my close contacts.  I have taken the opportunity to discuss my vaccine questions with my health care provider.   I have requested that the ordered vaccine(s) be given to me.  I understand the benefits and risks of the vaccines.  I understand that I should remain in the clinic for 15 minutes after receiving the vaccine(s).  _________________________________________________________  Signature of Patient or Parent/Legal Guardian ____________________  Date

## 2023-11-21 NOTE — PROGRESS NOTES
Chief Complaint  6mo f/u     Subjective          Connie Lu presents to [facility]  History of Present Illness    Connie Lu is a 68-year-old female who presents today for a routine follow-up.    The patient has had a head cold for the past 2 weeks. She went to [facility] on 11/13/2023 and was given prednisone. She needs a Z-Justin placed. She is coughing up light yellow phlegm. She has a bad headache right now. She denies any teeth pain, fever, or chills. She had a COVID-19 test and it was negative. She denies any wheezing.    The patient's diastolic blood pressure is slightly elevated today. She checked her blood pressure at home one day this week and it was 154/94 mmHg. She has finished the prednisone.    The patient is still on her thyroid medication. Her energy level has been okay.    The patient is finally back on her CPAP. She is sleeping well on the trazodone.    The patient is not diabetic. She has been borderline in the past.    The patient's mood has been fine. She is in a serious relationship and is very happy. This has been going on for a couple of months.    The patient is having trouble trying to exercise because she has a bad right knee and left shoulder. She is seeing Dr. Webster on 11/22/2023.    The patient has had her flu vaccine at a health fair. She has not had her pneumonia vaccine. She was taking a probiotic, but she had to not take it every day. It made her go to the bathroom. She is requesting a referral for liposuction.        Patient has been erroneously marked as diabetic. Based on the available clinical information, she does not have diabetes and should therefore be excluded from diabetic health maintenance and quality measures for the remainder of the reporting period.     Review of Systems   All other systems reviewed and are negative.       Objective       Vital Signs:   /94   Pulse 80   Temp 97.1 °F (36.2 °C)    "Resp 14   Ht 148.9 cm (58.62\")   Wt 80.3 kg (177 lb)   SpO2 98%   BMI 36.21 kg/m²     Physical Exam  Vitals and nursing note reviewed.   Constitutional:       General: She is not in acute distress.     Appearance: She is well-developed. She is obese. She is not ill-appearing.   HENT:      Head: Normocephalic and atraumatic.      Right Ear: Hearing, tympanic membrane, ear canal and external ear normal.      Left Ear: Hearing, tympanic membrane, ear canal and external ear normal.      Nose: Nose normal. No congestion or rhinorrhea.      Mouth/Throat:      Mouth: Mucous membranes are moist.      Pharynx: No oropharyngeal exudate or posterior oropharyngeal erythema.   Eyes:      General:         Right eye: No discharge.         Left eye: No discharge.      Conjunctiva/sclera: Conjunctivae normal.      Pupils: Pupils are equal, round, and reactive to light.   Neck:      Thyroid: No thyromegaly.   Cardiovascular:      Rate and Rhythm: Normal rate and regular rhythm.      Heart sounds: Normal heart sounds. No murmur heard.     No friction rub. No gallop.   Pulmonary:      Effort: Pulmonary effort is normal. No respiratory distress.      Breath sounds: Normal breath sounds. No wheezing or rales.   Abdominal:      General: Bowel sounds are normal. There is no distension.      Palpations: Abdomen is soft. There is no mass.      Tenderness: There is no abdominal tenderness. There is no guarding or rebound.   Musculoskeletal:      Right lower leg: No edema.      Left lower leg: No edema.   Lymphadenopathy:      Cervical: No cervical adenopathy.   Skin:     General: Skin is warm and dry.      Coloration: Skin is not jaundiced or pale.   Neurological:      General: No focal deficit present.      Mental Status: She is alert.   Psychiatric:         Mood and Affect: Mood normal.         Behavior: Behavior normal.          Result Review :                     Assessment and Plan    Diagnoses and all orders for this visit:    1. " Protracted URI (Primary)  -     azithromycin (Zithromax) 250 MG tablet; Take 2 tablets the first day, then 1 tablet daily for 4 days.  Dispense: 6 tablet; Refill: 0    2. Essential hypertension  -     CBC & Differential  -     Comprehensive Metabolic Panel  -     Lipid Panel With / Chol / HDL Ratio    3. Acquired hypothyroidism  -     TSH    4. Anxiety    5. Primary insomnia    6. RADHA on CPAP    7. Need for pneumococcal vaccination  -     Pneumococcal Conjugate Vaccine 20-Valent (PCV20)    8. Hyperglycemia  -     Comprehensive Metabolic Panel  -     Hemoglobin A1c      Upper respiratory infection:  - Prescribed Z-Justin.    Hypertension:  - Blood pressure has been elevated, but I think it is probably from the steroids.  - Continue to monitor blood pressure.  - If blood pressure is not coming down, she will let me know.    Hypothyroidism:  - Ordered TSH today.    Anxiety:  - Stable.  - Continue trazodone.    Sleep apnea:  - Continue CPAP.    Prediabetes:  - Ordered hemoglobin A1c.    Health maintenance:  - Ordered blood work.  - Pneumonia vaccine administered today.  - Follow up in 6 months.          DISCUSSION        Mike dated  was reviewed and appropriate.     Follow Up   Return in about 6 months (around 5/21/2024).    Patient was given instructions and counseling regarding her condition or for health maintenance advice. Please see specific information pulled into the AVS if appropriate.       Masoud Martin MD    Transcribed from ambient dictation for Masoud Martin MD by Felisha Smith.  11/21/23   10:15 EST    Patient or patient representative verbalized consent to the visit recording.  I have personally performed the services described in this document as transcribed by the above individual, and it is both accurate and complete.  Masoud Martin MD  11/21/2023  18:03 EST

## 2023-11-22 LAB
ALBUMIN SERPL-MCNC: 4 G/DL (ref 3.5–5.2)
ALBUMIN/GLOB SERPL: 1.9 G/DL
ALP SERPL-CCNC: 85 U/L (ref 39–117)
ALT SERPL-CCNC: 22 U/L (ref 1–33)
AST SERPL-CCNC: 23 U/L (ref 1–32)
BASOPHILS # BLD AUTO: 0.02 10*3/MM3 (ref 0–0.2)
BASOPHILS NFR BLD AUTO: 0.3 % (ref 0–1.5)
BILIRUB SERPL-MCNC: 0.6 MG/DL (ref 0–1.2)
BUN SERPL-MCNC: 16 MG/DL (ref 8–23)
BUN/CREAT SERPL: 23.5 (ref 7–25)
CALCIUM SERPL-MCNC: 9.4 MG/DL (ref 8.6–10.5)
CHLORIDE SERPL-SCNC: 103 MMOL/L (ref 98–107)
CHOLEST SERPL-MCNC: 210 MG/DL (ref 0–200)
CHOLEST/HDLC SERPL: 2.8 {RATIO}
CO2 SERPL-SCNC: 32.1 MMOL/L (ref 22–29)
CREAT SERPL-MCNC: 0.68 MG/DL (ref 0.57–1)
EGFRCR SERPLBLD CKD-EPI 2021: 96.2 ML/MIN/1.73
EOSINOPHIL # BLD AUTO: 0.18 10*3/MM3 (ref 0–0.4)
EOSINOPHIL NFR BLD AUTO: 2.3 % (ref 0.3–6.2)
ERYTHROCYTE [DISTWIDTH] IN BLOOD BY AUTOMATED COUNT: 13.8 % (ref 12.3–15.4)
GLOBULIN SER CALC-MCNC: 2.1 GM/DL
GLUCOSE SERPL-MCNC: 85 MG/DL (ref 65–99)
HBA1C MFR BLD: 5.6 % (ref 4.8–5.6)
HCT VFR BLD AUTO: 39.7 % (ref 34–46.6)
HDLC SERPL-MCNC: 75 MG/DL (ref 40–60)
HGB BLD-MCNC: 13.3 G/DL (ref 12–15.9)
IMM GRANULOCYTES # BLD AUTO: 0.02 10*3/MM3 (ref 0–0.05)
IMM GRANULOCYTES NFR BLD AUTO: 0.3 % (ref 0–0.5)
LDLC SERPL CALC-MCNC: 122 MG/DL (ref 0–100)
LYMPHOCYTES # BLD AUTO: 1.84 10*3/MM3 (ref 0.7–3.1)
LYMPHOCYTES NFR BLD AUTO: 23.1 % (ref 19.6–45.3)
MCH RBC QN AUTO: 30.7 PG (ref 26.6–33)
MCHC RBC AUTO-ENTMCNC: 33.5 G/DL (ref 31.5–35.7)
MCV RBC AUTO: 91.7 FL (ref 79–97)
MONOCYTES # BLD AUTO: 0.61 10*3/MM3 (ref 0.1–0.9)
MONOCYTES NFR BLD AUTO: 7.6 % (ref 5–12)
NEUTROPHILS # BLD AUTO: 5.31 10*3/MM3 (ref 1.7–7)
NEUTROPHILS NFR BLD AUTO: 66.4 % (ref 42.7–76)
NRBC BLD AUTO-RTO: 0 /100 WBC (ref 0–0.2)
PLATELET # BLD AUTO: 310 10*3/MM3 (ref 140–450)
POTASSIUM SERPL-SCNC: 4.5 MMOL/L (ref 3.5–5.2)
PROT SERPL-MCNC: 6.1 G/DL (ref 6–8.5)
RBC # BLD AUTO: 4.33 10*6/MM3 (ref 3.77–5.28)
SODIUM SERPL-SCNC: 143 MMOL/L (ref 136–145)
TRIGL SERPL-MCNC: 73 MG/DL (ref 0–150)
TSH SERPL DL<=0.005 MIU/L-ACNC: 0.64 UIU/ML (ref 0.27–4.2)
VLDLC SERPL CALC-MCNC: 13 MG/DL (ref 5–40)
WBC # BLD AUTO: 7.98 10*3/MM3 (ref 3.4–10.8)

## 2023-12-15 NOTE — PROGRESS NOTES
[facility]: Division of Pediatric Cardiology  Outpatient Evaluation     Summary    Reason For Visit: Murmur    Impression: The etiology of the murmur is: benign (flow murmur).    Plan: No further cardiac evaluation required at this time.      Cardiac Restrictions No cardiac restrictions. May participate in physical education and organized sports.    Endocarditis Prophylaxis: Not indicated    Respiratory Syncytial Virus Prophylaxis: No cardiac indications    Other Cardiac Clearance No further cardiac evaluation required prior to planned procedures. Cardiac anesthesia not recommended.     Primary Care Provider: LOTTIE Kyle    Martina Pham was seen at the request of LOTTIE Kyle for a chief complaint of a  heart murmur; a report with my findings is being sent via written or electronic means to the referring physician with my recommendations for treatment.    Accompanied by: Mother  : Not required  Language: English   Presentation   Chief Complaint: No chief complaint on file.    Presenting Concern: Martina is a 4 y.o. female with no significant past medical history who presents for an initial Pediatric Cardiology evaluation due to a murmur. Her murmur was first heard at a recent well child PCP visit (the first since age 6 months). This was the first time a murmur has been heard. Her mother reports the is active and will run and play but then occasionally throw up after running around. She has had no recent illnesses.     She has otherwise been in good health without additional concerns from her family or medical team. Specifically, there is no report of cyanosis, loss of consciousness, tachypnea with feeds or at rest, choking with feeds, or difficulty with weight gain.    Current Medications:  No long-term medications    Review of Systems: Please refer to separate questionnaire which was obtained and reviewed as a part of this visit.  Medical History  Spoke with pt and went over results/instructions. Verbalized understanding. "  Medical Conditions:  There is no problem list on file for this patient.    Past Surgeries:  No past surgical history on file.    Allergies:  Patient has no known allergies.    Family History:  There is no family history of congenital heart disease, arrhythmia or sudden cardiac death, cardiomyopathy, or familial dyslipidemia    Social History:   Lives with family, attends .      Physical Examination   BP 98/60 (BP Location: Right arm, Patient Position: Sitting, BP Cuff Size: Small child)   Pulse 119   Ht 0.917 m (3' 0.1\")   Wt 12.2 kg   BMI 14.51 kg/m²     General: Well-appearing and in no acute distress.  Head, Ears, Nose: Normocephalic, atraumatic. Normal facies.  Eyes: Sclera white. Pupils round and reactive.  Mouth, Neck: Mucous membranes moist. Grossly normal dentition. No jugular venous distension.  Chest: No chest wall deformities.  Heart: Normal S1 and S2.  Grade 2/6 vibratory systolic murmur at the left mid-sternal border with radiation: none. No diastolic murmur. No rubs, gallops, or clicks.   Pulses 2+ in upper and lower extremities bilaterally. No radio-femoral delay.  Lungs: Breathing comfortably without respiratory support. Good air entry bilaterally. No wheezes or crackles.  Abdomen: Soft, nontender, not distended. Normoactive bowel sounds. No hepatomegaly or splenomegaly. No hepatic bruit.  Extremities: No deformities. Capillary refill 2 seconds.   Neurologic / Psychiatric: Facial and extremity movement symmetric. No gross deficits. Appropriate behavior for age.    Results   Electrocardiogram (ECG):  An ECG was obtained today demonstrating:  Normal sinus rhythm at 106 beats per minute.  Regular axis for age.  Regular intervals for age.  msec, QTc 441 msec.  No ST segment or T wave abnormalities.    Assessment & Plan   Martina is a 4 y.o. female with no significant past medical history who presents due to a murmur. Today's evaluation demonstrated a flow murmur with an otherwise normal " examination and a normal ECG. As such, I believe her murmur to be benign. No further cardiac follow-up is required at this time.    Plan:  Testing requiring follow-up from today's visit: none  Cardiac medications: none  Diet recommendations: Regular  Follow-up: No routine Cardiology follow-up recommended at this time. Please return should any additional cardiac concerns arise.    This assessment and plan, in addition to the results of relevant testing were explained to Martina's Mother. All questions were answered, and understanding was demonstrated.     Quan Dsouza DO, FAAP  Pediatric Cardiology

## 2023-12-20 ENCOUNTER — TELEPHONE (OUTPATIENT)
Dept: FAMILY MEDICINE CLINIC | Facility: CLINIC | Age: 68
End: 2023-12-20
Payer: MEDICARE

## 2023-12-20 DIAGNOSIS — E03.9 ACQUIRED HYPOTHYROIDISM: ICD-10-CM

## 2023-12-20 NOTE — TELEPHONE ENCOUNTER
"  Caller: Sincere Ludimas CHIRINOS \"TK\"    Relationship: Self    Best call back number:      Requested Prescriptions:   Requested Prescriptions     Pending Prescriptions Disp Refills    levothyroxine (SYNTHROID, LEVOTHROID) 150 MCG tablet 90 tablet 0     Sig: Take 1 tablet by mouth Daily.        Pharmacy where request should be sent: [address] [phone] [facility] [phone]      Last office visit with prescribing clinician: 11/21/2023   Last telemedicine visit with prescribing clinician: Visit date not found   Next office visit with prescribing clinician: 5/21/2024     Additional details provided by patient:  PATIENT HAS BEEN WITHOUT FOR OVER A WEEK AND ALSO HAS TAKEN A LOWER DOSE     Does the patient have less than a 3 day supply:  [x] Yes  [] No        Hanny Frazier Rep   12/20/23 15:08 EST         "

## 2023-12-21 RX ORDER — LEVOTHYROXINE SODIUM 0.15 MG/1
150 TABLET ORAL DAILY
Qty: 90 TABLET | Refills: 0 | Status: SHIPPED | OUTPATIENT
Start: 2023-12-21

## 2023-12-28 DIAGNOSIS — I10 ESSENTIAL HYPERTENSION: ICD-10-CM

## 2023-12-28 RX ORDER — LOSARTAN POTASSIUM 100 MG/1
100 TABLET ORAL DAILY
Qty: 90 TABLET | Refills: 1 | Status: SHIPPED | OUTPATIENT
Start: 2023-12-28

## 2023-12-28 NOTE — TELEPHONE ENCOUNTER
"Caller: Connie Lu \"TK\"    Relationship: Self    Best call back number: [phone]    Requested Prescriptions:   Requested Prescriptions     Pending Prescriptions Disp Refills    losartan (COZAAR) 100 MG tablet 90 tablet 1     Sig: Take 1 tablet by mouth Daily.        Pharmacy where request should be sent: [address] [phone] [facility] [phone]      Last office visit with prescribing clinician: 11/21/2023   Last telemedicine visit with prescribing clinician: Visit date not found   Next office visit with prescribing clinician: 5/21/2024     Additional details provided by patient: PATIENT IS OUT OF THE MEDICATION     Does the patient have less than a 3 day supply:  [x] Yes  [] No    Would you like a call back once the refill request has been completed: [x] Yes [] No    If the office needs to give you a call back, can they leave a voicemail: [x] Yes [] No    Hanny Henry Rep   12/28/23 09:11 EST       "

## 2024-01-02 DIAGNOSIS — F41.9 ANXIETY: ICD-10-CM

## 2024-01-02 RX ORDER — VENLAFAXINE HYDROCHLORIDE 150 MG/1
150 CAPSULE, EXTENDED RELEASE ORAL DAILY
Qty: 90 CAPSULE | Refills: 0 | Status: SHIPPED | OUTPATIENT
Start: 2024-01-02

## 2024-01-30 ENCOUNTER — OFFICE VISIT (OUTPATIENT)
Dept: FAMILY MEDICINE CLINIC | Facility: CLINIC | Age: 69
End: 2024-01-30
Payer: MEDICARE

## 2024-01-30 VITALS
HEIGHT: 59 IN | DIASTOLIC BLOOD PRESSURE: 80 MMHG | WEIGHT: 177 LBS | HEART RATE: 67 BPM | TEMPERATURE: 98.7 F | OXYGEN SATURATION: 98 % | SYSTOLIC BLOOD PRESSURE: 130 MMHG | BODY MASS INDEX: 35.68 KG/M2 | RESPIRATION RATE: 16 BRPM

## 2024-01-30 DIAGNOSIS — G47.33 OBSTRUCTIVE SLEEP APNEA SYNDROME: Primary | ICD-10-CM

## 2024-01-30 RX ORDER — LAMOTRIGINE 150 MG/1
TABLET ORAL
COMMUNITY
End: 2024-01-30

## 2024-01-30 NOTE — PROGRESS NOTES
Date: 2024   Patient Name: Connie Lu  : 1955   MRN: [id number]     Chief Complaint:    Chief Complaint   Patient presents with    Sleep Study Assessment       History of Present Illness: Connie Lu is a 68 y.o. female who is here today to follow up for On her CPAP machine. Last home sleep study was completed on 2023. She is using her cpap nightly and doing well. She is having restful sleep, waking up rested, she will occasionally take naps but only on the weekends during her downtime. She does have some issues with mask but not eligable for new mask until 24. She is to follow up with  sleep medicine on 3/4/2024. No other acute concerns at this time.             Review of Systems:   Review of Systems   Respiratory:  Negative for cough.    Cardiovascular:  Negative for chest pain and palpitations.   Neurological:  Negative for headache.   Psychiatric/Behavioral:  Positive for sleep disturbance (wears cpap).        I have reviewed the patients family history, social history, past medical history, past surgical history and have updated it as appropriate.     Medications:     Current Outpatient Medications:     Cholecalciferol (VITAMIN D) 2000 units tablet, , Disp: , Rfl:     Cyanocobalamin (VITAMIN B-12) 500 MCG lozenge, , Disp: , Rfl:     levothyroxine (SYNTHROID, LEVOTHROID) 150 MCG tablet, Take 1 tablet by mouth Daily., Disp: 90 tablet, Rfl: 0    losartan (COZAAR) 100 MG tablet, Take 1 tablet by mouth Daily., Disp: 90 tablet, Rfl: 1    Multiple Vitamin (CALCIUM COMPLEX PO), Take  by mouth., Disp: , Rfl:     omeprazole (priLOSEC) 20 MG capsule, Take 1 capsule by mouth Daily., Disp: 90 capsule, Rfl: 1    traZODone (DESYREL) 100 MG tablet, Take 1-1.5 tablets by mouth Every Night. As needed for sleep, Disp: 135 tablet, Rfl: 1    venlafaxine XR (EFFEXOR-XR) 150 MG 24 hr capsule, Take 1 capsule by mouth Daily., Disp: 90 capsule, Rfl: 0    Allergies:   Allergies   Allergen  "Reactions    Cephalexin Hives and Itching     Tolerates PCN fine, tolerates other cephalosporins well.     Hydrocodone-Acetaminophen Other (See Comments)     Flushed face/redness    Sudafed [Pseudoephedrine Hcl] Itching     Scalp itching       PHQ-9 Total Score: 0     Physical Exam:  Vital Signs:   Vitals:    01/30/24 0810   BP: 130/80   Pulse: 67   Resp: 16   Temp: 98.7 °F (37.1 °C)   SpO2: 98%   Weight: 80.3 kg (177 lb)   Height: 148.9 cm (58.62\")     Body mass index is 36.21 kg/m².           Physical Exam  Vitals and nursing note reviewed.   Constitutional:       Appearance: Normal appearance.   HENT:      Head: Normocephalic and atraumatic.   Cardiovascular:      Rate and Rhythm: Normal rate and regular rhythm.   Pulmonary:      Effort: Pulmonary effort is normal.      Breath sounds: Normal breath sounds.   Skin:     General: Skin is warm.   Neurological:      General: No focal deficit present.      Mental Status: She is alert and oriented to person, place, and time.   Psychiatric:         Mood and Affect: Mood normal.           Assessment/Plan:   Diagnoses and all orders for this visit:    1. Obstructive sleep apnea syndrome (Primary)       Patient is to continue cpap.  Monitor for restless sleep  Keep appts with sleep medicine.  Wear cpap nightly and during naps.    Follow Up:   Return for Next scheduled follow up.      Galilea Fox. JAYANT   [facility]     "

## 2024-02-26 ENCOUNTER — OFFICE VISIT (OUTPATIENT)
Dept: FAMILY MEDICINE CLINIC | Facility: CLINIC | Age: 69
End: 2024-02-26
Payer: MEDICARE

## 2024-02-26 VITALS
RESPIRATION RATE: 18 BRPM | BODY MASS INDEX: 35.48 KG/M2 | SYSTOLIC BLOOD PRESSURE: 110 MMHG | WEIGHT: 176 LBS | HEART RATE: 76 BPM | TEMPERATURE: 98.1 F | HEIGHT: 59 IN | DIASTOLIC BLOOD PRESSURE: 68 MMHG

## 2024-02-26 DIAGNOSIS — Z01.818 PREOP EXAMINATION: Primary | ICD-10-CM

## 2024-02-26 DIAGNOSIS — M17.11 PRIMARY OSTEOARTHRITIS OF RIGHT KNEE: ICD-10-CM

## 2024-02-26 PROCEDURE — 99213 OFFICE O/P EST LOW 20 MIN: CPT | Performed by: FAMILY MEDICINE

## 2024-02-26 PROCEDURE — 1160F RVW MEDS BY RX/DR IN RCRD: CPT | Performed by: FAMILY MEDICINE

## 2024-02-26 PROCEDURE — 3074F SYST BP LT 130 MM HG: CPT | Performed by: FAMILY MEDICINE

## 2024-02-26 PROCEDURE — 93000 ELECTROCARDIOGRAM COMPLETE: CPT | Performed by: FAMILY MEDICINE

## 2024-02-26 PROCEDURE — 3078F DIAST BP <80 MM HG: CPT | Performed by: FAMILY MEDICINE

## 2024-02-26 PROCEDURE — 1159F MED LIST DOCD IN RCRD: CPT | Performed by: FAMILY MEDICINE

## 2024-02-26 NOTE — PROGRESS NOTES
Date: 2/26/2024  Provider seeing patient: Masoud Martin MD  Patient's PCP: Masoud Martin MD  Patient's Name: Connie Lu  .  Referring Surgeon: Dr Fontenot    Planned Procedure: TKR    Date of Operation: Pending    HPI/Chief Complaint:    Connie Lu is a 68 y.o. female who was referred for a pre-operative evaluation.       HPI     History of Present Illness  The patient is here for preoperative examination for right total knee replacement.    She is supposed to have a right knee replacement, but she does not have a date because they are waiting on her preop. She has lots of pain in her knee and it is bone on bone. Her left knee has been replaced and now it is time for the right knee replacement. Her shoulder is bothering her. She denies any chest pain, shortness of breath, or passing out spells. Her knee does swell and it is bigger than the other one. She is limping. She can not be on it for very long during the day and has to sit down and rest. She denies any issues with previous surgeries. She can go upstairs, but she can not come down. She is able to walk 3 to 4 blocks without getting short of breath.    She is having trouble with her bowels. She had a colonoscopy. She can eat what she wants, but she can be constipated and then she will go a few days later. Today, she has not had any problems, but this morning, she had diarrhea. She talked to Dr. Mustafa about this. She was put on Linzess several years ago, but she could not do it.   The patient denies smoking or alcohol intake.   She denies any family history of problems with surgeries.   She has an implant and sees a dentist regularly.       Preop Risk  None    Problem List   Patient Active Problem List   Diagnosis    Allergic rhinitis    Malignant neoplasm of breast    Carpal tunnel syndrome    Unspecified mood (affective) disorder    Hyperlipidemia    Hypertension    Hypothyroidism    Obstructive sleep apnea syndrome    Knee pain     Vitamin D deficiency    Hyperglycemia    Anxiety    Diverticulosis    Insomnia    Heart murmur    Lower back pain    Kidney function abnormal    Joint pain    Iron deficiency    GERD (gastroesophageal reflux disease)    Attention deficit disorder    Asthma    Prediabetes    Personal history of breast cancer    Obesity (BMI 30-39.9)        Current Meds  Current Outpatient Medications   Medication Sig Dispense Refill    Cholecalciferol (VITAMIN D) 2000 units tablet       Cyanocobalamin (VITAMIN B-12) 500 MCG lozenge       levothyroxine (SYNTHROID, LEVOTHROID) 150 MCG tablet Take 1 tablet by mouth Daily. 90 tablet 0    losartan (COZAAR) 100 MG tablet Take 1 tablet by mouth Daily. 90 tablet 1    Multiple Vitamin (CALCIUM COMPLEX PO) Take  by mouth.      omeprazole (priLOSEC) 20 MG capsule Take 1 capsule by mouth Daily. 90 capsule 1    traZODone (DESYREL) 100 MG tablet Take 1-1.5 tablets by mouth Every Night. As needed for sleep 135 tablet 1    venlafaxine XR (EFFEXOR-XR) 150 MG 24 hr capsule Take 1 capsule by mouth Daily. 90 capsule 0     No current facility-administered medications for this visit.       Allergies: is allergic to cephalexin, hydrocodone-acetaminophen, and sudafed [pseudoephedrine hcl].     PMH  Past Medical History:   Diagnosis Date    ADHD (attention deficit hyperactivity disorder) 30+ yrs ago    no action taken    Allergic annual    sinus infections    Anemia 8/01/2018    approximately    Anxiety     Arthritis 2017    Knee replaced Dr Fontenot    Asthma     worse with heat, has inhaler for rare need    Attention deficit disorder     Boil     states it was not MRSA, once, many years ago    Breast cancer 2010    estrogen driven. S/p left mastectomy and right reduction. No requirements for radiation or chemo.  5 yrs tamoxifen    Carpal tunnel syndrome     Chronic cholecystitis with calculus     US stones Has epi pain, nausea    Constipation     on linzess    Depression     Diverticulosis     noted on  "colonoscopy    Gallstone     Heart murmur     Has seen cardiologist 2017, had preop workup for left TKR    Hiatal hernia with gastroesophageal reflux disease and esophagitis     controlled with esomeprazole.  EGD [facility] 6/18 HH, gr II esophagitis, superf antral ulcerations, 35 cm zline, path anturm neg h. pyl, stool ag 5/18 neg h. pyl.  path DE reflux    History of blood transfusion     as an infant, unknown season.     Hypertension     Hypothyroidism     Insomnia     Iron deficiency     on iron supplements    Joint pain     effexor helps. No NSAIDS, no injections.     Kidney function abnormal     \"little low\" on previous labs, advised increased hydration    Lower back pain     Obesity 1978    RADHA (obstructive sleep apnea)     Non CPAP compliant    Polyp of sigmoid colon     Prediabetes     Hb A1C 6.10 5/18    Visual impairment 50 years    wear contacts/glasses         Past Surgical History   has a past surgical history that includes mastectomy with immediate reconstruction (Left, 2010); Retinal Detachment Repair (Left, 07/2017); Replacement total knee (Left, 08/2017); Breast surgery (Right, 2010); Colonoscopy (2016); Tonsillectomy and adenoidectomy (1960, 1962); Esophagogastroduodenoscopy; Cosmetic surgery; Skin biopsy; Gastric Sleeve (N/A, 11/21/2018); Cholecystectomy (N/A, 11/21/2018); Hiatal hernia repair (N/A, 11/21/2018); Esophagogastroduodenoscopy (N/A, 11/21/2018); Adenoidectomy (1960 or 1962); Bariatric Surgery (11/2018); Eye surgery (2017); Fracture surgery (1965); Joint replacement (2017); Lymph node biopsy (2010); Cataract extraction; and Shoulder surgery (Right, 02/04/2021).    Cardiac History: none  Anesthesia Complications: none    SH:   reports that she has never smoked. She has never used smokeless tobacco. She reports that she does not drink alcohol and does not use drugs.    FH:  family history includes Arthritis in her mother; Cancer in her brother and father; Diabetes in her father; Hearing loss in " "her father, mother, and paternal grandmother; Heart attack in her father; Heart disease in her father, maternal grandmother, paternal grandfather, and paternal grandmother; Hypertension in her maternal grandfather, maternal grandmother, mother, paternal grandfather, and paternal grandmother; Kidney disease in her maternal grandmother; Kidney failure in her maternal grandmother; Leukemia in her father; Obesity in her mother and paternal grandmother; Skin cancer in her brother and father; Sleep apnea in her father; Stomach cancer in her maternal grandfather; Thyroid disease in her mother.    Review Of Systems:    Review of Systems   Constitutional: Negative.    HENT: Negative.     Respiratory: Negative.     Cardiovascular: Negative.    Gastrointestinal:  Positive for constipation and diarrhea.   Musculoskeletal: Negative.    Neurological: Negative.    Psychiatric/Behavioral: Negative.     All other systems reviewed and are negative.        Objective      Vitals:    02/26/24 1207   BP: 110/68   Pulse: 76   Resp: 18   Temp: 98.1 °F (36.7 °C)   Weight: 79.8 kg (176 lb)   Height: 148.9 cm (58.62\")        Physical Exam  Vitals and nursing note reviewed.   Constitutional:       General: She is not in acute distress.     Appearance: She is well-developed. She is not ill-appearing.   HENT:      Head: Normocephalic and atraumatic.      Right Ear: Hearing, tympanic membrane, ear canal and external ear normal.      Left Ear: Hearing, tympanic membrane, ear canal and external ear normal.      Nose: Nose normal. No congestion or rhinorrhea.      Mouth/Throat:      Mouth: Mucous membranes are moist.      Pharynx: No oropharyngeal exudate or posterior oropharyngeal erythema.   Eyes:      General:         Right eye: No discharge.         Left eye: No discharge.      Conjunctiva/sclera: Conjunctivae normal.      Pupils: Pupils are equal, round, and reactive to light.   Neck:      Thyroid: No thyromegaly.   Cardiovascular:      Rate " and Rhythm: Normal rate and regular rhythm.      Heart sounds: Normal heart sounds. No murmur heard.     No friction rub. No gallop.   Pulmonary:      Effort: Pulmonary effort is normal. No respiratory distress.      Breath sounds: Normal breath sounds. No wheezing or rales.   Abdominal:      General: Bowel sounds are normal. There is no distension.      Palpations: Abdomen is soft. There is no mass.      Tenderness: There is no abdominal tenderness. There is no guarding or rebound.   Musculoskeletal:      Right lower leg: No edema.      Left lower leg: No edema.   Lymphadenopathy:      Cervical: No cervical adenopathy.   Skin:     General: Skin is warm and dry.      Coloration: Skin is not jaundiced or pale.   Neurological:      General: No focal deficit present.      Mental Status: She is alert.   Psychiatric:         Mood and Affect: Mood normal.         Behavior: Behavior normal.         EKG:       ECG 12 Lead    Date/Time: 2/26/2024 12:25 PM  Performed by: Masoud Martin MD    Authorized by: Masoud Martin MD  Comparison: compared with previous ECG from 10/24/2018  Similar to previous ECG  Rhythm: sinus rhythm  Rate: normal  BPM: 73  Conduction: conduction normal  QRS axis: normal  Other findings: non-specific ST-T wave changes    Clinical impression: non-specific ECG  Comments: No ischemic changes                  Assessment     Problems Addressed this Visit    None  Visit Diagnoses       Preop examination    -  Primary    Primary osteoarthritis of right knee              Diagnoses         Codes Comments    Preop examination    -  Primary ICD-10-CM: Z01.818  ICD-9-CM: V72.84     Primary osteoarthritis of right knee     ICD-10-CM: M17.11  ICD-9-CM: 715.16              No orders of the defined types were placed in this encounter.    Assessment & Plan  1. Preoperative examination for right total knee replacement.  At this point, she is cleared for surgery. EKG shows no ischemic changes and no change from EKG of  2018. She had blood work done in 11/2023, which was normal including CBC and CMP. Blood work was not done today since she may need labs prior to surgery if surgery date is scheduled greater than 30 days from today. She expressed understanding. I will fax labs to Dr. Fontenot and clearance for them to go ahead and schedule her surgery.    2. Intermittent diarrhea and constipation.  In addition, she also reported some episodes of intermittent diarrhea and constipation. She has seen Dr. Msutafa. It sounds like it is probably irritable bowel with intermittent constipation and diarrhea. I explained the difficulty in treating this, so she will continue to monitor.           Connie Lu is at low risk for a radha-operative cardiac event for the planned procedure.  Determination on clearance for this procedure once labs reviewed.      Masoud Martin MD    Patient consented using Ambien listening technology for recording and dictation of this office visit note.

## 2024-03-04 ENCOUNTER — OFFICE VISIT (OUTPATIENT)
Dept: SLEEP MEDICINE | Facility: CLINIC | Age: 69
End: 2024-03-04
Payer: MEDICARE

## 2024-03-04 VITALS
DIASTOLIC BLOOD PRESSURE: 74 MMHG | HEIGHT: 58 IN | HEART RATE: 70 BPM | SYSTOLIC BLOOD PRESSURE: 118 MMHG | TEMPERATURE: 98 F | WEIGHT: 176 LBS | OXYGEN SATURATION: 98 % | BODY MASS INDEX: 36.94 KG/M2

## 2024-03-04 DIAGNOSIS — G47.33 OSA (OBSTRUCTIVE SLEEP APNEA): Primary | ICD-10-CM

## 2024-03-04 PROCEDURE — 99213 OFFICE O/P EST LOW 20 MIN: CPT | Performed by: NURSE PRACTITIONER

## 2024-03-04 PROCEDURE — 3074F SYST BP LT 130 MM HG: CPT | Performed by: NURSE PRACTITIONER

## 2024-03-04 PROCEDURE — 3078F DIAST BP <80 MM HG: CPT | Performed by: NURSE PRACTITIONER

## 2024-03-04 NOTE — PROGRESS NOTES
Chief Complaint:   Chief Complaint   Patient presents with    Apnea       HPI:    Connie Lu is a 68 y.o. female here for follow-up of sleep apnea.  Patient was last seen 7/18/2023.  Patient states she is doing very well with her Sarika 2 machine.  Patient is sleeping 7 and half hours nightly and feels rested upon awakening.  Patient goes to sleep quickly and does not get up during the night.  Patient has an Jayess score of 11/24.  Patient does well with fullface mask in standard tubing.  Patient has no concerns or complaints and will continue therapy.        Current medications are:   Current Outpatient Medications:     Cholecalciferol (VITAMIN D) 2000 units tablet, , Disp: , Rfl:     Cyanocobalamin (VITAMIN B-12) 500 MCG lozenge, , Disp: , Rfl:     levothyroxine (SYNTHROID, LEVOTHROID) 150 MCG tablet, Take 1 tablet by mouth Daily., Disp: 90 tablet, Rfl: 0    losartan (COZAAR) 100 MG tablet, Take 1 tablet by mouth Daily., Disp: 90 tablet, Rfl: 1    Multiple Vitamin (CALCIUM COMPLEX PO), Take  by mouth., Disp: , Rfl:     omeprazole (priLOSEC) 20 MG capsule, Take 1 capsule by mouth Daily., Disp: 90 capsule, Rfl: 1    traZODone (DESYREL) 100 MG tablet, Take 1-1.5 tablets by mouth Every Night. As needed for sleep, Disp: 135 tablet, Rfl: 1    venlafaxine XR (EFFEXOR-XR) 150 MG 24 hr capsule, Take 1 capsule by mouth Daily., Disp: 90 capsule, Rfl: 0.      The patient's relevant past medical, surgical, family and social history were reviewed and updated in Epic as appropriate.       Review of Systems   HENT:  Positive for congestion.    Respiratory:  Positive for apnea.    Gastrointestinal:         Heartburn   Psychiatric/Behavioral:  Positive for sleep disturbance. The patient is nervous/anxious.    All other systems reviewed and are negative.        Objective:    Physical Exam  Constitutional:       Appearance: Normal appearance.   HENT:      Head: Normocephalic and atraumatic.      Mouth/Throat:      Comments:  Mallampati 2 anatomy  Cardiovascular:      Rate and Rhythm: Normal rate and regular rhythm.   Pulmonary:      Effort: Pulmonary effort is normal.      Breath sounds: Normal breath sounds.   Skin:     General: Skin is warm and dry.   Neurological:      Mental Status: She is alert and oriented to person, place, and time.   Psychiatric:         Mood and Affect: Mood normal.         Behavior: Behavior normal.         Thought Content: Thought content normal.         Judgment: Judgment normal.         CPAP Report    24/30 days of use  Greater than 4-hour use 73%  AHI of 2.9  95th percentile pressure 11.4  Settings 11-18  The patient continues to use and benefit from CPAP therapy.    ASSESSMENT/PLAN    Diagnoses and all orders for this visit:    1. RADHA (obstructive sleep apnea) (Primary)  -     PAP Therapy        Counseled patient regarding multimodal approach with healthy nutrition, healthy sleep, regular physical activity, social activities, counseling, and medications. Encouraged to practice lateral sleep position. Avoid alcohol and sedatives close to bedtime.  Refill supplies x 1 year.  Return to clinic 1 year or sooner as symptoms warrant.      Signed by  JAYANT Currie    March 5, 2024      CC: Masoud Martin MD         No ref. provider found

## 2024-03-28 ENCOUNTER — HOSPITAL ENCOUNTER (EMERGENCY)
Age: 69
Discharge: HOME | End: 2024-03-28
Payer: MEDICARE

## 2024-03-28 ENCOUNTER — TELEPHONE (OUTPATIENT)
Dept: FAMILY MEDICINE CLINIC | Facility: CLINIC | Age: 69
End: 2024-03-28
Payer: MEDICARE

## 2024-03-28 VITALS — BODY MASS INDEX: 35.5 KG/M2

## 2024-03-28 VITALS
SYSTOLIC BLOOD PRESSURE: 167 MMHG | RESPIRATION RATE: 20 BRPM | HEART RATE: 87 BPM | TEMPERATURE: 98.24 F | OXYGEN SATURATION: 96 % | DIASTOLIC BLOOD PRESSURE: 86 MMHG

## 2024-03-28 VITALS
OXYGEN SATURATION: 96 % | HEART RATE: 87 BPM | SYSTOLIC BLOOD PRESSURE: 167 MMHG | TEMPERATURE: 98.3 F | DIASTOLIC BLOOD PRESSURE: 86 MMHG | RESPIRATION RATE: 20 BRPM

## 2024-03-28 DIAGNOSIS — E03.9 ACQUIRED HYPOTHYROIDISM: ICD-10-CM

## 2024-03-28 DIAGNOSIS — K21.9: ICD-10-CM

## 2024-03-28 DIAGNOSIS — H66.93: Primary | ICD-10-CM

## 2024-03-28 DIAGNOSIS — I10: ICD-10-CM

## 2024-03-28 DIAGNOSIS — E03.9: ICD-10-CM

## 2024-03-28 DIAGNOSIS — F41.9 ANXIETY: ICD-10-CM

## 2024-03-28 DIAGNOSIS — J20.9: ICD-10-CM

## 2024-03-28 PROCEDURE — G0463 HOSPITAL OUTPT CLINIC VISIT: HCPCS

## 2024-03-28 PROCEDURE — 99212 OFFICE O/P EST SF 10 MIN: CPT

## 2024-03-28 PROCEDURE — 99214 OFFICE O/P EST MOD 30 MIN: CPT

## 2024-03-28 RX ORDER — VENLAFAXINE HYDROCHLORIDE 150 MG/1
150 CAPSULE, EXTENDED RELEASE ORAL DAILY
Qty: 90 CAPSULE | Refills: 1 | Status: SHIPPED | OUTPATIENT
Start: 2024-03-28

## 2024-03-28 RX ORDER — LEVOTHYROXINE SODIUM 0.15 MG/1
150 TABLET ORAL DAILY
Qty: 90 TABLET | Refills: 1 | Status: SHIPPED | OUTPATIENT
Start: 2024-03-28

## 2024-03-28 NOTE — EXP.UTC
Discharge Plan
Disposition
Patient Disposition: Home, Self-Care

Condition: Good

Prescriptions
Prescriptions:
New
  azithromycin [Zithromax] 250 mg tablet 
   250 mg PO UD DOSE PK Qty: 6 0RF
   Rx Instructions:
   Take two (2) tablets today, then one (1) tablet days #2 thru #5
  benzonatate 100 mg capsule 
   100 mg PO TIDP PRN (Reason: Cough) Qty: 30 0RF
  methylprednisolone 4 mg Tablets,Dose Pack 
   4 mg PO AS DIRECTED 6 Days Qty: 21 0RF
   Rx Instructions:
   Take 1 pack as directed for 6 days
  guaifenesin [Mucinex] 600 mg tablet extended release 12hr 
   600 - 1,200 mg PO BIDP PRN (Reason: Congestion) Qty: 30 0RF

No Action
  trazodone 100 mg tablet 
   100 mg PO DAILY 
  losartan 100 mg tablet 
   100 mg PO DAILY 
  omeprazole 20 mg capsule,delayed release(DR/EC) 
   20 mg PO DAILY 
  venlafaxine 25 mg tablet 
   25 mg PO DAILY 
  levothyroxine 25 mcg capsule 
   25 mcg PO DAILY 

Referrals
Follow up/Referrals:
Buck Dominguez MD [Primary Care Provider] - See instructions

Activity Restrictions/Add. Instructions
Additional Instructions/Restrictions:
Drink plenty of fluids.

Take tylenol or ibuprofen for pain or fever.

Take the medications as directed.

Follow up with your regular doctor.

***GO TO THE ER FOR ANY WORSENING SYMPTOMS***

Clinical Impressions
Clinical Impression:
 Otitis media, Bronchitis


Instructions
Patient Instructions:  Middle Ear Infection, DI for Acute Bronchitis

Discharge
ED Provider: Betito Ruth


[facility] HPI
General
Stated complaint: cough, ear pain
Time Seen by Provider: 03/28/24 16:13
Related Data
Home Medications

 Medication  Instructions  Recorded  Confirmed
levothyroxine 25 mcg capsule 25 mcg PO DAILY thyroid 07/07/21 03/28/24
losartan 100 mg tablet 100 mg PO DAILY bp 07/07/21 03/28/24
omeprazole 20 mg capsule,delayed 20 mg PO DAILY acid reflux 07/07/21 03/28/24
release   
trazodone 100 mg tablet 100 mg PO DAILY sleeping 07/07/21 03/28/24
venlafaxine 25 mg tablet 25 mg PO DAILY Anxiety 07/07/21 03/28/24

Previous Rx's

 Medication  Instructions  Recorded
azithromycin 250 mg tablet 250 mg PO UD DOSE PK #6 tabs 03/28/24
(Zithromax)  
benzonatate 100 mg capsule 100 mg PO TIDP PRN Cough #30 caps 03/28/24
guaifenesin 600 mg tablet, 600 - 1,200 mg (1 - 2 x 600 mg) PO 03/28/24
extended release 12 hr (Mucinex) BIDP PRN Congestion #30 tabs 
methylprednisolone 4 mg tablets in 4 mg PO AS DIRECTED 6 days #21 tabs 03/28/24
a dose pack  


Allergies

Allergy/AdvReac Type Severity Reaction Status Date / Time
cephalexin [From Keflex] Allergy  Hives Verified 10/19/21 14:39



[facility]
Disclaimer: 
The information contained in this section may have been updated after the patient was seen, as this information can be updated by other users.

Medical History (Updated 03/28/24 @ 17:01 by KING Jack)

Thyroid disease
Depression
Anxiety
History of gastroesophageal reflux (GERD)
Breast cancer
Asthma
Hypertension


Surgical History (Updated 03/28/24 @ 16:25 by Florecita Vuong RN)

History of tonsillectomy
History of appendectomy


Social History
Smoking Status:  Never smoker 
second hand exposure:  No 
alcohol intake:  never 
substance use type:  denies use 
current occupational status:  retired 
Travel in the last 8 weeks:  None 
housing:  house 
current occupational exposures/hazards:  No 
caffeine:  Yes 



ROS Obtained: Yes All systems reviewed & no additional complaints except as documented
Constitutional
Constitutional: Denies chills, Reports fever(s) and Reports poor appetite
Eyes
Eyes: Denies eye discharge
ENT
Ears, Nose, Mouth, and Throat: Denies ear discharge, Reports otalgia, Denies hearing loss, Denies sinus pain and Reports sore throat
Cardiovascular
Cardiovascular: Denies chest pain and Denies dyspnea
Respiratory
Respiratory: Denies chest congestion, Reports cough and Denies dyspnea
Gastrointestinal
Gastrointestingal: Denies abdominal pain, diarrhea, nausea or vomiting
Musculoskeletal
Musculoskeletal: Denies arthralgias
Integumentary/Breasts
Skin/Breast: Denies rash

Physical Exam
General
General appearance: alert and in no apparent distress
Head
Head exam: atraumatic, normocephalic and normal inspection
Eye
Eye exam: Present normal appearance; Absent PERRL or EOMI
ENT
ENT exam: Present mucous membranes moist and normal external ear exam
Expanded ENT Exam
TM/Canal exam: Bilateral TM: erythema, bulging and effusion
Nose exam: Absent sinus tenderness
Nasal speculum exam: Bilateral: normal
Mouth exam: Present normal external inspection and other; Absent drooling
Teeth exam: Present normal inspection
Throat exam: Present tonsillar erythema and tonsillomegaly
Neck
Neck exam: Present normal inspection, full ROM and trachea midline; Absent tenderness, meningismus or lymphadenopathy
Chest
Chest inspection: Present normal inspection and symmetric chest wall rise; Absent tenderness
Respiratory
Respiratory exam: Present normal lung sounds bilaterally; Absent respiratory distress, wheezes or stridor
Cardiovascular
Cardiovascular exam: Present regular rate, normal rhythm and normal heart sounds; Absent tachycardia or irregular rhythm
Abdominal Exam
Abdominal exam: Present soft and normal bowel sounds; Absent distention, tenderness, guarding, rebound or rigidity
Extremities Exam
Extremities exam: Present normal inspection and normal capillary refill; Absent tenderness, joint swelling or calf tenderness
Back Exam
Back exam: Present normal inspection and full ROM; Absent tenderness, CVA tenderness (R) or CVA tenderness (L)
Neurological Exam
Neurological exam: Present alert, oriented X3, CN II-XII intact, normal gait and reflexes normal; Absent motor sensory deficit
Psychiatric
Psychiatric exam: Present normal affect and normal mood
Skin
Skin exam: Present warm, dry, intact and normal color
Lymphatic
Lymphatic Findings: no adenopathy

Medical Decision Making
Medical Records
Medical records reviewed: No I reviewed the patient's medical records.
Juna Inquiry
Pt receiving controlled substance: No
Lab Data
Lab results reviewed: Yes I reviewed the patient's lab results.

## 2024-03-28 NOTE — TELEPHONE ENCOUNTER
"Caller: Sincere Ludimas CHIRINOS \"TK\"    Relationship: Self    Best call back number: [phone]     Requested Prescriptions:   -VENLAFAXINE XR (EFFEXOR-XR) 150MG    -LEVOTHYROXINE (SYNTHROID, LEVOTHROID) 150MCG     Pharmacy where request should be sent: [address] [phone] [facility] [phone]      Last office visit with prescribing clinician: 2/26/2024   Last telemedicine visit with prescribing clinician: Visit date not found   Next office visit with prescribing clinician: 5/21/2024     Additional details provided by patient: PATIENT HAS ABOUT 2 DAYS REMAINING.    Does the patient have less than a 3 day supply:  [x] Yes  [] No    Would you like a call back once the refill request has been completed: [] Yes [x] No    If the office needs to give you a call back, can they leave a voicemail: [] Yes [x] No    Hanny Cuevas Rep   03/28/24 13:30 EDT   "

## 2024-03-28 NOTE — ED_ITS
Discharge Plan    
Disposition    
Patient Disposition: Home, Self-Care    
    
Condition: Good    
    
Prescriptions    
Prescriptions:    
New    
  azithromycin [Zithromax] 250 mg tablet     
   250 mg PO UD DOSE PK Qty: 6 0RF    
   Rx Instructions:    
   Take two (2) tablets today, then one (1) tablet days #2 thru #5    
  benzonatate 100 mg capsule     
   100 mg PO TIDP PRN (Reason: Cough) Qty: 30 0RF    
  methylprednisolone 4 mg Tablets,Dose Pack     
   4 mg PO AS DIRECTED 6 Days Qty: 21 0RF    
   Rx Instructions:    
   Take 1 pack as directed for 6 days    
  guaifenesin [Mucinex] 600 mg tablet extended release 12hr     
   600 - 1,200 mg PO BIDP PRN (Reason: Congestion) Qty: 30 0RF    
    
No Action    
  trazodone 100 mg tablet     
   100 mg PO DAILY     
  losartan 100 mg tablet     
   100 mg PO DAILY     
  omeprazole 20 mg capsule,delayed release(DR/EC)     
   20 mg PO DAILY     
  venlafaxine 25 mg tablet     
   25 mg PO DAILY     
  levothyroxine 25 mcg capsule     
   25 mcg PO DAILY     
    
Referrals    
Follow up/Referrals:    
Buck Dominguez MD [Primary Care Provider] - See instructions    
    
Activity Restrictions/Add. Instructions    
Additional Instructions/Restrictions:    
Drink plenty of fluids.    
    
Take tylenol or ibuprofen for pain or fever.    
    
Take the medications as directed.    
    
Follow up with your regular doctor.    
    
***GO TO THE ER FOR ANY WORSENING SYMPTOMS***    
    
Clinical Impressions    
Clinical Impression:    
 Otitis media, Bronchitis    
    
    
Instructions    
Patient Instructions:  Middle Ear Infection, DI for Acute Bronchitis    
    
Discharge    
ED Provider: Betito Ruth    
    
    
[facility] HPI    
General    
Stated complaint: cough, ear pain    
Time Seen by Provider: 03/28/24 16:13    
Related Data    
                                Home Medications    
    
    
    
 Medication  Instructions  Recorded  Confirmed    
     
levothyroxine 25 mcg capsule 25 mcg PO DAILY thyroid 07/07/21 03/28/24    
     
losartan 100 mg tablet 100 mg PO DAILY bp 07/07/21 03/28/24    
     
omeprazole 20 mg capsule,delayed 20 mg PO DAILY acid reflux 07/07/21 03/28/24    
    
release       
     
trazodone 100 mg tablet 100 mg PO DAILY sleeping 07/07/21 03/28/24    
     
venlafaxine 25 mg tablet 25 mg PO DAILY Anxiety 07/07/21 03/28/24    
    
    
                                  Previous Rx's    
    
    
    
 Medication  Instructions  Recorded    
     
azithromycin 250 mg tablet 250 mg PO UD DOSE PK #6 tabs 03/28/24    
    
(Zithromax)      
     
benzonatate 100 mg capsule 100 mg PO TIDP PRN Cough #30 caps 03/28/24    
     
guaifenesin 600 mg tablet, 600 - 1,200 mg (1 - 2 x 600 mg) PO 03/28/24    
    
extended release 12 hr (Mucinex) BIDP PRN Congestion #30 tabs     
     
methylprednisolone 4 mg tablets in 4 mg PO AS DIRECTED 6 days #21 tabs 03/28/24    
    
a dose pack      
    
    
    
                                    Allergies    
    
    
    
Allergy/AdvReac Type Severity Reaction Status Date / Time    
     
cephalexin [From Keflex] Allergy  Hives Verified 10/19/21 14:39    
    
    
    
    
[facility]    
Disclaimer:     
The information contained in this section may have been updated after the   
patient was seen, as this information can be updated by other users.    
    
Medical History (Updated 03/28/24 @ 17:01 by KING Jack)    
    
Thyroid disease    
Depression    
Anxiety    
History of gastroesophageal reflux (GERD)    
Breast cancer    
Asthma    
Hypertension    
    
    
Surgical History (Updated 03/28/24 @ 16:25 by Florecita Vuong RN)    
    
History of tonsillectomy    
History of appendectomy    
    
    
Social History    
Smoking Status:  Never smoker     
second hand exposure:  No     
alcohol intake:  never     
substance use type:  denies use     
current occupational status:  retired     
Travel in the last 8 weeks:  None     
housing:  house     
current occupational exposures/hazards:  No     
caffeine:  Yes     
    
    
    
ROS Obtained: Yes All systems reviewed & no additional complaints except as   
documented    
Constitutional    
Constitutional: Denies chills, Reports fever(s) and Reports poor appetite    
Eyes    
Eyes: Denies eye discharge    
ENT    
Ears, Nose, Mouth, and Throat: Denies ear discharge, Reports otalgia, Denies   
hearing loss, Denies sinus pain and Reports sore throat    
Cardiovascular    
Cardiovascular: Denies chest pain and Denies dyspnea    
Respiratory    
Respiratory: Denies chest congestion, Reports cough and Denies dyspnea    
Gastrointestinal    
Gastrointestingal: Denies abdominal pain, diarrhea, nausea or vomiting    
Musculoskeletal    
Musculoskeletal: Denies arthralgias    
Integumentary/Breasts    
Skin/Breast: Denies rash    
    
Physical Exam    
General    
General appearance: alert and in no apparent distress    
Head    
Head exam: atraumatic, normocephalic and normal inspection    
Eye    
Eye exam: Present normal appearance; Absent PERRL or EOMI    
ENT    
ENT exam: Present mucous membranes moist and normal external ear exam    
Expanded ENT Exam    
TM/Canal exam: Bilateral TM: erythema, bulging and effusion    
Nose exam: Absent sinus tenderness    
Nasal speculum exam: Bilateral: normal    
Mouth exam: Present normal external inspection and other; Absent drooling    
Teeth exam: Present normal inspection    
Throat exam: Present tonsillar erythema and tonsillomegaly    
Neck    
Neck exam: Present normal inspection, full ROM and trachea midline; Absent   
tenderness, meningismus or lymphadenopathy    
Chest    
Chest inspection: Present normal inspection and symmetric chest wall rise;   
Absent tenderness    
Respiratory    
Respiratory exam: Present normal lung sounds bilaterally; Absent respiratory   
distress, wheezes or stridor    
Cardiovascular    
Cardiovascular exam: Present regular rate, normal rhythm and normal heart   
sounds; Absent tachycardia or irregular rhythm    
Abdominal Exam    
Abdominal exam: Present soft and normal bowel sounds; Absent distention,   
tenderness, guarding, rebound or rigidity    
Extremities Exam    
Extremities exam: Present normal inspection and normal capillary refill; Absent   
tenderness, joint swelling or calf tenderness    
Back Exam    
Back exam: Present normal inspection and full ROM; Absent tenderness, CVA   
tenderness (R) or CVA tenderness (L)    
Neurological Exam    
Neurological exam: Present alert, oriented X3, CN II-XII intact, normal gait and  
reflexes normal; Absent motor sensory deficit    
Psychiatric    
Psychiatric exam: Present normal affect and normal mood    
Skin    
Skin exam: Present warm, dry, intact and normal color    
Lymphatic    
Lymphatic Findings: no adenopathy    
    
Medical Decision Making    
Medical Records    
Medical records reviewed: No I reviewed the patient's medical records.    
Juan Inquiry    
Pt receiving controlled substance: No    
Lab Data    
Lab results reviewed: Yes I reviewed the patient's lab results.

## 2024-05-30 RX ORDER — OMEPRAZOLE 20 MG/1
20 CAPSULE, DELAYED RELEASE ORAL DAILY
Qty: 90 CAPSULE | Refills: 0 | Status: SHIPPED | OUTPATIENT
Start: 2024-05-30

## 2024-07-01 ENCOUNTER — TELEPHONE (OUTPATIENT)
Dept: FAMILY MEDICINE CLINIC | Facility: CLINIC | Age: 69
End: 2024-07-01
Payer: MEDICARE

## 2024-07-01 DIAGNOSIS — I10 ESSENTIAL HYPERTENSION: ICD-10-CM

## 2024-07-01 RX ORDER — ONDANSETRON 4 MG/1
4 TABLET, ORALLY DISINTEGRATING ORAL EVERY 8 HOURS PRN
Qty: 15 TABLET | Refills: 2 | Status: SHIPPED | OUTPATIENT
Start: 2024-07-01

## 2024-07-01 RX ORDER — LOSARTAN POTASSIUM 100 MG/1
100 TABLET ORAL DAILY
Qty: 90 TABLET | Refills: 1 | Status: SHIPPED | OUTPATIENT
Start: 2024-07-01

## 2024-07-01 NOTE — TELEPHONE ENCOUNTER
Please call, requested meds sent to pharmacy.     I sent in the blood pressure medicine and Zofran for nausea.  Follow-up on July 3 as scheduled.

## 2024-07-01 NOTE — TELEPHONE ENCOUNTER
"    Caller: Connie Lu \"TK\"    Relationship: Self    Best call back number: [phone]    Requested Prescriptions:   Requested Prescriptions     Pending Prescriptions Disp Refills    losartan (COZAAR) 100 MG tablet [Pharmacy Med Name: LOSARTAN POT 100MG] 90 tablet 0     Sig: TAKE 1 TABLET BY MOUTH DAILY.    ALSO NEEDS REFILL OF ANTI NAUSEA MEDICATION/ SHE DOESN'T KNOW THE NAME OF THE MEDICATION    Pharmacy where request should be sent: [place] [phone] [place] [phone]     Last office visit with prescribing clinician: 2/26/2024   Last telemedicine visit with prescribing clinician: Visit date not found   Next office visit with prescribing clinician: 7/23/2024     Additional details provided by patient:     Does the patient have less than a 3 day supply:  [x] Yes  [] No    Would you like a call back once the refill request has been completed: [x] Yes [] No    If the office needs to give you a call back, can they leave a voicemail: [x] Yes [] No    Hanny Ojeda Rep   07/01/24 11:40 EDT           "

## 2024-07-05 ENCOUNTER — EXTERNAL PBMM DATA (OUTPATIENT)
Dept: PHARMACY | Facility: OTHER | Age: 69
End: 2024-07-05
Payer: MEDICARE

## 2024-07-16 DIAGNOSIS — F51.01 PRIMARY INSOMNIA: ICD-10-CM

## 2024-07-16 RX ORDER — TRAZODONE HYDROCHLORIDE 100 MG/1
100-150 TABLET ORAL NIGHTLY
Qty: 135 TABLET | Refills: 0 | Status: SHIPPED | OUTPATIENT
Start: 2024-07-16 | End: 2024-07-17

## 2024-07-17 ENCOUNTER — OFFICE VISIT (OUTPATIENT)
Dept: BEHAVIORAL HEALTH | Facility: CLINIC | Age: 69
End: 2024-07-17
Payer: MEDICARE

## 2024-07-17 VITALS — HEIGHT: 58 IN | WEIGHT: 175 LBS | BODY MASS INDEX: 36.73 KG/M2

## 2024-07-17 DIAGNOSIS — F41.9 ANXIETY: Primary | ICD-10-CM

## 2024-07-17 DIAGNOSIS — G47.09 OTHER INSOMNIA: ICD-10-CM

## 2024-07-17 RX ORDER — ZOLPIDEM TARTRATE 6.25 MG/1
6.25 TABLET, FILM COATED, EXTENDED RELEASE ORAL NIGHTLY PRN
Qty: 30 TABLET | Refills: 0 | Status: SHIPPED | OUTPATIENT
Start: 2024-07-17 | End: 2024-07-18 | Stop reason: SDUPTHER

## 2024-07-17 NOTE — PROGRESS NOTES
"     Office  Follow Up Visit      Patient Name: Connie Lu  : 1955   MRN: [id number]     Referring Provider: Masoud Martin MD    Chief Complaint: Medication follow-up    ICD-10-CM ICD-9-CM   1. Anxiety  F41.9 300.00   2. Other insomnia  G47.09 780.52        History of Present Illness:   Connie Lu is a 69 y.o. female who is here today for follow up related previous diagnosis of anxiety and insomnia.  Patient's current medication regimen includes venlafaxine  mg and trazodone 150 mg nightly.  Patient had not been seen by psych APRN since 2023.  Patient reports that she had several appointments scheduled but missed them due to scheduling conflicts or forgetting.  Patient states \"I was afraid you would not see me again so I decided not to come back.\"      Patient reports that she has experienced some significant loss including her mother passing in 2022, breaking up with her significant other earlier in the year, and her brother who was diagnosed with renal cancer in 2023 passed in 2024.  Patient affirms current and continuous grieving.      Patient reports that she is in a committed partnership at this time and is hopeful it would lead to marriage.  She reports that she has struggled communicating the need to be  and states \"I do not want to feel like I am nagging him.\"  Patient reports that overall anxiety is well managed but feels her biggest concern is her insomnia.  Patient reports that she has been having to do higher and higher doses of trazodone and finds to be less effective.    Patient was diagnosed with obstructive sleep apnea in  and currently is using a CPAP.  However, patient reports that she struggles to wear it most nights and has not been very consistent with its use.  She reports fatigue, difficulty with sleep onset, and difficulties with sustained sleep.    Subjective      Review of Systems:   Review of Systems " "  Constitutional: Negative.    Respiratory: Negative.     Cardiovascular: Negative.    Gastrointestinal: Negative.    Genitourinary: Negative.    Musculoskeletal: Negative.    Neurological: Negative.    Psychiatric/Behavioral: Negative.         Patient History:   The following portions of the patient's history were reviewed and updated as appropriate: allergies, current medications, past family history, past medical history, past social history, past surgical history and problem list.     Social:  Patient had a recent right knee replacement.  Brother passed April 2024 related to renal cancer.    Medications:     Current Outpatient Medications:     Cholecalciferol (VITAMIN D) 2000 units tablet, , Disp: , Rfl:     Cyanocobalamin (VITAMIN B-12) 500 MCG lozenge, , Disp: , Rfl:     levothyroxine (SYNTHROID, LEVOTHROID) 150 MCG tablet, TAKE ONE TABLET BY MOUTH ONCE DAILY, Disp: 90 tablet, Rfl: 1    losartan (COZAAR) 100 MG tablet, TAKE 1 TABLET BY MOUTH DAILY., Disp: 90 tablet, Rfl: 1    Multiple Vitamin (CALCIUM COMPLEX PO), Take  by mouth., Disp: , Rfl:     omeprazole (priLOSEC) 20 MG capsule, TAKE ONE CAPSULE BY MOUTH ONCE DAILY, Disp: 90 capsule, Rfl: 0    ondansetron ODT (ZOFRAN-ODT) 4 MG disintegrating tablet, Take 1 tablet by mouth Every 8 (Eight) Hours As Needed for Nausea or Vomiting., Disp: 15 tablet, Rfl: 2    venlafaxine XR (EFFEXOR-XR) 150 MG 24 hr capsule, TAKE ONE CAPSULE BY MOUTH ONCE DAILY, Disp: 90 capsule, Rfl: 1    zolpidem CR (Ambien CR) 6.25 MG CR tablet, Take 1 tablet by mouth At Night As Needed for Sleep., Disp: 30 tablet, Rfl: 0    Objective     Physical Exam:  Vital Signs:   Vitals:    07/17/24 1032   BP: Comment: cuff error   Weight: 79.4 kg (175 lb)   Height: 147.3 cm (58\")     Body mass index is 36.58 kg/m².     Mental Status Exam:   Hygiene:   good  Cooperation:  Cooperative  Eye Contact:  Good  Psychomotor Behavior:  Appropriate  Affect:  Appropriate  Mood: sad, anxious, and " fluctates  Speech:  Pressured  Thought Process:  Circum  Thought Content:  Mood congruent  Suicidal:  None  Homicidal:  None  Hallucinations:  None  Delusion:  None  Memory:  Intact  Orientation:  Grossly intact  Reliability:  good  Insight:  Fair  Judgement:  Fair  Impulse Control:  Fair  Physical/Medical Issues:  Yes several medical comorbidities, see chart      Assessment / Plan      Visit Diagnosis/Orders Placed This Visit:  Diagnoses and all orders for this visit:    1. Anxiety (Primary)    2. Other insomnia  -     zolpidem CR (Ambien CR) 6.25 MG CR tablet; Take 1 tablet by mouth At Night As Needed for Sleep.  Dispense: 30 tablet; Refill: 0         Differential:   Grief    Plan:   Continue venlafaxine  mg daily.  Continue trazodone.  Initiate zolpidem CR 6.25 mg nightly as needed.  Discussed grief process.  Encouraged open and appropriate communication with significant other.  Encouraged patient to specify needs.    Therapy plan: Continually work through grief process as well as having open and effective communication with significant other.  Learning to specified needs and not be afraid to speak needs.  Spiritual needs discussed.    Continue supportive psychotherapy efforts and medications as indicated. Treatment and medication options discussed during today's visit. Patient ackowledged and verbally consented to continue with current treatment plan and was educated on the importance of compliance with treatment and follow-up appointments. Patient seems reasonably able to adhere to treatment plan.      Medication Considerations:  Discussed medication options and treatment plan of prescribed medication(s) as well as the risks, benefits, and potential side effects. Patient is agreeable to call the office with any worsening of symptoms or onset of side effects. Patient is agreeable to call 911 or go to the nearest ER should he/she begin having SI/HI.    Quality Measures:   Never smoker    I advised Connie  TARAN Lu of the risks of tobacco use.     Follow Up:   Return in about 3 months (around 10/17/2024) for Recheck.      JAYANT Berger, PMHNP-BC

## 2024-07-18 ENCOUNTER — TELEPHONE (OUTPATIENT)
Dept: BEHAVIORAL HEALTH | Facility: CLINIC | Age: 69
End: 2024-07-18
Payer: MEDICARE

## 2024-07-18 DIAGNOSIS — G47.09 OTHER INSOMNIA: ICD-10-CM

## 2024-07-18 RX ORDER — ZOLPIDEM TARTRATE 6.25 MG/1
6.25 TABLET, FILM COATED, EXTENDED RELEASE ORAL NIGHTLY PRN
Qty: 30 TABLET | Refills: 0 | Status: SHIPPED | OUTPATIENT
Start: 2024-07-18

## 2024-07-18 RX ORDER — ZOLPIDEM TARTRATE 6.25 MG/1
6.25 TABLET, FILM COATED, EXTENDED RELEASE ORAL NIGHTLY PRN
Qty: 30 TABLET | Refills: 0 | OUTPATIENT
Start: 2024-07-18

## 2024-07-18 NOTE — TELEPHONE ENCOUNTER
Pt called because her pharmacy does not carry Ambien in that dosage. She is requesting it be raised to 10mg or script sent to [facility] pharmacy in hopes that dose is available there. Please advise

## 2024-07-22 ENCOUNTER — TELEPHONE (OUTPATIENT)
Dept: BEHAVIORAL HEALTH | Facility: CLINIC | Age: 69
End: 2024-07-22
Payer: MEDICARE

## 2024-07-22 DIAGNOSIS — G47.09 OTHER INSOMNIA: ICD-10-CM

## 2024-07-22 NOTE — TELEPHONE ENCOUNTER
Pt calls reporting that she has taken her Ambien for 3 nights and it does not seem to be having any effect, she reports insomnia all night on 7/21. Please advise

## 2024-07-23 ENCOUNTER — OFFICE VISIT (OUTPATIENT)
Dept: FAMILY MEDICINE CLINIC | Facility: CLINIC | Age: 69
End: 2024-07-23
Payer: MEDICARE

## 2024-07-23 VITALS
WEIGHT: 176 LBS | DIASTOLIC BLOOD PRESSURE: 84 MMHG | SYSTOLIC BLOOD PRESSURE: 122 MMHG | HEIGHT: 58 IN | HEART RATE: 78 BPM | BODY MASS INDEX: 36.94 KG/M2 | RESPIRATION RATE: 18 BRPM | TEMPERATURE: 97.5 F

## 2024-07-23 DIAGNOSIS — T14.8XXA BITES: ICD-10-CM

## 2024-07-23 DIAGNOSIS — R26.89 BALANCE PROBLEM: Primary | ICD-10-CM

## 2024-07-23 DIAGNOSIS — R73.9 HYPERGLYCEMIA: ICD-10-CM

## 2024-07-23 DIAGNOSIS — E03.9 ACQUIRED HYPOTHYROIDISM: ICD-10-CM

## 2024-07-23 DIAGNOSIS — E61.1 LOW IRON: ICD-10-CM

## 2024-07-23 DIAGNOSIS — I10 ESSENTIAL HYPERTENSION: ICD-10-CM

## 2024-07-23 DIAGNOSIS — R53.83 OTHER FATIGUE: ICD-10-CM

## 2024-07-23 DIAGNOSIS — F41.9 ANXIETY: ICD-10-CM

## 2024-07-23 PROCEDURE — 3074F SYST BP LT 130 MM HG: CPT | Performed by: FAMILY MEDICINE

## 2024-07-23 PROCEDURE — 1160F RVW MEDS BY RX/DR IN RCRD: CPT | Performed by: FAMILY MEDICINE

## 2024-07-23 PROCEDURE — 1159F MED LIST DOCD IN RCRD: CPT | Performed by: FAMILY MEDICINE

## 2024-07-23 PROCEDURE — 99214 OFFICE O/P EST MOD 30 MIN: CPT | Performed by: FAMILY MEDICINE

## 2024-07-23 PROCEDURE — 3079F DIAST BP 80-89 MM HG: CPT | Performed by: FAMILY MEDICINE

## 2024-07-23 PROCEDURE — 1126F AMNT PAIN NOTED NONE PRSNT: CPT | Performed by: FAMILY MEDICINE

## 2024-07-23 RX ORDER — ZOLPIDEM TARTRATE 12.5 MG/1
12.5 TABLET, FILM COATED, EXTENDED RELEASE ORAL NIGHTLY PRN
Qty: 10 TABLET | Refills: 0 | Status: SHIPPED | OUTPATIENT
Start: 2024-07-23

## 2024-07-23 NOTE — PROGRESS NOTES
Chief Complaint  Anxiety (Follow up /), Fatigue (Iron? ), and look at spots     Subjective          Connie Lu presents to [facility]    History of Present Illness  The patient is a 69-year-old female who is here for follow-up.    The patient reports experiencing fatigue for the past 3 days. She was previously prescribed trazodone 150 mg by , but discontinued its use due to dissatisfaction. Consequently,  prescribed a low dose of Ambien, which proved ineffective, prompting an increase in the dosage to 12.5 mg.    The patient underwent a right knee arthroplasty, which is healing well. She is currently undergoing physical therapy for her knee at [facility].    The patient's mood remains stable. She is currently on Effexor for her anxiety and depression.    The patient reports experiencing balance issues for the past 6 months. She denies experiencing dizziness, lightheadedness, chest pain, or difficulty breathing. She occasionally experiences leg swelling.    The patient continues to take losartan for her blood pressure.    The patient is currently on thyroid medication.    The patient reports occasional chest pain, which resolves spontaneously.    The patient reports insect bites on her neck, left leg, and upper part of her legs. She denies any bites on her hands. She has pets at home, but she has not seen any bites on her dog.    Supplemental Information  She has restless legs syndrome. She takes 2 ibuprofen every night before she goes to bed, which helps.   Her brother was diagnosed with stage IV kidney cancer.        OTHER NOTES:        Review of Systems   Constitutional:  Positive for fatigue.   Respiratory: Negative.     Cardiovascular: Negative.    Psychiatric/Behavioral:  Positive for sleep disturbance.    All other systems reviewed and are negative.       Objective       Vital Signs:   /84   Pulse 78   Temp 97.5 °F (36.4 °C)   Resp 18   Ht  "147.3 cm (58\")   Wt 79.8 kg (176 lb)   BMI 36.78 kg/m²     Physical Exam  Vitals and nursing note reviewed.   Constitutional:       Appearance: She is well-developed.   HENT:      Head: Normocephalic and atraumatic.      Right Ear: Hearing and external ear normal.      Left Ear: Hearing and external ear normal.   Eyes:      Conjunctiva/sclera: Conjunctivae normal.      Pupils: Pupils are equal, round, and reactive to light.   Cardiovascular:      Rate and Rhythm: Normal rate and regular rhythm.      Heart sounds: Normal heart sounds. No murmur heard.     No friction rub.   Pulmonary:      Effort: Pulmonary effort is normal. No respiratory distress.      Breath sounds: Normal breath sounds. No wheezing or rales.   Musculoskeletal:      Right lower leg: No edema.      Left lower leg: No edema.   Skin:     General: Skin is warm.   Neurological:      General: No focal deficit present.      Mental Status: She is alert and oriented to person, place, and time.      Cranial Nerves: No cranial nerve deficit.      Sensory: No sensory deficit.      Motor: No weakness.      Coordination: Coordination normal.      Gait: Gait normal.      Deep Tendon Reflexes: Reflexes normal.   Psychiatric:         Behavior: Behavior normal.            Physical Exam  Neurologic examination appears normal.    Result Review :            Other Results    Results               Assessment and Plan    Diagnoses and all orders for this visit:    1. Balance problem (Primary)  -     Ambulatory Referral to Physical Therapy    2. Essential hypertension  -     CBC & Differential  -     Lipid Panel With / Chol / HDL Ratio  -     Comprehensive Metabolic Panel    3. Acquired hypothyroidism  -     TSH    4. Other fatigue  -     CBC & Differential  -     TSH  -     Vitamin B12  -     Iron and TIBC  -     Ferritin    5. Hyperglycemia  -     Hemoglobin A1c    6. Bites    7. Anxiety    8. Low iron  -     Iron and TIBC  -     Ferritin     "           DISCUSSION    Assessment & Plan  1. Balance problem.  A physical therapy evaluation and treatment is recommended.    2. Hypertension.  The patient's blood pressure is well-managed. The current medication regimen will be maintained. A Complete Blood Count (CBC) and CMP will be conducted.    3. Hypothyroidism.  TSH levels will be checked.    4. Fatigue.  CBC, CMP, TSH, B12, and iron panel will be checked. The patient has a history of low iron.    5. Hyperglycemia.  A recheck of A1c levels will be conducted.    6. Insect bites.  The patient's insect bites appear to be insect bites, and she is concerned about potential flea bites. It is recommended that she have her pet checked at the vet to ascertain if they have fleas. If so, appropriate treatment will be necessary.    7. Anxiety.  The patient's anxiety is stable on venlafaxine. Continuation of follow-up with behavioral health is recommended.    Follow-up  Follow-up will be scheduled once the lab results are reviewed and determined.        Follow Up   Return in about 6 months (around 1/23/2025) for follow up depends on review of labs and testing.    Patient was given instructions and counseling regarding her condition or for health maintenance advice. Please see specific information pulled into the AVS if appropriate.       Masoud Martin MD    Patient or patient representative verbalized consent for the use of Ambient Listening during the visit with  Masoud Martin MD for chart documentation. 7/24/2024  10:36 EDT

## 2024-07-24 LAB
ALBUMIN SERPL-MCNC: 4.2 G/DL (ref 3.9–4.9)
ALP SERPL-CCNC: 126 IU/L (ref 44–121)
ALT SERPL-CCNC: 15 IU/L (ref 0–32)
AST SERPL-CCNC: 19 IU/L (ref 0–40)
BASOPHILS # BLD AUTO: 0 X10E3/UL (ref 0–0.2)
BASOPHILS NFR BLD AUTO: 0 %
BILIRUB SERPL-MCNC: <0.2 MG/DL (ref 0–1.2)
BUN SERPL-MCNC: 22 MG/DL (ref 8–27)
BUN/CREAT SERPL: 30 (ref 12–28)
CALCIUM SERPL-MCNC: 9.3 MG/DL (ref 8.7–10.3)
CHLORIDE SERPL-SCNC: 100 MMOL/L (ref 96–106)
CHOLEST SERPL-MCNC: 204 MG/DL (ref 100–199)
CHOLEST/HDLC SERPL: 2.9 RATIO (ref 0–4.4)
CO2 SERPL-SCNC: 27 MMOL/L (ref 20–29)
CREAT SERPL-MCNC: 0.74 MG/DL (ref 0.57–1)
EGFRCR SERPLBLD CKD-EPI 2021: 89 ML/MIN/1.73
EOSINOPHIL # BLD AUTO: 0.2 X10E3/UL (ref 0–0.4)
EOSINOPHIL NFR BLD AUTO: 3 %
ERYTHROCYTE [DISTWIDTH] IN BLOOD BY AUTOMATED COUNT: 13.6 % (ref 11.7–15.4)
FERRITIN SERPL-MCNC: 35 NG/ML (ref 15–150)
GLOBULIN SER CALC-MCNC: 2.4 G/DL (ref 1.5–4.5)
GLUCOSE SERPL-MCNC: 89 MG/DL (ref 70–99)
HBA1C MFR BLD: 5.7 % (ref 4.8–5.6)
HCT VFR BLD AUTO: 41.9 % (ref 34–46.6)
HDLC SERPL-MCNC: 71 MG/DL
HGB BLD-MCNC: 13.5 G/DL (ref 11.1–15.9)
IMM GRANULOCYTES # BLD AUTO: 0 X10E3/UL (ref 0–0.1)
IMM GRANULOCYTES NFR BLD AUTO: 0 %
IRON SATN MFR SERPL: 8 % (ref 15–55)
IRON SERPL-MCNC: 29 UG/DL (ref 27–139)
LDLC SERPL CALC-MCNC: 116 MG/DL (ref 0–99)
LYMPHOCYTES # BLD AUTO: 2.4 X10E3/UL (ref 0.7–3.1)
LYMPHOCYTES NFR BLD AUTO: 29 %
MCH RBC QN AUTO: 29.2 PG (ref 26.6–33)
MCHC RBC AUTO-ENTMCNC: 32.2 G/DL (ref 31.5–35.7)
MCV RBC AUTO: 91 FL (ref 79–97)
MONOCYTES # BLD AUTO: 0.8 X10E3/UL (ref 0.1–0.9)
MONOCYTES NFR BLD AUTO: 9 %
NEUTROPHILS # BLD AUTO: 4.9 X10E3/UL (ref 1.4–7)
NEUTROPHILS NFR BLD AUTO: 59 %
PLATELET # BLD AUTO: 353 X10E3/UL (ref 150–450)
POTASSIUM SERPL-SCNC: 5.5 MMOL/L (ref 3.5–5.2)
PROT SERPL-MCNC: 6.6 G/DL (ref 6–8.5)
RBC # BLD AUTO: 4.63 X10E6/UL (ref 3.77–5.28)
SODIUM SERPL-SCNC: 140 MMOL/L (ref 134–144)
TIBC SERPL-MCNC: 345 UG/DL (ref 250–450)
TRIGL SERPL-MCNC: 97 MG/DL (ref 0–149)
TSH SERPL DL<=0.005 MIU/L-ACNC: 0.9 UIU/ML (ref 0.45–4.5)
UIBC SERPL-MCNC: 316 UG/DL (ref 118–369)
VIT B12 SERPL-MCNC: >2000 PG/ML (ref 232–1245)
VLDLC SERPL CALC-MCNC: 17 MG/DL (ref 5–40)
WBC # BLD AUTO: 8.4 X10E3/UL (ref 3.4–10.8)

## 2024-08-13 DIAGNOSIS — G47.09 OTHER INSOMNIA: ICD-10-CM

## 2024-08-13 RX ORDER — ZOLPIDEM TARTRATE 12.5 MG/1
12.5 TABLET, FILM COATED, EXTENDED RELEASE ORAL NIGHTLY PRN
Qty: 30 TABLET | Refills: 0 | Status: SHIPPED | OUTPATIENT
Start: 2024-08-13

## 2024-08-13 NOTE — TELEPHONE ENCOUNTER
Pt called reporting that when she takes Ambien 12.5 with Zquil she is able to sleep. When she takes two of the smaller dose Ambien she also can sleep and does not need Zquil. Requesting refill of dosage you think best

## 2024-08-20 RX ORDER — OMEPRAZOLE 20 MG/1
20 CAPSULE, DELAYED RELEASE ORAL DAILY
Qty: 90 CAPSULE | Refills: 0 | Status: SHIPPED | OUTPATIENT
Start: 2024-08-20

## 2024-09-16 DIAGNOSIS — G47.09 OTHER INSOMNIA: ICD-10-CM

## 2024-09-17 RX ORDER — ZOLPIDEM TARTRATE 12.5 MG/1
12.5 TABLET, FILM COATED, EXTENDED RELEASE ORAL NIGHTLY PRN
Qty: 30 TABLET | Refills: 0 | Status: SHIPPED | OUTPATIENT
Start: 2024-09-17

## 2024-10-06 DIAGNOSIS — E03.9 ACQUIRED HYPOTHYROIDISM: ICD-10-CM

## 2024-10-08 RX ORDER — LEVOTHYROXINE SODIUM 150 UG/1
150 TABLET ORAL DAILY
Qty: 90 TABLET | Refills: 1 | Status: SHIPPED | OUTPATIENT
Start: 2024-10-08

## 2024-10-09 DIAGNOSIS — F41.9 ANXIETY: ICD-10-CM

## 2024-10-09 RX ORDER — VENLAFAXINE HYDROCHLORIDE 150 MG/1
150 CAPSULE, EXTENDED RELEASE ORAL DAILY
Qty: 90 CAPSULE | Refills: 1 | Status: SHIPPED | OUTPATIENT
Start: 2024-10-09

## 2024-10-09 NOTE — TELEPHONE ENCOUNTER
"    Caller: Connie Lu \"TK\"    Relationship: Self    Best call back number: [phone]     Requested Prescriptions:   Requested Prescriptions     Pending Prescriptions Disp Refills    venlafaxine XR (EFFEXOR-XR) 150 MG 24 hr capsule 90 capsule 1     Sig: Take 1 capsule by mouth Daily.        Pharmacy where request should be sent: [address] [phone] [facility] [phone]      Last office visit with prescribing clinician: 7/23/2024   Last telemedicine visit with prescribing clinician: Visit date not found   Next office visit with prescribing clinician: 1/23/2025     Additional details provided by patient: IS OUT AND NEEDS ASAP.    Does the patient have less than a 3 day supply:  [x] Yes  [] No    Would you like a call back once the refill request has been completed: [] Yes [x] No    If the office needs to give you a call back, can they leave a voicemail: [] Yes [x] No    Hanny Hook Rep   10/09/24 11:48 EDT             "

## 2024-10-18 DIAGNOSIS — G47.09 OTHER INSOMNIA: ICD-10-CM

## 2024-10-19 RX ORDER — ZOLPIDEM TARTRATE 12.5 MG/1
12.5 TABLET, FILM COATED, EXTENDED RELEASE ORAL NIGHTLY PRN
Qty: 30 TABLET | Refills: 0 | Status: SHIPPED | OUTPATIENT
Start: 2024-10-19

## 2024-11-21 DIAGNOSIS — G47.09 OTHER INSOMNIA: ICD-10-CM

## 2024-11-21 RX ORDER — ZOLPIDEM TARTRATE 12.5 MG/1
12.5 TABLET, FILM COATED, EXTENDED RELEASE ORAL NIGHTLY PRN
Qty: 30 TABLET | Refills: 0 | Status: SHIPPED | OUTPATIENT
Start: 2024-11-21

## 2024-12-02 NOTE — TELEPHONE ENCOUNTER
"    Caller: Connie Lu TARAN \"TK\"    Relationship: Self    Best call back number: [phone]     Requested Prescriptions:   Requested Prescriptions     Pending Prescriptions Disp Refills    omeprazole (priLOSEC) 20 MG capsule 90 capsule 0     Sig: Take 1 capsule by mouth Daily.        Pharmacy where request should be sent: [address] [phone] [facility] [phone]      Last office visit with prescribing clinician: 7/23/2024   Last telemedicine visit with prescribing clinician: Visit date not found   Next office visit with prescribing clinician: 1/23/2025     Additional details provided by patient:     Does the patient have less than a 3 day supply:  [x] Yes  [] No    Would you like a call back once the refill request has been completed: [] Yes [x] No    If the office needs to give you a call back, can they leave a voicemail: [] Yes [x] No    Hanny Leone Rep   12/02/24 15:58 EST           "

## 2024-12-17 DIAGNOSIS — F41.9 ANXIETY: ICD-10-CM

## 2024-12-17 NOTE — TELEPHONE ENCOUNTER
Caller: [facility] - [place], [place] - [address] - [phone] [facility] [phone] FX    Relationship: Pharmacy    Best call back number: [phone]     Requested Prescriptions:   Requested Prescriptions     Pending Prescriptions Disp Refills    venlafaxine XR (EFFEXOR-XR) 150 MG 24 hr capsule 90 capsule 1     Sig: Take 1 capsule by mouth Daily.        Pharmacy where request should be sent: [facility] - [place], [place] - [address] - [phone] [facility] [phone] FX     Last office visit with prescribing clinician: 7/23/2024   Last telemedicine visit with prescribing clinician: Visit date not found   Next office visit with prescribing clinician: 1/23/2025     Additional details provided by patient: PHARMACY HAS CALLED REQUESTING A NEW PRESCRIPTION ON ABOVE MEDICATION. THIS WILL BE FIRST TIME USING THIS PHARMACY.     Does the patient have less than a 3 day supply:  [x] Yes  [] No    Would you like a call back once the refill request has been completed: [] Yes [x] No    If the office needs to give you a call back, can they leave a voicemail: [] Yes [x] No    Chris Munoz   12/17/24 16:21 EST

## 2024-12-18 RX ORDER — VENLAFAXINE HYDROCHLORIDE 150 MG/1
150 CAPSULE, EXTENDED RELEASE ORAL DAILY
Qty: 90 CAPSULE | Refills: 1 | OUTPATIENT
Start: 2024-12-18

## 2024-12-18 NOTE — TELEPHONE ENCOUNTER
Called patient. She will be using this pharmacy come the beginning of the year but not yet.   She will request refills when needed.

## 2024-12-20 DIAGNOSIS — G47.09 OTHER INSOMNIA: ICD-10-CM

## 2024-12-20 RX ORDER — ZOLPIDEM TARTRATE 12.5 MG/1
12.5 TABLET, FILM COATED, EXTENDED RELEASE ORAL NIGHTLY PRN
Qty: 30 TABLET | Refills: 0 | Status: SHIPPED | OUTPATIENT
Start: 2024-12-20

## 2024-12-30 DIAGNOSIS — I10 ESSENTIAL HYPERTENSION: ICD-10-CM

## 2024-12-30 RX ORDER — LOSARTAN POTASSIUM 100 MG/1
100 TABLET ORAL DAILY
Qty: 90 TABLET | Refills: 1 | Status: SHIPPED | OUTPATIENT
Start: 2024-12-30

## 2024-12-30 RX ORDER — ONDANSETRON 4 MG/1
4 TABLET, ORALLY DISINTEGRATING ORAL EVERY 8 HOURS PRN
Qty: 15 TABLET | Refills: 2 | Status: SHIPPED | OUTPATIENT
Start: 2024-12-30

## 2024-12-30 NOTE — TELEPHONE ENCOUNTER
"Caller: Connie Lu \"TK\"    Relationship: Self    Best call back number: [phone]     Requested Prescriptions:   Requested Prescriptions     Pending Prescriptions Disp Refills    ondansetron ODT (ZOFRAN-ODT) 4 MG disintegrating tablet 15 tablet 2     Sig: Take 1 tablet by mouth Every 8 (Eight) Hours As Needed for Nausea or Vomiting.    losartan (COZAAR) 100 MG tablet 90 tablet 1     Sig: Take 1 tablet by mouth Daily.        Pharmacy where request should be sent: [place] [phone] [facility] [phone]      Last office visit with prescribing clinician: 7/23/2024   Last telemedicine visit with prescribing clinician: Visit date not found   Next office visit with prescribing clinician: 1/23/2025     Additional details provided by patient: PATIENT IS OUT.     Does the patient have less than a 3 day supply:  [x] Yes  [] No    Would you like a call back once the refill request has been completed: [] Yes [x] No    If the office needs to give you a call back, can they leave a voicemail: [] Yes [x] No    Cadance Dunaway, RegSched Rep   12/30/24 16:27 EST       "

## 2025-01-10 DIAGNOSIS — E03.9 ACQUIRED HYPOTHYROIDISM: ICD-10-CM

## 2025-01-10 NOTE — TELEPHONE ENCOUNTER
"    Caller: Connie Lu TARAN \"TK\"    Relationship: Self    Best call back number: [phone]     Requested Prescriptions:   Requested Prescriptions     Pending Prescriptions Disp Refills    levothyroxine (SYNTHROID, LEVOTHROID) 150 MCG tablet 90 tablet 1     Sig: Take 1 tablet by mouth Daily.        Pharmacy where request should be sent: [address] [phone] [facility] [phone]      Last office visit with prescribing clinician: 7/23/2024   Last telemedicine visit with prescribing clinician: Visit date not found   Next office visit with prescribing clinician: 1/23/2025     Additional details provided by patient:     Does the patient have less than a 3 day supply:  [x] Yes  [] No    Would you like a call back once the refill request has been completed: [] Yes [x] No    If the office needs to give you a call back, can they leave a voicemail: [] Yes [x] No    Hanny Leone Rep   01/10/25 16:15 EST           "

## 2025-01-13 DIAGNOSIS — F41.9 ANXIETY: ICD-10-CM

## 2025-01-13 DIAGNOSIS — E03.9 ACQUIRED HYPOTHYROIDISM: ICD-10-CM

## 2025-01-13 RX ORDER — LEVOTHYROXINE SODIUM 150 UG/1
150 TABLET ORAL DAILY
Qty: 90 TABLET | Refills: 0 | Status: SHIPPED | OUTPATIENT
Start: 2025-01-13

## 2025-01-13 NOTE — TELEPHONE ENCOUNTER
"Caller: Connie Lu \"TK\"    Relationship: Self    Best call back number: [phone]     Requested Prescriptions:   Requested Prescriptions     Pending Prescriptions Disp Refills    venlafaxine XR (EFFEXOR-XR) 150 MG 24 hr capsule 90 capsule 1     Sig: Take 1 capsule by mouth Daily.        Pharmacy where request should be sent: [address] [phone] [facility] [phone]      Last office visit with prescribing clinician: 7/23/2024   Last telemedicine visit with prescribing clinician: Visit date not found   Next office visit with prescribing clinician: 1/23/2025     Additional details provided by patient: PATIENT IS OUT.     Does the patient have less than a 3 day supply:  [x] Yes  [] No    Would you like a call back once the refill request has been completed: [] Yes [x] No    If the office needs to give you a call back, can they leave a voicemail: [] Yes [x] No    Cadance Dunaway, RegSched Rep   01/13/25 14:40 EST      "

## 2025-01-14 RX ORDER — VENLAFAXINE HYDROCHLORIDE 150 MG/1
150 CAPSULE, EXTENDED RELEASE ORAL DAILY
Qty: 90 CAPSULE | Refills: 1 | Status: SHIPPED | OUTPATIENT
Start: 2025-01-14

## (undated) DEVICE — SUT SILK 0 SH 30IN K834H

## (undated) DEVICE — ENDOPOUCH RETRIEVER SPECIMEN RETRIEVAL BAGS: Brand: ENDOPOUCH RETRIEVER

## (undated) DEVICE — CONTN GRAD MEAS TRIANG 32OZ BLK

## (undated) DEVICE — SYS CLS PORTSITE CT CLOSESURE 5AND10/12

## (undated) DEVICE — ANTIBACTERIAL VIOLET BRAIDED (POLYGLACTIN 910), SYNTHETIC ABSORBABLE SUTURE: Brand: COATED VICRYL

## (undated) DEVICE — MARYLAND JAW LAPAROSCOPIC SEALER/DIVIDER COATED: Brand: LIGASURE

## (undated) DEVICE — SHT AIR TRANSFR COMFRT GLIDE LT LAT 40X80IN

## (undated) DEVICE — SKIN AFFIX SURG ADHESIVE 72/CS 0.55ML: Brand: MEDLINE

## (undated) DEVICE — SUT MONOCRYL PLS ANTIB UND 3/0  PS1 27IN

## (undated) DEVICE — FLTR PLUMEPORT LAP W/CONN STRL

## (undated) DEVICE — APPL CHLORAPREP W/TINT 26ML BLU

## (undated) DEVICE — [HIGH FLOW INSUFFLATOR,  DO NOT USE IF PACKAGE IS DAMAGED,  KEEP DRY,  KEEP AWAY FROM SUNLIGHT,  PROTECT FROM HEAT AND RADIOACTIVE SOURCES.]: Brand: PNEUMOSURE

## (undated) DEVICE — GLV SURG SENSICARE MICRO PF LF 8.5 STRL

## (undated) DEVICE — PK BARIATRIC 10

## (undated) DEVICE — APL DUPLOSPRAYER MIS 40CM

## (undated) DEVICE — POWER SHELL: Brand: SIGNIA

## (undated) DEVICE — DRN PENRS 1/2X18IN LTX

## (undated) DEVICE — ENDOPATH XCEL BLADELESS TROCARS WITH STABILITY SLEEVES: Brand: ENDOPATH XCEL

## (undated) DEVICE — TISSUE RETRIEVAL SYSTEM: Brand: INZII RETRIEVAL SYSTEM

## (undated) DEVICE — COVER,LIGHT HANDLE,FLX,1/PK: Brand: MEDLINE INDUSTRIES, INC.

## (undated) DEVICE — ENDOPATH XCEL UNIVERSAL TROCAR STABLILITY SLEEVES: Brand: ENDOPATH XCEL

## (undated) DEVICE — GLV SURG SENSICARE MICRO PF LF 9 STRL